# Patient Record
Sex: FEMALE | Race: WHITE | NOT HISPANIC OR LATINO | Employment: STUDENT | URBAN - METROPOLITAN AREA
[De-identification: names, ages, dates, MRNs, and addresses within clinical notes are randomized per-mention and may not be internally consistent; named-entity substitution may affect disease eponyms.]

---

## 2023-02-24 LAB
ALANINE AMINOTRANSFERASE (SGPT) (U/L) IN SER/PLAS: 15 U/L (ref 7–45)
ALBUMIN (G/DL) IN SER/PLAS: 4.5 G/DL (ref 3.4–5)
ALKALINE PHOSPHATASE (U/L) IN SER/PLAS: 58 U/L (ref 33–110)
ANION GAP IN SER/PLAS: 13 MMOL/L (ref 10–20)
ASPARTATE AMINOTRANSFERASE (SGOT) (U/L) IN SER/PLAS: 17 U/L (ref 9–39)
BASOPHILS (10*3/UL) IN BLOOD BY AUTOMATED COUNT: 0.03 X10E9/L (ref 0–0.1)
BASOPHILS/100 LEUKOCYTES IN BLOOD BY AUTOMATED COUNT: 0.6 % (ref 0–2)
BILIRUBIN TOTAL (MG/DL) IN SER/PLAS: 0.6 MG/DL (ref 0–1.2)
CALCIUM (MG/DL) IN SER/PLAS: 9.4 MG/DL (ref 8.6–10.3)
CARBON DIOXIDE, TOTAL (MMOL/L) IN SER/PLAS: 26 MMOL/L (ref 21–32)
CHLORIDE (MMOL/L) IN SER/PLAS: 102 MMOL/L (ref 98–107)
CHOLESTEROL (MG/DL) IN SER/PLAS: 243 MG/DL (ref 0–199)
CHOLESTEROL IN HDL (MG/DL) IN SER/PLAS: 46.4 MG/DL
CHOLESTEROL/HDL RATIO: 5.2
CREATININE (MG/DL) IN SER/PLAS: 0.89 MG/DL (ref 0.5–1.05)
EOSINOPHILS (10*3/UL) IN BLOOD BY AUTOMATED COUNT: 0.04 X10E9/L (ref 0–0.7)
EOSINOPHILS/100 LEUKOCYTES IN BLOOD BY AUTOMATED COUNT: 0.7 % (ref 0–6)
ERYTHROCYTE DISTRIBUTION WIDTH (RATIO) BY AUTOMATED COUNT: 13 % (ref 11.5–14.5)
ERYTHROCYTE MEAN CORPUSCULAR HEMOGLOBIN CONCENTRATION (G/DL) BY AUTOMATED: 33.2 G/DL (ref 32–36)
ERYTHROCYTE MEAN CORPUSCULAR VOLUME (FL) BY AUTOMATED COUNT: 92 FL (ref 80–100)
ERYTHROCYTES (10*6/UL) IN BLOOD BY AUTOMATED COUNT: 4.15 X10E12/L (ref 4–5.2)
GFR FEMALE: >90 ML/MIN/1.73M2
GLUCOSE (MG/DL) IN SER/PLAS: 83 MG/DL (ref 74–99)
HEMATOCRIT (%) IN BLOOD BY AUTOMATED COUNT: 38 % (ref 36–46)
HEMOGLOBIN (G/DL) IN BLOOD: 12.6 G/DL (ref 12–16)
IMMATURE GRANULOCYTES/100 LEUKOCYTES IN BLOOD BY AUTOMATED COUNT: 0.2 % (ref 0–0.9)
LDL: 187 MG/DL (ref 0–109)
LEUKOCYTES (10*3/UL) IN BLOOD BY AUTOMATED COUNT: 5.4 X10E9/L (ref 4.4–11.3)
LYMPHOCYTES (10*3/UL) IN BLOOD BY AUTOMATED COUNT: 1.87 X10E9/L (ref 1.2–4.8)
LYMPHOCYTES/100 LEUKOCYTES IN BLOOD BY AUTOMATED COUNT: 34.9 % (ref 13–44)
MONOCYTES (10*3/UL) IN BLOOD BY AUTOMATED COUNT: 0.39 X10E9/L (ref 0.1–1)
MONOCYTES/100 LEUKOCYTES IN BLOOD BY AUTOMATED COUNT: 7.3 % (ref 2–10)
NEUTROPHILS (10*3/UL) IN BLOOD BY AUTOMATED COUNT: 3.02 X10E9/L (ref 1.2–7.7)
NEUTROPHILS/100 LEUKOCYTES IN BLOOD BY AUTOMATED COUNT: 56.3 % (ref 40–80)
NON HDL CHOLESTEROL: 197 MG/DL (ref 0–119)
PLATELETS (10*3/UL) IN BLOOD AUTOMATED COUNT: 212 X10E9/L (ref 150–450)
POTASSIUM (MMOL/L) IN SER/PLAS: 4.2 MMOL/L (ref 3.5–5.3)
PROTEIN TOTAL: 7 G/DL (ref 6.4–8.2)
SODIUM (MMOL/L) IN SER/PLAS: 137 MMOL/L (ref 136–145)
THYROTROPIN (MIU/L) IN SER/PLAS BY DETECTION LIMIT <= 0.05 MIU/L: 0.97 MIU/L (ref 0.44–3.98)
TRIGLYCERIDE (MG/DL) IN SER/PLAS: 46 MG/DL (ref 0–149)
UREA NITROGEN (MG/DL) IN SER/PLAS: 20 MG/DL (ref 6–23)
VLDL: 9 MG/DL (ref 0–40)

## 2023-03-03 LAB
CLUE CELLS: ABNORMAL
NUGENT SCORE: 4
VAGINITIS-BV + YEAST INTERPRETATION: ABNORMAL
YEAST: ABNORMAL

## 2023-03-09 LAB
BACTERIA, URINE: ABNORMAL /HPF
MUCUS, URINE: ABNORMAL /LPF
RBC, URINE: <1 /HPF (ref 0–5)
SQUAMOUS EPITHELIAL CELLS, URINE: 4 /HPF
WBC, URINE: 3 /HPF (ref 0–5)

## 2023-03-10 LAB
CLUE CELLS: ABNORMAL
NUGENT SCORE: 4
VAGINITIS-BV + YEAST INTERPRETATION: ABNORMAL
YEAST: ABNORMAL

## 2023-03-13 LAB
FUNGAL SCREEN, YEAST: ABNORMAL
URINE CULTURE: ABNORMAL

## 2023-05-15 LAB — URINE CULTURE: ABNORMAL

## 2023-05-26 LAB
GENETICS TEST NAME-DATA CONVERSION: NORMAL
LAB MOLECULAR CA TECHNICAL NOTES: NORMAL

## 2023-05-31 LAB
ESTIMATED AVERAGE GLUCOSE FOR HBA1C: 105 MG/DL
HEMOGLOBIN A1C/HEMOGLOBIN TOTAL IN BLOOD: 5.3 %

## 2023-06-01 LAB
CLUE CELLS: ABNORMAL
NUGENT SCORE: 4
VAGINITIS-BV + YEAST INTERPRETATION: ABNORMAL
YEAST: ABNORMAL

## 2023-09-15 ENCOUNTER — HOSPITAL ENCOUNTER (OUTPATIENT)
Dept: DATA CONVERSION | Facility: HOSPITAL | Age: 23
End: 2023-09-15
Attending: OBSTETRICS & GYNECOLOGY | Admitting: OBSTETRICS & GYNECOLOGY
Payer: MEDICAID

## 2023-09-15 DIAGNOSIS — N94.2 VAGINISMUS: ICD-10-CM

## 2023-09-15 DIAGNOSIS — R10.2 PELVIC AND PERINEAL PAIN: ICD-10-CM

## 2023-09-15 DIAGNOSIS — N30.10 INTERSTITIAL CYSTITIS (CHRONIC) WITHOUT HEMATURIA: ICD-10-CM

## 2023-09-19 PROBLEM — N94.2 VAGINISMUS: Status: ACTIVE | Noted: 2023-09-19

## 2023-09-19 PROBLEM — D72.819 LEUKOPENIA: Status: ACTIVE | Noted: 2023-09-19

## 2023-09-19 PROBLEM — R47.89 SPEECH DYSFLUENCY: Status: ACTIVE | Noted: 2023-09-19

## 2023-09-19 PROBLEM — N39.8 VOIDING DYSFUNCTION: Status: ACTIVE | Noted: 2023-09-19

## 2023-09-19 PROBLEM — F81.0 READING DIFFICULTY: Status: ACTIVE | Noted: 2023-09-19

## 2023-09-19 PROBLEM — F54 PSYCHOLOGICAL FACTOR AFFECTING PHYSICAL CONDITION: Status: ACTIVE | Noted: 2023-09-19

## 2023-09-19 PROBLEM — N30.10 INTERSTITIAL CYSTITIS: Status: ACTIVE | Noted: 2023-09-19

## 2023-09-19 PROBLEM — N32.89 BLADDER SPASM: Status: ACTIVE | Noted: 2023-09-19

## 2023-09-19 PROBLEM — M75.50 SCAPULOTHORACIC BURSITIS: Status: ACTIVE | Noted: 2023-09-19

## 2023-09-19 PROBLEM — K21.9 CHRONIC GERD: Status: ACTIVE | Noted: 2023-09-19

## 2023-09-19 PROBLEM — N92.6 IRREGULAR MENSES: Status: ACTIVE | Noted: 2023-09-19

## 2023-09-19 PROBLEM — R31.9 HEMATURIA: Status: ACTIVE | Noted: 2023-09-19

## 2023-09-19 PROBLEM — E78.01 HETEROZYGOUS FAMILIAL HYPERCHOLESTEROLEMIA: Status: ACTIVE | Noted: 2023-09-19

## 2023-09-19 PROBLEM — N94.6 DYSMENORRHEA: Status: ACTIVE | Noted: 2023-09-19

## 2023-09-19 PROBLEM — E78.2 MIXED HYPERLIPIDEMIA: Status: ACTIVE | Noted: 2023-09-19

## 2023-09-19 PROBLEM — S43.431S LABRAL TEAR OF SHOULDER, RIGHT, SEQUELA: Status: ACTIVE | Noted: 2023-09-19

## 2023-09-19 PROBLEM — F43.10 PTSD (POST-TRAUMATIC STRESS DISORDER): Status: ACTIVE | Noted: 2023-09-19

## 2023-09-19 PROBLEM — E55.9 VITAMIN D DEFICIENCY: Status: ACTIVE | Noted: 2023-09-19

## 2023-09-19 PROBLEM — F90.0 ADHD, PREDOMINANTLY INATTENTIVE TYPE: Status: ACTIVE | Noted: 2023-09-19

## 2023-09-19 PROBLEM — R04.0 RECURRENT EPISTAXIS: Status: ACTIVE | Noted: 2023-09-19

## 2023-09-19 PROBLEM — M94.0 COSTOCHONDRITIS: Status: ACTIVE | Noted: 2023-09-19

## 2023-09-19 PROBLEM — M62.89 HIGH-TONE PELVIC FLOOR DYSFUNCTION IN FEMALE: Status: ACTIVE | Noted: 2023-09-19
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
## 2023-09-19 PROBLEM — M75.20 BICEPS TENDINITIS OF SHOULDER: Status: ACTIVE | Noted: 2023-09-19

## 2023-09-19 PROBLEM — M26.629 TMJ SYNDROME: Status: ACTIVE | Noted: 2023-09-19

## 2023-09-19 PROBLEM — N92.0 MENORRHAGIA WITH REGULAR CYCLE: Status: ACTIVE | Noted: 2023-09-19

## 2023-09-19 PROBLEM — R07.89 ATYPICAL CHEST PAIN: Status: ACTIVE | Noted: 2023-09-19

## 2023-09-19 PROBLEM — R35.0 FREQUENCY OF URINATION: Status: ACTIVE | Noted: 2023-09-19

## 2023-09-19 PROBLEM — F41.9 ANXIETY: Status: ACTIVE | Noted: 2023-09-19

## 2023-09-19 PROBLEM — R32 URINARY INCONTINENCE: Status: ACTIVE | Noted: 2023-09-19

## 2023-09-19 PROBLEM — R03.0 ELEVATED BP WITHOUT DIAGNOSIS OF HYPERTENSION: Status: ACTIVE | Noted: 2023-09-19

## 2023-09-19 RX ORDER — IBUPROFEN 600 MG/1
1 TABLET ORAL EVERY 8 HOURS PRN
COMMUNITY
Start: 2022-06-03

## 2023-09-19 RX ORDER — HYDROXYZINE HYDROCHLORIDE 25 MG/1
25 TABLET, FILM COATED ORAL NIGHTLY
COMMUNITY
End: 2023-11-27 | Stop reason: SDUPTHER

## 2023-09-19 RX ORDER — IBUPROFEN 800 MG/1
800 TABLET ORAL EVERY 6 HOURS PRN
COMMUNITY
Start: 2023-03-31 | End: 2023-11-30 | Stop reason: WASHOUT

## 2023-09-19 RX ORDER — NITROFURANTOIN 25; 75 MG/1; MG/1
1 CAPSULE ORAL EVERY 12 HOURS
COMMUNITY
Start: 2022-09-09 | End: 2023-11-30 | Stop reason: ALTCHOICE

## 2023-09-19 RX ORDER — NORETHINDRONE ACETATE AND ETHINYL ESTRADIOL, AND FERROUS FUMARATE 1.5-30(21)
1 KIT ORAL DAILY
COMMUNITY
Start: 2022-02-23 | End: 2023-11-30 | Stop reason: ALTCHOICE

## 2023-09-19 RX ORDER — NORETHINDRONE 0.35 MG/1
1 TABLET ORAL DAILY
COMMUNITY
End: 2023-11-30 | Stop reason: SINTOL

## 2023-09-19 RX ORDER — OMEPRAZOLE 20 MG/1
20 CAPSULE, DELAYED RELEASE ORAL DAILY
COMMUNITY
End: 2023-11-30 | Stop reason: ALTCHOICE

## 2023-09-19 RX ORDER — SOLIFENACIN SUCCINATE 5 MG/1
5 TABLET, FILM COATED ORAL DAILY
COMMUNITY
End: 2023-11-30

## 2023-09-19 RX ORDER — METHENAMINE, SODIUM PHOSPHATE, MONOBASIC, MONOHYDRATE, PHENYL SALICYLATE, METHYLENE BLUE, AND HYOSCYAMINE SULFATE 118; 40.8; 36; 10; .12 MG/1; MG/1; MG/1; MG/1; MG/1
1 CAPSULE ORAL 3 TIMES DAILY
COMMUNITY
Start: 2022-12-01 | End: 2024-01-05 | Stop reason: SDUPTHER

## 2023-09-19 RX ORDER — NORETHINDRONE ACETATE AND ETHINYL ESTRADIOL .03; 1.5 MG/1; MG/1
1 TABLET ORAL DAILY
COMMUNITY
End: 2023-11-30 | Stop reason: ALTCHOICE

## 2023-09-19 RX ORDER — AMITRIPTYLINE HYDROCHLORIDE 25 MG/1
25 TABLET, FILM COATED ORAL NIGHTLY
COMMUNITY
End: 2024-01-05 | Stop reason: SDUPTHER

## 2023-09-19 RX ORDER — CYCLOBENZAPRINE HCL 5 MG
1-2 TABLET ORAL
COMMUNITY
Start: 2022-06-03 | End: 2024-01-02 | Stop reason: SDUPTHER

## 2023-09-19 RX ORDER — PHENAZOPYRIDINE HYDROCHLORIDE 200 MG/1
200 TABLET, FILM COATED ORAL 3 TIMES DAILY
COMMUNITY
Start: 2023-03-15 | End: 2023-11-30 | Stop reason: ALTCHOICE

## 2023-09-19 RX ORDER — DIAZEPAM 5 MG/1
5 TABLET ORAL NIGHTLY PRN
COMMUNITY

## 2023-09-19 RX ORDER — ACETAMINOPHEN AND CODEINE PHOSPHATE 300; 30 MG/1; MG/1
1 TABLET ORAL 4 TIMES DAILY PRN
COMMUNITY
Start: 2023-03-31 | End: 2023-11-30 | Stop reason: ALTCHOICE

## 2023-09-19 RX ORDER — TAMSULOSIN HYDROCHLORIDE 0.4 MG/1
0.4 CAPSULE ORAL NIGHTLY
COMMUNITY
End: 2023-12-22

## 2023-09-19 RX ORDER — OMEPRAZOLE 40 MG/1
40 CAPSULE, DELAYED RELEASE ORAL
COMMUNITY
Start: 2020-09-01 | End: 2024-01-02 | Stop reason: SDUPTHER

## 2023-09-19 RX ORDER — CHLORHEXIDINE GLUCONATE ORAL RINSE 1.2 MG/ML
15 SOLUTION DENTAL DAILY
COMMUNITY
Start: 2023-03-31 | End: 2023-11-30 | Stop reason: ALTCHOICE

## 2023-09-19 RX ORDER — LORATADINE 10 MG/1
1 TABLET ORAL DAILY PRN
COMMUNITY
Start: 2021-08-09

## 2023-09-19 RX ORDER — ATORVASTATIN CALCIUM 80 MG/1
1 TABLET, FILM COATED ORAL DAILY
COMMUNITY
Start: 2023-06-16 | End: 2023-11-30 | Stop reason: DRUGHIGH

## 2023-09-19 RX ORDER — AMITRIPTYLINE HYDROCHLORIDE 10 MG/1
10-30 TABLET, FILM COATED ORAL NIGHTLY
COMMUNITY
End: 2023-11-30 | Stop reason: ALTCHOICE

## 2023-09-19 RX ORDER — VIBEGRON 75 MG/1
1 TABLET, FILM COATED ORAL DAILY
COMMUNITY
Start: 2023-02-02 | End: 2024-04-30 | Stop reason: SDUPTHER

## 2023-09-19 RX ORDER — IBREXAFUNGERP 150 MG/1
2 TABLET, FILM COATED ORAL EVERY 12 HOURS
COMMUNITY
Start: 2023-04-12 | End: 2023-11-30 | Stop reason: ALTCHOICE

## 2023-09-23 LAB
CHOLESTEROL (MG/DL) IN SER/PLAS: 116 MG/DL (ref 0–199)
CHOLESTEROL IN HDL (MG/DL) IN SER/PLAS: 51.4 MG/DL
CHOLESTEROL/HDL RATIO: 2.3
LDL: 56 MG/DL (ref 0–119)
NON HDL CHOLESTEROL: 65 MG/DL (ref 0–149)
TRIGLYCERIDE (MG/DL) IN SER/PLAS: 42 MG/DL (ref 0–149)
VLDL: 8 MG/DL (ref 0–40)

## 2023-09-24 LAB — URINE CULTURE: NORMAL

## 2023-09-29 VITALS — BODY MASS INDEX: 21.63 KG/M2 | HEIGHT: 67 IN | WEIGHT: 137.79 LBS | RESPIRATION RATE: 16 BRPM

## 2023-09-30 NOTE — H&P
"    History of Present Illness:   Pregnant/Lactating:  ·  Are You Pregnant no (1)   ·  Are You Currently Breastfeeding no (1)     History Present Illness:  Reason for surgery: Pelvic pain   HPI:    24 yo with chronic pelvic pain and interstitial cystitis. Pain has responded well to injections in the past and Hunner lesions have been visualized in the past. Therefore  plan to repeat injections and look for Hunner lesions to fulgarate.    Allergies:        Allergies:  ·  No Known Allergies :     Home Medication Review:   Home Medications Reviewed: yes     Impression/Procedure:   ·  Impression and Planned Procedure: Cystoscopy and trigger point injections       ERAS (Enhanced Recovery After Surgery):  ·  ERAS Patient: no       Vital Signs:  Respiratory Rate: 16 breath per minute     Physical Exam by System:    Constitutional: Well developed, awake/alert/oriented  x3, no distress, alert and cooperative   Eyes: PERRL, EOMI, clear sclera   Head/Neck: Neck supple, no apparent injury, thyroid  without mass or tenderness, No JVD, trachea midline, no bruits   Respiratory/Thorax: Patent airways, CTAB, normal  breath sounds with good chest expansion, thorax symmetric   Cardiovascular: Regular, rate and rhythm, no murmurs,  2+ equal pulses of the extremities, normal S 1and S 2   Genitourinary: defer to the OR   Psychological: Appropriate mood and behavior   Skin: Warm and dry, no lesions, no rashes     Consent:   COVID-19 Consent:  ·  COVID-19 Risk Consent Surgeon has reviewed key risks related to the risk of nehemiah COVID-19 and if they contract COVID-19 what the risks are.       Electronic Signatures:  Karen Hernandez)  (Signed 15-Sep-2023 07:22)   Authored: History of Present Illness, Allergies, Home  Medication Review, Impression/Procedure, ERAS, Physical Exam, Consent, Note Completion      Last Updated: 15-Sep-2023 07:22 by Karen Hernandez)    References:  1.  Data Referenced From \"Patient Profile - Preop " "v3\" 15-Sep-2023 07:09   "

## 2023-10-01 NOTE — OP NOTE
Post Operative Note:     PreOp Diagnosis: Pelvic pain, interstitial cystitis   Post-Procedure Diagnosis: Same   Procedure: 1. Pelvic floor trigger point injections  2.  Pudendal nerve block  3. Cystoscopy  4. Bladder fulgaration   Surgeon:    Resident/Fellow/Other Assistant: none   Anesthesia: MAC   Estimated Blood Loss (mL): 5   Specimen: no   Complications: none   Findings: bladder with multiple hunner's lesions  and petechiae     Operative Report Dictated:  Dictation: not applicable - note contains Operative  Report   Operative Report:    PROCEDURE IN DETAIL:  The patient was interviewed in the preop holding area by the surgical team, where the risks, benefits, and indication of the procedure were reviewed.  She was then transferred to the operating suite where the patient was properly identified and the procedure  was confirmed. When adequate anesthesia was obtained, the patient was prepped and draped in a dorsal lithotomy position. A time-out was performed. No antibiotics were given.    20 mL of 1% plain lidocaine and 1 mL of 40mg/cc kenalog was drawn up and 5 mL was injected into each pudendal nerve.  Using a pudendal injection tray, the remaining solution solution was then injected into the medial and lateral aspects of the bilateral levator ani, iliococcygeus, coccygeus, piriformis, and obturator internus muscles. Approximately 1-2 mL  was placed  into each injection site. Pressure was applied to the bilateral muscle beds with adequate hemostasis achieved.     Attention was then turned to the bladder where cystoscopy was done. As the bladder was filling, many areas of petechiae became more prominent. two or three hunner's lesions were noted and the bladder began to slowly bleed. HR increased as well.  Hydrodistention was maintained for five minutes. Bladder was emptied and found to be 700 ml.  The hunner's lesions were then fulgarated and some of the bleeding area also fulgarated. Bladder was  irrigated and emptied. Urojet was inserted in the urethra and vagina for extra comfort.    The patient was then awakened from anesthesia, having tolerated procedure well, and was taken to the recovery room in stable condition. All sponge, needle, and instrument counts were correct at the end the case.      Attestation:   Note Completion:  Attending Attestation I was present for the entire procedure         Electronic Signatures:  Karen Hernandez)  (Signed 15-Sep-2023 10:04)   Authored: Post Operative Note, Note Completion      Last Updated: 15-Sep-2023 10:04 by Karen Hernandez)

## 2023-10-19 ENCOUNTER — OFFICE VISIT (OUTPATIENT)
Dept: UROLOGY | Facility: CLINIC | Age: 23
End: 2023-10-19
Payer: MEDICAID

## 2023-10-19 ENCOUNTER — PATIENT MESSAGE (OUTPATIENT)
Dept: OBSTETRICS AND GYNECOLOGY | Facility: CLINIC | Age: 23
End: 2023-10-19

## 2023-10-19 VITALS
HEART RATE: 96 BPM | TEMPERATURE: 98.4 F | DIASTOLIC BLOOD PRESSURE: 84 MMHG | HEIGHT: 67 IN | WEIGHT: 134 LBS | BODY MASS INDEX: 21.03 KG/M2 | SYSTOLIC BLOOD PRESSURE: 136 MMHG

## 2023-10-19 DIAGNOSIS — N30.10 INTERSTITIAL CYSTITIS: ICD-10-CM

## 2023-10-19 DIAGNOSIS — Z30.41 ENCOUNTER FOR SURVEILLANCE OF CONTRACEPTIVE PILLS: Primary | ICD-10-CM

## 2023-10-19 DIAGNOSIS — R10.2 PELVIC PAIN: Primary | ICD-10-CM

## 2023-10-19 PROCEDURE — 1036F TOBACCO NON-USER: CPT | Performed by: OBSTETRICS & GYNECOLOGY

## 2023-10-19 PROCEDURE — 99024 POSTOP FOLLOW-UP VISIT: CPT | Performed by: OBSTETRICS & GYNECOLOGY

## 2023-10-19 ASSESSMENT — PAIN SCALES - GENERAL: PAINLEVEL: 6

## 2023-10-19 NOTE — PROGRESS NOTES
"Subjective   Patient ID: Tracey Villa is a 23 y.o. female who presents for Follow-up.  Patient reports experiencing flare up of pelvic pain. She notes that she has not been able to attend physical therapy since Ela left. She states that school is going well, and has no new complaints.      Only that trigger points did not help as much as last time.     Review of Systems   All other systems reviewed and are negative.      Objective   Physical Exam  Vitals reviewed.     /84   Pulse 96   Temp 36.9 °C (98.4 °F)   Ht 1.702 m (5' 7\")   Wt 60.8 kg (134 lb)   BMI 20.99 kg/m²       Assessment/Plan     Pelvic pain: Decrease amytriptylline dose. Make an appointment with Ela and follow-up with Yelitza. Follow-up as needed.         Scribe Attestation  By signing my name below, IMedina, Scribe   attest that this documentation has been prepared under the direction and in the presence of Karen Hernandez MD MPH.    "

## 2023-10-19 NOTE — LETTER
October 19, 2023     Patient: Tracey Villa   YOB: 2000   Date of Visit: 10/19/2023       To Whom It May Concern:    Tracey Villa was seen in my clinic on 10/19/2023 at 3:00 pm. Please excuse Tracey for her absence from work on this day to make the appointment.    If you have any questions or concerns, please don't hesitate to call.         Sincerely,         Karen Hernandez MD MPH        CC: No Recipients

## 2023-10-20 RX ORDER — DROSPIRENONE 4 MG/1
4 TABLET, FILM COATED ORAL DAILY
Qty: 90 TABLET | Refills: 2 | Status: SHIPPED | OUTPATIENT
Start: 2023-10-20 | End: 2024-01-16 | Stop reason: SDUPTHER

## 2023-11-27 DIAGNOSIS — N30.10 INTERSTITIAL CYSTITIS: ICD-10-CM

## 2023-11-27 DIAGNOSIS — N30.10 INTERSTITIAL CYSTITIS: Primary | ICD-10-CM

## 2023-11-28 RX ORDER — HYDROXYZINE HYDROCHLORIDE 25 MG/1
25 TABLET, FILM COATED ORAL NIGHTLY
Qty: 30 TABLET | Refills: 0 | Status: SHIPPED | OUTPATIENT
Start: 2023-11-28 | End: 2024-01-16 | Stop reason: SDUPTHER

## 2023-11-28 RX ORDER — HYDROXYZINE HYDROCHLORIDE 25 MG/1
25 TABLET, FILM COATED ORAL NIGHTLY
Qty: 60 TABLET | Refills: 4 | Status: SHIPPED | OUTPATIENT
Start: 2023-11-28 | End: 2023-11-30 | Stop reason: ALTCHOICE

## 2023-11-30 ENCOUNTER — OFFICE VISIT (OUTPATIENT)
Dept: PRIMARY CARE | Facility: CLINIC | Age: 23
End: 2023-11-30
Payer: MEDICAID

## 2023-11-30 VITALS
DIASTOLIC BLOOD PRESSURE: 72 MMHG | BODY MASS INDEX: 20.99 KG/M2 | WEIGHT: 134 LBS | HEART RATE: 121 BPM | SYSTOLIC BLOOD PRESSURE: 110 MMHG

## 2023-11-30 DIAGNOSIS — Z91.018 FOOD ALLERGY: Primary | ICD-10-CM

## 2023-11-30 PROBLEM — D72.819 LEUKOPENIA: Status: RESOLVED | Noted: 2023-09-19 | Resolved: 2023-11-30

## 2023-11-30 PROBLEM — R04.0 RECURRENT EPISTAXIS: Status: RESOLVED | Noted: 2023-09-19 | Resolved: 2023-11-30

## 2023-11-30 PROBLEM — R03.0 ELEVATED BP WITHOUT DIAGNOSIS OF HYPERTENSION: Status: RESOLVED | Noted: 2023-09-19 | Resolved: 2023-11-30

## 2023-11-30 PROBLEM — R31.9 HEMATURIA: Status: RESOLVED | Noted: 2023-09-19 | Resolved: 2023-11-30
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
## 2023-11-30 PROCEDURE — 1036F TOBACCO NON-USER: CPT | Performed by: FAMILY MEDICINE

## 2023-11-30 PROCEDURE — 99213 OFFICE O/P EST LOW 20 MIN: CPT | Performed by: FAMILY MEDICINE

## 2023-11-30 RX ORDER — EPINEPHRINE 0.3 MG/.3ML
1 INJECTION SUBCUTANEOUS AS NEEDED
Qty: 2 EACH | Refills: 2 | Status: SHIPPED | OUTPATIENT
Start: 2023-11-30 | End: 2024-01-10

## 2023-11-30 RX ORDER — ATORVASTATIN CALCIUM 80 MG/1
40 TABLET, FILM COATED ORAL DAILY
Qty: 45 TABLET | Refills: 1 | Status: SHIPPED
Start: 2023-11-30

## 2023-11-30 NOTE — PATIENT INSTRUCTIONS
Since tomatoes, milk and chocolate are causing you issues -   Avoid ingesting them.       I have referred you to an allergist to see if they can help you.     I have prescribed Epi-pens to use if you ever feel like you have eaten something and it starts to effect your throat or your breathing.     If you need to use it call 911.     It only gives you 20 minutes of relief before symptoms come back again.    If you  need to - you can use the second dose of epi before 911 gets there and any trouble with your throat or breathing persists.

## 2023-11-30 NOTE — PROGRESS NOTES
Subjective   Patient ID: Tracey Villa is a 23 y.o. female who presents for here for a possible food allergy. (When she eats tomatoes her face around the mouth swells, itchy and vomiting.).    HPI       Has discovered she is allergic to tomatoes -   Any form of them cause her lips to swell and get blisters around her mouth - within 30 min  -   Chest hurts, GERD gets worse too -   If she avoids them - she does not have these sx -     Its very hard to stay away from tomatoes  - interested in treatment for this       Milk and chocolate causes sores in her mouth too           Review of Systems    Objective   /72 (BP Location: Right arm, Patient Position: Sitting, BP Cuff Size: Adult)   Pulse (!) 121   Wt 60.8 kg (134 lb)   BMI 20.99 kg/m²     Physical Exam  Vitals reviewed.   Constitutional:       General: She is not in acute distress.     Appearance: Normal appearance.   HENT:      Head: Normocephalic and atraumatic.      Nose: Nose normal.      Mouth/Throat:      Mouth: Mucous membranes are moist.      Pharynx: No posterior oropharyngeal erythema.   Eyes:      Extraocular Movements: Extraocular movements intact.      Conjunctiva/sclera: Conjunctivae normal.      Pupils: Pupils are equal, round, and reactive to light.   Cardiovascular:      Rate and Rhythm: Normal rate and regular rhythm.      Heart sounds: Normal heart sounds. No murmur heard.  Pulmonary:      Effort: Pulmonary effort is normal. No respiratory distress.      Breath sounds: Normal breath sounds. No wheezing.   Musculoskeletal:      Cervical back: No rigidity.   Lymphadenopathy:      Cervical: No cervical adenopathy.   Skin:     General: Skin is warm and dry.      Findings: No rash.   Neurological:      General: No focal deficit present.      Mental Status: She is alert. Mental status is at baseline.   Psychiatric:         Mood and Affect: Mood normal.         Thought Content: Thought content normal.         Assessment/Plan   Problem  List Items Addressed This Visit    None  Visit Diagnoses         Codes    Food allergy    -  Primary Z91.018    Relevant Medications    EPINEPHrine (Epipen) 0.3 mg/0.3 mL injection syringe    Other Relevant Orders    Referral to Allergy          She is interested in if there is immunotherapy -   I told her I was not sure - refer to allergist     We discussed at visit any disease processes that were of concern as well as the risks, benefits and instructions of any new medication provided.    See orders and discussion section for information handed to patient on their Clinical Summary.   Patient (and/or caretaker of patient if present)  stated all questions were answered, and they voiced understanding of instructions.

## 2023-12-22 DIAGNOSIS — N32.89 BLADDER SPASM: ICD-10-CM

## 2023-12-22 DIAGNOSIS — N30.10 INTERSTITIAL CYSTITIS: ICD-10-CM

## 2023-12-22 RX ORDER — TAMSULOSIN HYDROCHLORIDE 0.4 MG/1
0.4 CAPSULE ORAL
Qty: 90 CAPSULE | Refills: 0 | Status: SHIPPED | OUTPATIENT
Start: 2023-12-22 | End: 2024-03-19

## 2024-01-02 ENCOUNTER — TELEPHONE (OUTPATIENT)
Dept: PRIMARY CARE | Facility: CLINIC | Age: 24
End: 2024-01-02
Payer: MEDICAID

## 2024-01-02 DIAGNOSIS — K21.9 CHRONIC GERD: ICD-10-CM

## 2024-01-02 DIAGNOSIS — M94.0 COSTOCHONDRITIS: Primary | ICD-10-CM

## 2024-01-02 DIAGNOSIS — M94.0 COSTOCHONDRITIS: ICD-10-CM

## 2024-01-02 RX ORDER — CYCLOBENZAPRINE HCL 5 MG
5-10 TABLET ORAL EVERY 8 HOURS PRN
Qty: 90 TABLET | Refills: 2 | Status: SHIPPED | OUTPATIENT
Start: 2024-01-02

## 2024-01-02 RX ORDER — CYCLOBENZAPRINE HCL 5 MG
5-10 TABLET ORAL 3 TIMES DAILY PRN
Qty: 90 TABLET | Refills: 2 | Status: SHIPPED | OUTPATIENT
Start: 2024-01-02 | End: 2024-01-02 | Stop reason: SDUPTHER

## 2024-01-02 RX ORDER — OMEPRAZOLE 40 MG/1
40 CAPSULE, DELAYED RELEASE ORAL
Qty: 90 CAPSULE | Refills: 1 | Status: SHIPPED | OUTPATIENT
Start: 2024-01-02

## 2024-01-02 NOTE — TELEPHONE ENCOUNTER
Maranda from [facility] pharmacy called to get clarification of the cyclobenzaprine rx sent in today.

## 2024-01-05 DIAGNOSIS — N39.0 RECURRENT UTI: ICD-10-CM

## 2024-01-05 DIAGNOSIS — R10.2 PELVIC PAIN: ICD-10-CM

## 2024-01-05 DIAGNOSIS — R30.0 DYSURIA: Primary | ICD-10-CM

## 2024-01-05 RX ORDER — METHENAMINE, SODIUM PHOSPHATE, MONOBASIC, MONOHYDRATE, PHENYL SALICYLATE, METHYLENE BLUE, AND HYOSCYAMINE SULFATE 118; 40.8; 36; 10; .12 MG/1; MG/1; MG/1; MG/1; MG/1
1 CAPSULE ORAL 3 TIMES DAILY
Qty: 30 CAPSULE | Refills: 3 | Status: SHIPPED | OUTPATIENT
Start: 2024-01-05 | End: 2024-01-10 | Stop reason: SDUPTHER

## 2024-01-05 RX ORDER — AMITRIPTYLINE HYDROCHLORIDE 25 MG/1
25 TABLET, FILM COATED ORAL NIGHTLY
Qty: 90 TABLET | Refills: 3 | Status: SHIPPED | OUTPATIENT
Start: 2024-01-05 | End: 2025-01-04

## 2024-01-09 ENCOUNTER — TELEPHONE (OUTPATIENT)
Dept: UROLOGY | Facility: CLINIC | Age: 24
End: 2024-01-09
Payer: MEDICAID

## 2024-01-09 NOTE — TELEPHONE ENCOUNTER
Maranda, pharmacist at [facility] in [place] left message stating that the Uribel that was sent in for the patient is not currently available as it is on back order and is asking if there is something else you want to send in instead. Please advise, thank you.

## 2024-01-10 ENCOUNTER — OFFICE VISIT (OUTPATIENT)
Dept: OBSTETRICS AND GYNECOLOGY | Facility: CLINIC | Age: 24
End: 2024-01-10
Payer: MEDICAID

## 2024-01-10 VITALS — DIASTOLIC BLOOD PRESSURE: 80 MMHG | BODY MASS INDEX: 20.2 KG/M2 | SYSTOLIC BLOOD PRESSURE: 122 MMHG | WEIGHT: 129 LBS

## 2024-01-10 DIAGNOSIS — R39.9 UTI SYMPTOMS: ICD-10-CM

## 2024-01-10 DIAGNOSIS — Z30.09 BIRTH CONTROL COUNSELING: Primary | ICD-10-CM

## 2024-01-10 DIAGNOSIS — R30.0 DYSURIA: ICD-10-CM

## 2024-01-10 DIAGNOSIS — Z11.3 SCREENING FOR STDS (SEXUALLY TRANSMITTED DISEASES): ICD-10-CM

## 2024-01-10 DIAGNOSIS — Z32.02 PREGNANCY TEST NEGATIVE: ICD-10-CM

## 2024-01-10 LAB — PREGNANCY TEST URINE, POC: NEGATIVE

## 2024-01-10 PROCEDURE — 87086 URINE CULTURE/COLONY COUNT: CPT

## 2024-01-10 PROCEDURE — 81025 URINE PREGNANCY TEST: CPT | Performed by: NURSE PRACTITIONER

## 2024-01-10 PROCEDURE — 87800 DETECT AGNT MULT DNA DIREC: CPT

## 2024-01-10 PROCEDURE — 1036F TOBACCO NON-USER: CPT | Performed by: NURSE PRACTITIONER

## 2024-01-10 PROCEDURE — 99213 OFFICE O/P EST LOW 20 MIN: CPT | Performed by: NURSE PRACTITIONER

## 2024-01-10 RX ORDER — NITROFURANTOIN 25; 75 MG/1; MG/1
100 CAPSULE ORAL 2 TIMES DAILY
COMMUNITY
Start: 2024-01-06 | End: 2024-01-11

## 2024-01-10 RX ORDER — ONDANSETRON 4 MG/1
4 TABLET, ORALLY DISINTEGRATING ORAL EVERY 6 HOURS PRN
COMMUNITY
Start: 2023-12-11

## 2024-01-10 RX ORDER — METHENAMINE, SODIUM PHOSPHATE, MONOBASIC, MONOHYDRATE, PHENYL SALICYLATE, METHYLENE BLUE, AND HYOSCYAMINE SULFATE 118; 40.8; 36; 10; .12 MG/1; MG/1; MG/1; MG/1; MG/1
1 CAPSULE ORAL DAILY PRN
Qty: 30 CAPSULE | Refills: 1 | Status: SHIPPED | OUTPATIENT
Start: 2024-01-10 | End: 2024-02-09

## 2024-01-10 ASSESSMENT — ENCOUNTER SYMPTOMS: FATIGUE: 1
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
## 2024-01-10 NOTE — TELEPHONE ENCOUNTER
[facility] does not have the substitute either. Spoke with the patient and she is asking if we can send the uribel to the  in [place]. I did add it as a pharmacy in her chart, thanks.

## 2024-01-11 LAB
BACTERIA UR CULT: NO GROWTH
C TRACH RRNA SPEC QL NAA+PROBE: NEGATIVE
N GONORRHOEA DNA SPEC QL PROBE+SIG AMP: NEGATIVE

## 2024-01-16 ENCOUNTER — PATIENT MESSAGE (OUTPATIENT)
Dept: OBSTETRICS AND GYNECOLOGY | Facility: CLINIC | Age: 24
End: 2024-01-16
Payer: MEDICAID

## 2024-01-16 DIAGNOSIS — N30.10 INTERSTITIAL CYSTITIS: ICD-10-CM

## 2024-01-16 DIAGNOSIS — Z30.41 ENCOUNTER FOR SURVEILLANCE OF CONTRACEPTIVE PILLS: ICD-10-CM

## 2024-01-16 RX ORDER — DROSPIRENONE 4 MG/1
4 TABLET, FILM COATED ORAL DAILY
Qty: 90 TABLET | Refills: 1 | Status: SHIPPED | OUTPATIENT
Start: 2024-01-16 | End: 2024-05-20 | Stop reason: SDUPTHER

## 2024-01-16 RX ORDER — HYDROXYZINE HYDROCHLORIDE 25 MG/1
25 TABLET, FILM COATED ORAL NIGHTLY
Qty: 30 TABLET | Refills: 8 | Status: SHIPPED | OUTPATIENT
Start: 2024-01-16

## 2024-02-19 ENCOUNTER — APPOINTMENT (OUTPATIENT)
Dept: ALLERGY | Facility: CLINIC | Age: 24
End: 2024-02-19
Payer: MEDICAID

## 2024-03-17 DIAGNOSIS — N32.89 BLADDER SPASM: ICD-10-CM

## 2024-03-17 DIAGNOSIS — N30.10 INTERSTITIAL CYSTITIS: ICD-10-CM

## 2024-03-19 RX ORDER — TAMSULOSIN HYDROCHLORIDE 0.4 MG/1
0.4 CAPSULE ORAL
Qty: 90 CAPSULE | Refills: 1 | Status: SHIPPED | OUTPATIENT
Start: 2024-03-19 | End: 2024-04-30 | Stop reason: SDUPTHER

## 2024-04-05 ENCOUNTER — OFFICE VISIT (OUTPATIENT)
Dept: PRIMARY CARE | Facility: CLINIC | Age: 24
End: 2024-04-05
Payer: MEDICAID

## 2024-04-05 VITALS
OXYGEN SATURATION: 98 % | HEART RATE: 104 BPM | SYSTOLIC BLOOD PRESSURE: 116 MMHG | WEIGHT: 137.3 LBS | DIASTOLIC BLOOD PRESSURE: 75 MMHG | BODY MASS INDEX: 21.5 KG/M2

## 2024-04-05 DIAGNOSIS — R00.2 PALPITATIONS: ICD-10-CM

## 2024-04-05 DIAGNOSIS — R42 DIZZINESS: ICD-10-CM

## 2024-04-05 DIAGNOSIS — R23.2 HOT FLASHES: Primary | ICD-10-CM

## 2024-04-05 DIAGNOSIS — R00.0 TACHYCARDIA: ICD-10-CM

## 2024-04-05 LAB
ALBUMIN SERPL BCP-MCNC: 4.6 G/DL (ref 3.4–5)
ALP SERPL-CCNC: 81 U/L (ref 33–110)
ALT SERPL W P-5'-P-CCNC: 17 U/L (ref 7–45)
ANION GAP SERPL CALC-SCNC: 15 MMOL/L (ref 10–20)
AST SERPL W P-5'-P-CCNC: 17 U/L (ref 9–39)
BASOPHILS # BLD AUTO: 0.03 X10*3/UL (ref 0–0.1)
BASOPHILS NFR BLD AUTO: 0.8 %
BILIRUB SERPL-MCNC: 0.6 MG/DL (ref 0–1.2)
BUN SERPL-MCNC: 19 MG/DL (ref 6–23)
CALCIUM SERPL-MCNC: 9.6 MG/DL (ref 8.6–10.3)
CHLORIDE SERPL-SCNC: 105 MMOL/L (ref 98–107)
CO2 SERPL-SCNC: 26 MMOL/L (ref 21–32)
CREAT SERPL-MCNC: 0.89 MG/DL (ref 0.5–1.05)
EGFRCR SERPLBLD CKD-EPI 2021: >90 ML/MIN/1.73M*2
EOSINOPHIL # BLD AUTO: 0.05 X10*3/UL (ref 0–0.7)
EOSINOPHIL NFR BLD AUTO: 1.3 %
ERYTHROCYTE [DISTWIDTH] IN BLOOD BY AUTOMATED COUNT: 12.7 % (ref 11.5–14.5)
GLUCOSE SERPL-MCNC: 78 MG/DL (ref 74–99)
HCT VFR BLD AUTO: 41.3 % (ref 36–46)
HGB BLD-MCNC: 13.4 G/DL (ref 12–16)
IMM GRANULOCYTES # BLD AUTO: 0 X10*3/UL (ref 0–0.7)
IMM GRANULOCYTES NFR BLD AUTO: 0 % (ref 0–0.9)
LYMPHOCYTES # BLD AUTO: 1.58 X10*3/UL (ref 1.2–4.8)
LYMPHOCYTES NFR BLD AUTO: 42.5 %
MCH RBC QN AUTO: 30.4 PG (ref 26–34)
MCHC RBC AUTO-ENTMCNC: 32.4 G/DL (ref 32–36)
MCV RBC AUTO: 94 FL (ref 80–100)
MONOCYTES # BLD AUTO: 0.31 X10*3/UL (ref 0.1–1)
MONOCYTES NFR BLD AUTO: 8.3 %
NEUTROPHILS # BLD AUTO: 1.75 X10*3/UL (ref 1.2–7.7)
NEUTROPHILS NFR BLD AUTO: 47.1 %
NRBC BLD-RTO: 0 /100 WBCS (ref 0–0)
PLATELET # BLD AUTO: 187 X10*3/UL (ref 150–450)
POTASSIUM SERPL-SCNC: 4.3 MMOL/L (ref 3.5–5.3)
PROT SERPL-MCNC: 7.1 G/DL (ref 6.4–8.2)
RBC # BLD AUTO: 4.41 X10*6/UL (ref 4–5.2)
SODIUM SERPL-SCNC: 142 MMOL/L (ref 136–145)
TSH SERPL-ACNC: 2.08 MIU/L (ref 0.44–3.98)
WBC # BLD AUTO: 3.7 X10*3/UL (ref 4.4–11.3)

## 2024-04-05 PROCEDURE — 1036F TOBACCO NON-USER: CPT

## 2024-04-05 PROCEDURE — 84443 ASSAY THYROID STIM HORMONE: CPT

## 2024-04-05 PROCEDURE — 36415 COLL VENOUS BLD VENIPUNCTURE: CPT

## 2024-04-05 PROCEDURE — 99213 OFFICE O/P EST LOW 20 MIN: CPT

## 2024-04-05 PROCEDURE — 80053 COMPREHEN METABOLIC PANEL: CPT

## 2024-04-05 PROCEDURE — 85025 COMPLETE CBC W/AUTO DIFF WBC: CPT

## 2024-04-05 NOTE — PATIENT INSTRUCTIONS
-TSH to check thyroid   -CMP to look at electrolytes, kidney function, liver function  -CBC w/ diff - to look at red blood cells and white blood cells - look for anemia   -Zio patch/holter monitor to look for palpitations/irregular beats  -Tilt table for the dizziness and high heart rate - evaluating for POTS [phone]

## 2024-04-05 NOTE — PROGRESS NOTES
"Subjective   Patient ID: Tracey Villa is a 24 y.o. female who presents for Dizziness (Numbness in feet and heart rate elevated).  HPI  Tracey states that she has been getting hot flashes/sweaty for the past 3 weeks  It is getting worse, she will get the hot flashes just sitting down or even outside in the cold  She also gets dizzy, most recently she was getting up from a chair and got lightheaded/dizzy and then fell back into the chair   She states she will feel dizzy changing positions but also feels dizzy sitting down not moving  She has passed out before, it was a few years ago   She states she feels her heart beating  She feels out of breath, like she has been running, which goes on for the whole day  No smoking or vaping     Chest pain has been an on-going issue  She has concern about a heart-related issue   She describes the sensation as a \"glass in her chest\" sensation and she has a tight pressure feeling in chest   CHF in her family  HR has been high - last couple months has been at 120s, she does feel palpations   Peliv PT said to keep track of it - pulse is high, 126, getting higher    She also has numbness in her feet, starts at feet and goes up into legs and sometimes into thighs  Happening every day even siting or walking  She says it feels like her feet fall asleep  L side was hurting when it got numb and tingling   Lasts the majority of the day will go away on its own     Past Surgical History:   Procedure Laterality Date    COLPOSCOPY  09/15/24    OTHER SURGICAL HISTORY  02/20/2020    No history of surgery      Past Medical History:   Diagnosis Date    Acute upper respiratory infection, unspecified 02/09/2018    Acute upper respiratory infection    Anemia     Contact with and (suspected) exposure to other viral communicable diseases 12/22/2014    Exposure to influenza    Depression 2021    Pediculosis due to pediculus humanus capitis 12/03/2015    Lice infested hair    Personal history of " diseases of the blood and blood-forming organs and certain disorders involving the immune mechanism 08/09/2019    History of iron deficiency anemia    Personal history of other diseases of the respiratory system 12/22/2014    History of pharyngitis    Personal history of other diseases of the respiratory system 02/09/2018    History of sore throat    Personal history of other specified conditions 12/22/2014    History of fever    Personal history of other specified conditions 11/27/2019    History of epistaxis    Urinary tract infection      Social History     Tobacco Use    Smoking status: Never    Smokeless tobacco: Never   Vaping Use    Vaping Use: Never used   Substance Use Topics    Alcohol use: Not Currently    Drug use: Never        Review of Systems  10 point review of systems performed and is negative except as noted in the HPI.      Current Outpatient Medications:     amitriptyline (Elavil) 25 mg tablet, Take 1 tablet (25 mg) by mouth once daily at bedtime., Disp: 90 tablet, Rfl: 3    atorvastatin (Lipitor) 80 mg tablet, Take 0.5 tablets (40 mg) by mouth once daily., Disp: 45 tablet, Rfl: 1    cyclobenzaprine (Flexeril) 5 mg tablet, Take 1-2 tablets (5-10 mg) by mouth every 8 hours if needed for muscle spasms., Disp: 90 tablet, Rfl: 2    diazePAM (Valium) 5 mg tablet, Take 1 tablet (5 mg) by mouth as needed at bedtime. Insert one pill vaginally as needed for pain, Disp: , Rfl:     drospirenone, contraceptive, (Slynd) 4 mg (28) tablet, Take 1 tablet by mouth once daily., Disp: 90 tablet, Rfl: 1    Gemtesa 75 mg tablet, Take 1 tablet (75 mg) by mouth once daily., Disp: , Rfl:     hydrOXYzine HCL (Atarax) 25 mg tablet, Take 1 tablet (25 mg) by mouth once daily at bedtime., Disp: 30 tablet, Rfl: 8    ibuprofen 600 mg tablet, Take 1 tablet (600 mg) by mouth every 8 hours if needed (pain)., Disp: , Rfl:     loratadine (Claritin) 10 mg tablet, Take 1 tablet (10 mg) by mouth once daily as needed., Disp: , Rfl:      omeprazole (PriLOSEC) 40 mg DR capsule, Take 1 capsule (40 mg) by mouth once daily in the morning. Take before meals., Disp: 90 capsule, Rfl: 1    ondansetron ODT (Zofran-ODT) 4 mg disintegrating tablet, Take 1 tablet (4 mg) by mouth every 6 hours if needed for nausea., Disp: , Rfl:     tamsulosin (Flomax) 0.4 mg 24 hr capsule, Take 1 capsule by mouth once daily at bedtime, Disp: 90 capsule, Rfl: 1    EPINEPHrine (Epipen) 0.3 mg/0.3 mL injection syringe, Inject 0.3 mL (0.3 mg) into the muscle if needed for anaphylaxis for up to 1 day. Call 911 after use., Disp: 2 each, Rfl: 2     Objective   /75   Pulse 104   Wt 62.3 kg (137 lb 4.8 oz)   SpO2 98%   BMI 21.50 kg/m²     Physical Exam  Constitutional:       General: She is not in acute distress.     Appearance: Normal appearance. She is not ill-appearing or toxic-appearing.   HENT:      Head: Normocephalic and atraumatic.      Right Ear: Tympanic membrane, ear canal and external ear normal.      Left Ear: Tympanic membrane, ear canal and external ear normal.      Nose: Nose normal. No congestion or rhinorrhea.      Mouth/Throat:      Mouth: Mucous membranes are moist.      Pharynx: Oropharynx is clear. No oropharyngeal exudate or posterior oropharyngeal erythema.   Eyes:      Conjunctiva/sclera: Conjunctivae normal.      Pupils: Pupils are equal, round, and reactive to light.   Neck:      Vascular: No carotid bruit.   Cardiovascular:      Rate and Rhythm: Normal rate and regular rhythm.      Pulses: Normal pulses.      Heart sounds: Normal heart sounds. No murmur heard.  Pulmonary:      Effort: Pulmonary effort is normal.      Breath sounds: Normal breath sounds. No wheezing, rhonchi or rales.   Abdominal:      General: Bowel sounds are normal. There is no distension.      Palpations: Abdomen is soft. There is no mass.      Tenderness: There is abdominal tenderness in the right lower quadrant, suprapubic area and left lower quadrant. There is no guarding or  rebound.   Musculoskeletal:         General: Normal range of motion.      Cervical back: Normal range of motion and neck supple. No tenderness.      Right lower leg: No edema.      Left lower leg: No edema.   Lymphadenopathy:      Cervical: No cervical adenopathy.   Skin:     General: Skin is warm and dry.      Findings: No rash.   Neurological:      Mental Status: She is alert and oriented to person, place, and time.   Psychiatric:         Mood and Affect: Mood normal.         Behavior: Behavior normal.         Assessment/Plan   Problem List Items Addressed This Visit    None  Visit Diagnoses       Hot flashes    -  Primary    Relevant Orders    TSH with reflex to Free T4 if abnormal (Completed)    Palpitations        Relevant Orders    Holter Or Event Cardiac Monitor    Dizziness        Relevant Orders    Autonomic Testing    CBC and Auto Differential (Completed)    Comprehensive Metabolic Panel (Completed)    Tachycardia        Relevant Orders    Autonomic Testing          Palpitations   Zio ordered   Has had cardiac stress test in the past 8/2023 - which was normal     Hot flashes and palpitations and tachycardia   Check TSH     Tachycardia and dizziness  Also consider POTS?   Tilt table testing   CMP, CBC    Discussed at visit any disease processes that were of concern as well as the risks, benefits and instructions on any new medication provided. Patient (and/or caretaker of patient if present) stated all questions were answered, and they voiced understanding of instructions.

## 2024-04-24 ENCOUNTER — HOSPITAL ENCOUNTER (EMERGENCY)
Facility: HOSPITAL | Age: 24
Discharge: HOME | End: 2024-04-24
Attending: EMERGENCY MEDICINE
Payer: MEDICARE

## 2024-04-24 ENCOUNTER — APPOINTMENT (OUTPATIENT)
Dept: RADIOLOGY | Facility: HOSPITAL | Age: 24
End: 2024-04-24
Payer: MEDICARE

## 2024-04-24 VITALS
TEMPERATURE: 98.6 F | RESPIRATION RATE: 18 BRPM | WEIGHT: 137 LBS | SYSTOLIC BLOOD PRESSURE: 131 MMHG | HEART RATE: 97 BPM | OXYGEN SATURATION: 100 % | DIASTOLIC BLOOD PRESSURE: 87 MMHG | BODY MASS INDEX: 21.5 KG/M2 | HEIGHT: 67 IN

## 2024-04-24 DIAGNOSIS — R10.2 PELVIC PAIN: Primary | ICD-10-CM

## 2024-04-24 DIAGNOSIS — Z04.1 EXAM FOLLOWING MVC (MOTOR VEHICLE COLLISION), NO APPARENT INJURY: Primary | ICD-10-CM

## 2024-04-24 DIAGNOSIS — R30.0 DYSURIA: ICD-10-CM

## 2024-04-24 LAB — HCG UR QL IA.RAPID: NEGATIVE

## 2024-04-24 PROCEDURE — 72125 CT NECK SPINE W/O DYE: CPT

## 2024-04-24 PROCEDURE — 81025 URINE PREGNANCY TEST: CPT | Performed by: EMERGENCY MEDICINE

## 2024-04-24 PROCEDURE — 72125 CT NECK SPINE W/O DYE: CPT | Performed by: RADIOLOGY

## 2024-04-24 PROCEDURE — 70450 CT HEAD/BRAIN W/O DYE: CPT | Performed by: RADIOLOGY

## 2024-04-24 PROCEDURE — 70450 CT HEAD/BRAIN W/O DYE: CPT

## 2024-04-24 PROCEDURE — 99285 EMERGENCY DEPT VISIT HI MDM: CPT | Mod: 25

## 2024-04-24 RX ORDER — ONDANSETRON HYDROCHLORIDE 8 MG/1
8 TABLET, FILM COATED ORAL EVERY 8 HOURS PRN
Qty: 15 TABLET | Refills: 0 | Status: SHIPPED | OUTPATIENT
Start: 2024-04-24 | End: 2024-05-30 | Stop reason: ALTCHOICE

## 2024-04-24 RX ORDER — BACLOFEN 10 MG/1
10 TABLET ORAL 3 TIMES DAILY
Qty: 15 TABLET | Refills: 0 | Status: SHIPPED | OUTPATIENT
Start: 2024-04-24 | End: 2024-05-30

## 2024-04-24 ASSESSMENT — COLUMBIA-SUICIDE SEVERITY RATING SCALE - C-SSRS
1. IN THE PAST MONTH, HAVE YOU WISHED YOU WERE DEAD OR WISHED YOU COULD GO TO SLEEP AND NOT WAKE UP?: NO
6. HAVE YOU EVER DONE ANYTHING, STARTED TO DO ANYTHING, OR PREPARED TO DO ANYTHING TO END YOUR LIFE?: NO
2. HAVE YOU ACTUALLY HAD ANY THOUGHTS OF KILLING YOURSELF?: NO
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
## 2024-04-25 ENCOUNTER — TELEPHONE (OUTPATIENT)
Dept: PRIMARY CARE | Facility: CLINIC | Age: 24
End: 2024-04-25
Payer: MEDICAID

## 2024-04-25 NOTE — TELEPHONE ENCOUNTER
Pt hasn't heard anything yet about the holter monitor. She wanted to let you know. Has hospital follow up apt with you on 5/3.

## 2024-04-26 ENCOUNTER — LAB (OUTPATIENT)
Dept: LAB | Facility: LAB | Age: 24
End: 2024-04-26
Payer: MEDICAID

## 2024-04-26 DIAGNOSIS — R30.0 DYSURIA: ICD-10-CM

## 2024-04-26 DIAGNOSIS — R10.2 PELVIC PAIN: ICD-10-CM

## 2024-04-26 LAB
APPEARANCE UR: ABNORMAL
BILIRUB UR STRIP.AUTO-MCNC: NEGATIVE MG/DL
COLOR UR: YELLOW
GLUCOSE UR STRIP.AUTO-MCNC: NEGATIVE MG/DL
KETONES UR STRIP.AUTO-MCNC: NEGATIVE MG/DL
LEUKOCYTE ESTERASE UR QL STRIP.AUTO: NEGATIVE
NITRITE UR QL STRIP.AUTO: NEGATIVE
PH UR STRIP.AUTO: 6 [PH]
PROT UR STRIP.AUTO-MCNC: NEGATIVE MG/DL
RBC # UR STRIP.AUTO: NEGATIVE /UL
SP GR UR STRIP.AUTO: 1.03
UROBILINOGEN UR STRIP.AUTO-MCNC: 4 MG/DL

## 2024-04-26 PROCEDURE — 81003 URINALYSIS AUTO W/O SCOPE: CPT

## 2024-04-26 NOTE — TELEPHONE ENCOUNTER
Apt made for 5/14 at 11:00. Notified pt and gave her the information and their number [phone]

## 2024-04-30 ENCOUNTER — TELEPHONE (OUTPATIENT)
Dept: UROLOGY | Facility: CLINIC | Age: 24
End: 2024-04-30

## 2024-04-30 ENCOUNTER — OFFICE VISIT (OUTPATIENT)
Dept: UROLOGY | Facility: CLINIC | Age: 24
End: 2024-04-30
Payer: MEDICAID

## 2024-04-30 VITALS
HEIGHT: 67 IN | HEART RATE: 109 BPM | BODY MASS INDEX: 21.5 KG/M2 | DIASTOLIC BLOOD PRESSURE: 81 MMHG | SYSTOLIC BLOOD PRESSURE: 132 MMHG | WEIGHT: 137 LBS

## 2024-04-30 DIAGNOSIS — N32.89 BLADDER SPASM: ICD-10-CM

## 2024-04-30 DIAGNOSIS — N76.0 ACUTE VAGINITIS: ICD-10-CM

## 2024-04-30 DIAGNOSIS — R35.0 URINARY FREQUENCY: Primary | ICD-10-CM

## 2024-04-30 DIAGNOSIS — N39.8 VOIDING DYSFUNCTION: ICD-10-CM

## 2024-04-30 DIAGNOSIS — N30.10 INTERSTITIAL CYSTITIS: ICD-10-CM

## 2024-04-30 LAB
POC APPEARANCE, URINE: CLEAR
POC BILIRUBIN, URINE: NEGATIVE
POC BLOOD, URINE: NEGATIVE
POC COLOR, URINE: YELLOW
POC GLUCOSE, URINE: NEGATIVE MG/DL
POC KETONES, URINE: NEGATIVE MG/DL
POC LEUKOCYTES, URINE: ABNORMAL
POC NITRITE,URINE: NEGATIVE
POC PH, URINE: 6 PH
POC PROTEIN, URINE: NEGATIVE MG/DL
POC SPECIFIC GRAVITY, URINE: 1.02
POC UROBILINOGEN, URINE: 0.2 EU/DL

## 2024-04-30 PROCEDURE — 81003 URINALYSIS AUTO W/O SCOPE: CPT | Performed by: NURSE PRACTITIONER

## 2024-04-30 PROCEDURE — 1036F TOBACCO NON-USER: CPT | Performed by: NURSE PRACTITIONER

## 2024-04-30 PROCEDURE — 87205 SMEAR GRAM STAIN: CPT

## 2024-04-30 PROCEDURE — 51700 IRRIGATION OF BLADDER: CPT | Performed by: NURSE PRACTITIONER

## 2024-04-30 PROCEDURE — 99213 OFFICE O/P EST LOW 20 MIN: CPT | Performed by: NURSE PRACTITIONER

## 2024-04-30 RX ORDER — VIBEGRON 75 MG/1
1 TABLET, FILM COATED ORAL DAILY
Qty: 90 TABLET | Refills: 3 | Status: SHIPPED | OUTPATIENT
Start: 2024-04-30 | End: 2025-04-30

## 2024-04-30 RX ORDER — METHENAMINE, SODIUM PHOSPHATE, MONOBASIC, MONOHYDRATE, PHENYL SALICYLATE, METHYLENE BLUE, AND HYOSCYAMINE SULFATE 118; 40.8; 36; 10; .12 MG/1; MG/1; MG/1; MG/1; MG/1
1 CAPSULE ORAL DAILY PRN
Qty: 30 CAPSULE | Refills: 3 | Status: SHIPPED | OUTPATIENT
Start: 2024-04-30 | End: 2025-04-30

## 2024-04-30 RX ORDER — INDOMETHACIN 25 MG/1
5 CAPSULE ORAL ONCE
Status: COMPLETED | OUTPATIENT
Start: 2024-04-30 | End: 2024-04-30

## 2024-04-30 RX ORDER — GENTAMICIN 40 MG/ML
80 INJECTION, SOLUTION INTRAMUSCULAR; INTRAVENOUS ONCE
Status: COMPLETED | OUTPATIENT
Start: 2024-04-30 | End: 2024-04-30

## 2024-04-30 RX ORDER — TAMSULOSIN HYDROCHLORIDE 0.4 MG/1
0.4 CAPSULE ORAL DAILY
Qty: 90 CAPSULE | Refills: 3 | Status: SHIPPED | OUTPATIENT
Start: 2024-04-30 | End: 2025-04-30

## 2024-04-30 RX ORDER — LIDOCAINE HYDROCHLORIDE 10 MG/ML
20 INJECTION INFILTRATION; PERINEURAL ONCE
Status: COMPLETED | OUTPATIENT
Start: 2024-04-30 | End: 2024-04-30

## 2024-04-30 RX ORDER — TRIAMCINOLONE ACETONIDE 40 MG/ML
40 INJECTION, SUSPENSION INTRA-ARTICULAR; INTRAMUSCULAR ONCE
Status: COMPLETED | OUTPATIENT
Start: 2024-04-30 | End: 2024-04-30

## 2024-04-30 RX ADMIN — LIDOCAINE HYDROCHLORIDE 200 MG: 10 INJECTION INFILTRATION; PERINEURAL at 13:01

## 2024-04-30 RX ADMIN — TRIAMCINOLONE ACETONIDE 40 MG: 40 INJECTION, SUSPENSION INTRA-ARTICULAR; INTRAMUSCULAR at 13:02

## 2024-04-30 RX ADMIN — GENTAMICIN 80 MG: 40 INJECTION, SOLUTION INTRAMUSCULAR; INTRAVENOUS at 13:00

## 2024-04-30 RX ADMIN — INDOMETHACIN 5 MEQ: 25 CAPSULE ORAL at 12:39

## 2024-04-30 ASSESSMENT — PAIN SCALES - GENERAL: PAINLEVEL_OUTOF10: 0 - NO PAIN

## 2024-04-30 NOTE — PATIENT INSTRUCTIONS
Refills uribel as needed, tamsulosin, gemtesa  Bladder instillation would like to do q 2 weeks until discomfort better controlled  Vaginal culture sent  PFPT-will reestablish with either  PFPT or Hannah Soriano who she saw before  Follow up 2 weeks instillation

## 2024-04-30 NOTE — PROGRESS NOTES
"04/30/24   [id number]    Chief Complaint   Patient presents with    Pelvic Pain      Subjective      HPI Tracey Villa is a 24 y.o. female who presents for bladder discomfort, neg UTI, would like to resume bladder instillations, PFPT, needs refills gemtesa, tamsulosin; compounded pain cream from [facility] compounding pharmacy;    Trace leuk, PVR 12 cc      Objective     /81   Pulse 109   Ht 1.702 m (5' 7\")   Wt 62.1 kg (137 lb)   BMI 21.46 kg/m²    Physical Exam  General: Appears comfortable and in no apparent distress, well nourished  Head: Normocephalic, atraumatic  Neck: trachea midline  Respiratory: respirations unlabored, no wheezes, and no use of accessory muscles  Cardiovascular: at rest no dyspnea, well perfused  Skin: no visible rashes or lesions  Neurologic: grossly intact, oriented to person, place, and time  Psychiatric: mood and affect appropriate  Musculoskeletal: in chair for appt. no difficulty w upper body movement  Patient ID: Tracey Villa is a 24 y.o. female.    Bladder Catheterization    Date/Time: 4/30/2024 12:31 PM    Performed by: SANTOS Regalado  Authorized by: SANTOS Regalado    Procedure Details    Procedure: bladder irrigation      Catheter size: 14 Fr    Urine characteristics: clear    Post-Procedure Details     Outcome: patient tolerated procedure well with no complications      Additional Details      IC cocktail sodium bicar, lidocain, heparin, gentamicin          Assessment/Plan   Problem List Items Addressed This Visit          Genitourinary and Reproductive    Bladder spasm    Relevant Medications    methen-m.blue-s.phos-phsal-hyo (UribeL) 118-10-40.8-36 mg capsule    Other Relevant Orders    Referral to Physical Therapy    Interstitial cystitis    Relevant Medications    methen-m.blue-s.phos-phsal-hyo (UribeL) 118-10-40.8-36 mg capsule    Other Relevant Orders    Referral to Physical Therapy    Voiding dysfunction    Relevant " Medications    tamsulosin (Flomax) 0.4 mg 24 hr capsule    Other Relevant Orders    Referral to Physical Therapy     Other Visit Diagnoses       Urinary frequency    -  Primary    Relevant Medications    Gemtesa 75 mg tablet    Other Relevant Orders    POCT UA Automated manually resulted (Completed)    Post-Void Residual (Completed)    Referral to Physical Therapy          Orders Placed This Encounter   Procedures    Post-Void Residual    Referral to Physical Therapy     Standing Status:   Future     Standing Expiration Date:   10/30/2024     Referral Priority:   Routine     Referral Type:   Consultation     Referral Reason:   Specialty Services Required     Requested Specialty:   Physical Therapy     Number of Visits Requested:   1    POCT UA Automated manually resulted     Order Specific Question:   Release result to [facility]     Answer:   Immediate [1]      Refills uribel as needed, tamsulosin, gemtesa  Bladder instillation would like to do q 2 weeks until discomfort better controlled  Vaginal culture sent  PFPT-will reestablish with either  PFPT or Hannah Soriano who she saw before  Follow up 2 weeks instillation       TEODORO Regalado-CNP  Lab Results   Component Value Date    GLUCOSE 78 04/05/2024    CALCIUM 9.6 04/05/2024     04/05/2024    K 4.3 04/05/2024    CO2 26 04/05/2024     04/05/2024    BUN 19 04/05/2024    CREATININE 0.89 04/05/2024

## 2024-05-01 LAB
CLUE CELLS VAG LPF-#/AREA: NORMAL /[LPF]
NUGENT SCORE: 2
YEAST VAG WET PREP-#/AREA: NORMAL

## 2024-05-03 ENCOUNTER — OFFICE VISIT (OUTPATIENT)
Dept: PRIMARY CARE | Facility: CLINIC | Age: 24
End: 2024-05-03
Payer: MEDICAID

## 2024-05-03 VITALS
DIASTOLIC BLOOD PRESSURE: 83 MMHG | SYSTOLIC BLOOD PRESSURE: 117 MMHG | BODY MASS INDEX: 22.24 KG/M2 | OXYGEN SATURATION: 98 % | WEIGHT: 142 LBS | HEART RATE: 101 BPM

## 2024-05-03 DIAGNOSIS — M54.2 NECK PAIN: Primary | ICD-10-CM

## 2024-05-03 DIAGNOSIS — S06.0X0D CONCUSSION WITHOUT LOSS OF CONSCIOUSNESS, SUBSEQUENT ENCOUNTER: ICD-10-CM

## 2024-05-03 PROCEDURE — 99213 OFFICE O/P EST LOW 20 MIN: CPT

## 2024-05-03 PROCEDURE — 1036F TOBACCO NON-USER: CPT

## 2024-05-03 RX ORDER — TIZANIDINE 2 MG/1
2 TABLET ORAL EVERY 6 HOURS PRN
Qty: 30 TABLET | Refills: 0 | Status: SHIPPED | OUTPATIENT
Start: 2024-05-03 | End: 2024-05-16 | Stop reason: SDUPTHER
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
## 2024-05-03 NOTE — PATIENT INSTRUCTIONS
1 pill in 24 hours - let me know how this helps with migraine if no improvement then MRI    2 week follow up     Concussion protocol

## 2024-05-07 ENCOUNTER — TELEPHONE (OUTPATIENT)
Dept: PRIMARY CARE | Facility: CLINIC | Age: 24
End: 2024-05-07
Payer: MEDICAID

## 2024-05-07 DIAGNOSIS — G43.909 MIGRAINE WITHOUT STATUS MIGRAINOSUS, NOT INTRACTABLE, UNSPECIFIED MIGRAINE TYPE: ICD-10-CM

## 2024-05-07 DIAGNOSIS — G43.909 MIGRAINE WITHOUT STATUS MIGRAINOSUS, NOT INTRACTABLE, UNSPECIFIED MIGRAINE TYPE: Primary | ICD-10-CM

## 2024-05-07 RX ORDER — SUMATRIPTAN SUCCINATE 100 MG/1
100 TABLET ORAL ONCE AS NEEDED
Qty: 27 TABLET | Refills: 3 | Status: SHIPPED | OUTPATIENT
Start: 2024-05-07 | End: 2024-05-08
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
## 2024-05-07 NOTE — TELEPHONE ENCOUNTER
I spoke with Tracey, she will schedule an appointment with eye doctor for pain behind her eyes. Also nurtec somewhat helped but did not completely treat all symptoms. Still having headaches. Also still having neck pain. She is going to try heat on her neck and see how this helps. MRI order placed. Discussed if her symptoms worsen she should go to the ER immediately.

## 2024-05-08 RX ORDER — SUMATRIPTAN SUCCINATE 100 MG/1
TABLET ORAL
Qty: 27 TABLET | Refills: 3 | Status: SHIPPED | OUTPATIENT
Start: 2024-05-08 | End: 2024-05-16

## 2024-05-14 ENCOUNTER — HOSPITAL ENCOUNTER (OUTPATIENT)
Dept: CARDIOLOGY | Facility: HOSPITAL | Age: 24
Discharge: HOME | End: 2024-05-14
Payer: MEDICAID

## 2024-05-14 DIAGNOSIS — R00.2 PALPITATIONS: ICD-10-CM

## 2024-05-14 PROCEDURE — 93242 EXT ECG>48HR<7D RECORDING: CPT

## 2024-05-16 ENCOUNTER — OFFICE VISIT (OUTPATIENT)
Dept: PRIMARY CARE | Facility: CLINIC | Age: 24
End: 2024-05-16
Payer: MEDICAID

## 2024-05-16 ENCOUNTER — OFFICE VISIT (OUTPATIENT)
Dept: UROLOGY | Facility: CLINIC | Age: 24
End: 2024-05-16
Payer: MEDICAID

## 2024-05-16 VITALS
WEIGHT: 143 LBS | OXYGEN SATURATION: 99 % | SYSTOLIC BLOOD PRESSURE: 105 MMHG | BODY MASS INDEX: 22.4 KG/M2 | HEART RATE: 107 BPM | DIASTOLIC BLOOD PRESSURE: 77 MMHG

## 2024-05-16 VITALS
SYSTOLIC BLOOD PRESSURE: 121 MMHG | HEIGHT: 66 IN | WEIGHT: 143 LBS | DIASTOLIC BLOOD PRESSURE: 81 MMHG | HEART RATE: 94 BPM | BODY MASS INDEX: 22.98 KG/M2

## 2024-05-16 DIAGNOSIS — N30.10 INTERSTITIAL CYSTITIS: ICD-10-CM

## 2024-05-16 DIAGNOSIS — M25.512 ACUTE PAIN OF BOTH SHOULDERS: ICD-10-CM

## 2024-05-16 DIAGNOSIS — M25.511 ACUTE PAIN OF BOTH SHOULDERS: ICD-10-CM

## 2024-05-16 DIAGNOSIS — R35.0 URINARY FREQUENCY: ICD-10-CM

## 2024-05-16 DIAGNOSIS — M54.2 NECK PAIN: Primary | ICD-10-CM

## 2024-05-16 DIAGNOSIS — N32.89 BLADDER SPASM: Primary | ICD-10-CM

## 2024-05-16 LAB
POC APPEARANCE, URINE: CLEAR
POC BILIRUBIN, URINE: NEGATIVE
POC BLOOD, URINE: ABNORMAL
POC COLOR, URINE: YELLOW
POC GLUCOSE, URINE: NEGATIVE MG/DL
POC KETONES, URINE: NEGATIVE MG/DL
POC LEUKOCYTES, URINE: ABNORMAL
POC NITRITE,URINE: NEGATIVE
POC PH, URINE: 7 PH
POC PROTEIN, URINE: NEGATIVE MG/DL
POC SPECIFIC GRAVITY, URINE: 1.02
POC UROBILINOGEN, URINE: 0.2 EU/DL

## 2024-05-16 PROCEDURE — 81002 URINALYSIS NONAUTO W/O SCOPE: CPT | Performed by: NURSE PRACTITIONER

## 2024-05-16 PROCEDURE — 99213 OFFICE O/P EST LOW 20 MIN: CPT

## 2024-05-16 PROCEDURE — 1036F TOBACCO NON-USER: CPT

## 2024-05-16 PROCEDURE — 99212 OFFICE O/P EST SF 10 MIN: CPT | Performed by: NURSE PRACTITIONER

## 2024-05-16 PROCEDURE — 51700 IRRIGATION OF BLADDER: CPT | Performed by: NURSE PRACTITIONER

## 2024-05-16 PROCEDURE — 1036F TOBACCO NON-USER: CPT | Performed by: NURSE PRACTITIONER

## 2024-05-16 RX ORDER — TRIAMCINOLONE ACETONIDE 40 MG/ML
40 INJECTION, SUSPENSION INTRA-ARTICULAR; INTRAMUSCULAR ONCE
Status: COMPLETED | OUTPATIENT
Start: 2024-05-16 | End: 2024-05-16

## 2024-05-16 RX ORDER — GENTAMICIN 40 MG/ML
80 INJECTION, SOLUTION INTRAMUSCULAR; INTRAVENOUS ONCE
Status: COMPLETED | OUTPATIENT
Start: 2024-05-16 | End: 2024-05-16

## 2024-05-16 RX ORDER — INDOMETHACIN 25 MG/1
5 CAPSULE ORAL ONCE
Status: COMPLETED | OUTPATIENT
Start: 2024-05-16 | End: 2024-06-06

## 2024-05-16 RX ORDER — LIDOCAINE HYDROCHLORIDE 10 MG/ML
20 INJECTION INFILTRATION; PERINEURAL ONCE
Status: COMPLETED | OUTPATIENT
Start: 2024-05-16 | End: 2024-05-16

## 2024-05-16 RX ORDER — TIZANIDINE 2 MG/1
2 TABLET ORAL EVERY 6 HOURS PRN
Qty: 30 TABLET | Refills: 0 | Status: SHIPPED | OUTPATIENT
Start: 2024-05-16 | End: 2024-05-26

## 2024-05-16 RX ADMIN — GENTAMICIN 80 MG: 40 INJECTION, SOLUTION INTRAMUSCULAR; INTRAVENOUS at 16:07

## 2024-05-16 RX ADMIN — TRIAMCINOLONE ACETONIDE 40 MG: 40 INJECTION, SUSPENSION INTRA-ARTICULAR; INTRAMUSCULAR at 16:09

## 2024-05-16 RX ADMIN — LIDOCAINE HYDROCHLORIDE 200 MG: 10 INJECTION INFILTRATION; PERINEURAL at 16:08

## 2024-05-16 ASSESSMENT — PAIN SCALES - GENERAL: PAINLEVEL_OUTOF10: 0 - NO PAIN
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
## 2024-05-16 NOTE — PATIENT INSTRUCTIONS
Physical therapy - Myofit   Muscle relaxer as needed  If no improvement with 3-4 weeks then come back and we need mri

## 2024-05-16 NOTE — PATIENT INSTRUCTIONS
Bladder instillation monthly now unless needs one sooner for flare up  Amitriptyine  Dread Garcia PRN  Nurse line [phone]

## 2024-05-16 NOTE — PROGRESS NOTES
"05/16/24   [id number]    Bladder instillation     Subjective      HPI Tracey Villa is a 24 y.o. female who presents for bladder instillation, doing much better, no more bladder pain;     PMH, PSH, FH, SH reviewed        Objective     /81   Pulse 94   Ht 1.676 m (5' 6\")   Wt 64.9 kg (143 lb)   BMI 23.08 kg/m²    Physical Exam  General: Appears comfortable and in no apparent distress, well nourished  Head: Normocephalic, atraumatic  Neck: trachea midline  Respiratory: respirations unlabored, no wheezes, and no use of accessory muscles  Cardiovascular: at rest no dyspnea, well perfused  Skin: no visible rashes or lesions  Neurologic: grossly intact, oriented to person, place, and time  Psychiatric: mood and affect appropriate  Musculoskeletal: in chair for appt. no difficulty w upper body movement      Assessment/Plan   Problem List Items Addressed This Visit          Genitourinary and Reproductive    Bladder spasm - Primary    Relevant Medications    triamcinolone acetonide (Kenalog-40) injection 40 mg (Start on 5/16/2024  2:00 PM)    gentamicin (Garamycin) injection 80 mg (Start on 5/16/2024  2:00 PM)    sodium bicarbonate 1 mEq/mL (8.4 %) injection 5 mEq (Start on 5/16/2024  2:00 PM)    lidocaine (Xylocaine) 10 mg/mL (1 %) injection 200 mg (Start on 5/16/2024  2:00 PM)    Other Relevant Orders    POCT UA (nonautomated) manually resulted (Completed)    Interstitial cystitis    Relevant Medications    triamcinolone acetonide (Kenalog-40) injection 40 mg (Start on 5/16/2024  2:00 PM)    gentamicin (Garamycin) injection 80 mg (Start on 5/16/2024  2:00 PM)    sodium bicarbonate 1 mEq/mL (8.4 %) injection 5 mEq (Start on 5/16/2024  2:00 PM)    lidocaine (Xylocaine) 10 mg/mL (1 %) injection 200 mg (Start on 5/16/2024  2:00 PM)     Other Visit Diagnoses       Urinary frequency        Relevant Medications    triamcinolone acetonide (Kenalog-40) injection 40 mg (Start on 5/16/2024  2:00 PM)    gentamicin " (Garamycin) injection 80 mg (Start on 5/16/2024  2:00 PM)    sodium bicarbonate 1 mEq/mL (8.4 %) injection 5 mEq (Start on 5/16/2024  2:00 PM)    lidocaine (Xylocaine) 10 mg/mL (1 %) injection 200 mg (Start on 5/16/2024  2:00 PM)          Orders Placed This Encounter   Procedures    POCT UA (nonautomated) manually resulted     Order Specific Question:   Release result to [facility]     Answer:   Immediate [1]           Bladder instillation monthly now unless needs one sooner for flare up  Amitriptyine  Gemtesa  Uribel PRN  Nurse line [phone]    Karlee Dasilva, APRN-CNP  Lab Results   Component Value Date    GLUCOSE 78 04/05/2024    CALCIUM 9.6 04/05/2024     04/05/2024    K 4.3 04/05/2024    CO2 26 04/05/2024     04/05/2024    BUN 19 04/05/2024    CREATININE 0.89 04/05/2024

## 2024-05-18 ENCOUNTER — PATIENT MESSAGE (OUTPATIENT)
Dept: OBSTETRICS AND GYNECOLOGY | Facility: CLINIC | Age: 24
End: 2024-05-18
Payer: MEDICAID

## 2024-05-18 DIAGNOSIS — Z30.41 ENCOUNTER FOR SURVEILLANCE OF CONTRACEPTIVE PILLS: ICD-10-CM

## 2024-05-20 RX ORDER — DROSPIRENONE 4 MG/1
4 TABLET, FILM COATED ORAL DAILY
Qty: 90 TABLET | Refills: 3 | Status: SHIPPED | OUTPATIENT
Start: 2024-05-20 | End: 2024-11-16

## 2024-05-22 ENCOUNTER — HOSPITAL ENCOUNTER (OUTPATIENT)
Dept: RADIOLOGY | Facility: HOSPITAL | Age: 24
Discharge: HOME | End: 2024-05-22
Payer: MEDICAID

## 2024-05-22 DIAGNOSIS — G43.909 MIGRAINE WITHOUT STATUS MIGRAINOSUS, NOT INTRACTABLE, UNSPECIFIED MIGRAINE TYPE: ICD-10-CM

## 2024-05-22 PROCEDURE — 70551 MRI BRAIN STEM W/O DYE: CPT | Performed by: RADIOLOGY

## 2024-05-22 PROCEDURE — 70551 MRI BRAIN STEM W/O DYE: CPT

## 2024-05-30 ENCOUNTER — OFFICE VISIT (OUTPATIENT)
Dept: PRIMARY CARE | Facility: CLINIC | Age: 24
End: 2024-05-30
Payer: MEDICAID

## 2024-05-30 VITALS
BODY MASS INDEX: 21.82 KG/M2 | HEIGHT: 68 IN | OXYGEN SATURATION: 97 % | HEART RATE: 100 BPM | WEIGHT: 144 LBS | DIASTOLIC BLOOD PRESSURE: 78 MMHG | SYSTOLIC BLOOD PRESSURE: 124 MMHG

## 2024-05-30 DIAGNOSIS — G89.29 CHRONIC NONINTRACTABLE HEADACHE, UNSPECIFIED HEADACHE TYPE: Primary | ICD-10-CM

## 2024-05-30 DIAGNOSIS — R51.9 CHRONIC NONINTRACTABLE HEADACHE, UNSPECIFIED HEADACHE TYPE: Primary | ICD-10-CM

## 2024-05-30 PROCEDURE — 1036F TOBACCO NON-USER: CPT

## 2024-05-30 PROCEDURE — 99213 OFFICE O/P EST LOW 20 MIN: CPT

## 2024-05-30 NOTE — PATIENT INSTRUCTIONS
Kayla Sierra, CNP - Neurology - [facility] -[phone]  Dr Emily Castorena [facility] -[phone]
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
## 2024-06-06 ENCOUNTER — OFFICE VISIT (OUTPATIENT)
Dept: UROLOGY | Facility: CLINIC | Age: 24
End: 2024-06-06
Payer: MEDICAID

## 2024-06-06 VITALS
BODY MASS INDEX: 22.6 KG/M2 | WEIGHT: 144 LBS | HEIGHT: 67 IN | DIASTOLIC BLOOD PRESSURE: 76 MMHG | SYSTOLIC BLOOD PRESSURE: 118 MMHG | HEART RATE: 101 BPM

## 2024-06-06 DIAGNOSIS — N30.10 IC (INTERSTITIAL CYSTITIS): Primary | ICD-10-CM

## 2024-06-06 DIAGNOSIS — N32.89 BLADDER SPASM: ICD-10-CM

## 2024-06-06 DIAGNOSIS — R35.0 URINARY FREQUENCY: ICD-10-CM

## 2024-06-06 LAB
POC APPEARANCE, URINE: CLEAR
POC BILIRUBIN, URINE: NEGATIVE
POC BLOOD, URINE: NEGATIVE
POC COLOR, URINE: YELLOW
POC GLUCOSE, URINE: NEGATIVE MG/DL
POC KETONES, URINE: NEGATIVE MG/DL
POC LEUKOCYTES, URINE: NEGATIVE
POC NITRITE,URINE: NEGATIVE
POC PH, URINE: 6.5 PH
POC PROTEIN, URINE: NEGATIVE MG/DL
POC SPECIFIC GRAVITY, URINE: 1.02
POC UROBILINOGEN, URINE: 1 EU/DL

## 2024-06-06 PROCEDURE — 1036F TOBACCO NON-USER: CPT | Performed by: NURSE PRACTITIONER

## 2024-06-06 PROCEDURE — 99212 OFFICE O/P EST SF 10 MIN: CPT | Performed by: NURSE PRACTITIONER

## 2024-06-06 PROCEDURE — 81003 URINALYSIS AUTO W/O SCOPE: CPT | Performed by: NURSE PRACTITIONER

## 2024-06-06 PROCEDURE — 51700 IRRIGATION OF BLADDER: CPT | Performed by: NURSE PRACTITIONER

## 2024-06-06 RX ORDER — LIDOCAINE HYDROCHLORIDE 10 MG/ML
20 INJECTION INFILTRATION; PERINEURAL ONCE
Status: COMPLETED | OUTPATIENT
Start: 2024-06-06 | End: 2024-06-06

## 2024-06-06 RX ORDER — AMITRIPTYLINE HYDROCHLORIDE 10 MG/1
40 TABLET, FILM COATED ORAL NIGHTLY
COMMUNITY
Start: 2024-05-30

## 2024-06-06 RX ORDER — GENTAMICIN 40 MG/ML
80 INJECTION, SOLUTION INTRAMUSCULAR; INTRAVENOUS ONCE
Status: COMPLETED | OUTPATIENT
Start: 2024-06-06 | End: 2024-06-06

## 2024-06-06 RX ORDER — INDOMETHACIN 25 MG/1
5 CAPSULE ORAL ONCE
Status: COMPLETED | OUTPATIENT
Start: 2024-06-06 | End: 2024-06-06

## 2024-06-06 RX ORDER — TRIAMCINOLONE ACETONIDE 40 MG/ML
40 INJECTION, SUSPENSION INTRA-ARTICULAR; INTRAMUSCULAR ONCE
Status: COMPLETED | OUTPATIENT
Start: 2024-06-06 | End: 2024-06-06

## 2024-06-06 RX ADMIN — GENTAMICIN 80 MG: 40 INJECTION, SOLUTION INTRAMUSCULAR; INTRAVENOUS at 10:04

## 2024-06-06 RX ADMIN — LIDOCAINE HYDROCHLORIDE 200 MG: 10 INJECTION INFILTRATION; PERINEURAL at 10:05

## 2024-06-06 RX ADMIN — INDOMETHACIN 5 MEQ: 25 CAPSULE ORAL at 16:43

## 2024-06-06 RX ADMIN — TRIAMCINOLONE ACETONIDE 40 MG: 40 INJECTION, SUSPENSION INTRA-ARTICULAR; INTRAMUSCULAR at 10:04

## 2024-06-06 ASSESSMENT — PAIN SCALES - GENERAL: PAINLEVEL_OUTOF10: 0 - NO PAIN
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
## 2024-06-10 ENCOUNTER — OFFICE VISIT (OUTPATIENT)
Dept: PRIMARY CARE | Facility: CLINIC | Age: 24
End: 2024-06-10
Payer: MEDICAID

## 2024-06-10 VITALS
HEART RATE: 61 BPM | DIASTOLIC BLOOD PRESSURE: 74 MMHG | BODY MASS INDEX: 21.82 KG/M2 | WEIGHT: 144 LBS | TEMPERATURE: 98.4 F | HEIGHT: 68 IN | OXYGEN SATURATION: 99 % | SYSTOLIC BLOOD PRESSURE: 118 MMHG

## 2024-06-10 DIAGNOSIS — S06.0X0A CONCUSSION WITHOUT LOSS OF CONSCIOUSNESS, INITIAL ENCOUNTER: ICD-10-CM

## 2024-06-10 DIAGNOSIS — G44.201 ACUTE INTRACTABLE TENSION-TYPE HEADACHE: Primary | ICD-10-CM

## 2024-06-10 PROCEDURE — 99213 OFFICE O/P EST LOW 20 MIN: CPT | Performed by: INTERNAL MEDICINE

## 2024-06-10 RX ORDER — SUMATRIPTAN 50 MG/1
50 TABLET, FILM COATED ORAL ONCE AS NEEDED
Qty: 27 TABLET | Refills: 3 | Status: SHIPPED | OUTPATIENT
Start: 2024-06-10 | End: 2025-06-10

## 2024-06-10 ASSESSMENT — ENCOUNTER SYMPTOMS
COUGH: 0
ABDOMINAL PAIN: 0
COLOR CHANGE: 0
SORE THROAT: 0
ADENOPATHY: 0
FEVER: 0
CONFUSION: 0
FACIAL ASYMMETRY: 0
TROUBLE SWALLOWING: 0
ARTHRALGIAS: 0
APPETITE CHANGE: 0
TREMORS: 0
CHEST TIGHTNESS: 0
BACK PAIN: 0
MYALGIAS: 0
ACTIVITY CHANGE: 0
POLYPHAGIA: 0
PHOTOPHOBIA: 0
DIARRHEA: 0
WEAKNESS: 0
NERVOUS/ANXIOUS: 0
POLYDIPSIA: 0
BLOOD IN STOOL: 0
CONSTIPATION: 0
NUMBNESS: 0
HALLUCINATIONS: 0
SLEEP DISTURBANCE: 0
HEMATURIA: 0
SEIZURES: 0
PALPITATIONS: 0
JOINT SWELLING: 0
FLANK PAIN: 0
HEADACHES: 1
EYE PAIN: 0
SINUS PAIN: 0
NECK PAIN: 0
DYSURIA: 0
STRIDOR: 0
SPEECH DIFFICULTY: 0
WHEEZING: 0
SHORTNESS OF BREATH: 0
DIZZINESS: 0
VOMITING: 0
NAUSEA: 0
FATIGUE: 0
WOUND: 0
VOICE CHANGE: 0
UNEXPECTED WEIGHT CHANGE: 0
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
## 2024-06-10 NOTE — ASSESSMENT & PLAN NOTE
Physical exam is not suggestive of Nystagmus or any other motor neurological deficit.  Patient has been given excuse until   DO NOT INVOLVE IN ANY CONTACT SPORTS. DO NOT DO OFF ROAD DRIVING. BE CAREFUL WITH BUMPS ON THE ROADS.  Avoid sudden acceleration or deceleration as much as possible.  Do not work at height to avoid the risk of fall.  Avoid Alcohol/ Smoking/ Recreational drugs.  Avoid loud noises and bright fluctuating lights.

## 2024-06-12 ENCOUNTER — APPOINTMENT (OUTPATIENT)
Dept: PRIMARY CARE | Facility: CLINIC | Age: 24
End: 2024-06-12
Payer: MEDICAID

## 2024-06-12 ENCOUNTER — APPOINTMENT (OUTPATIENT)
Dept: OBSTETRICS AND GYNECOLOGY | Facility: CLINIC | Age: 24
End: 2024-06-12
Payer: MEDICAID

## 2024-06-12 VITALS
SYSTOLIC BLOOD PRESSURE: 114 MMHG | WEIGHT: 141.6 LBS | HEIGHT: 67 IN | DIASTOLIC BLOOD PRESSURE: 74 MMHG | BODY MASS INDEX: 22.22 KG/M2

## 2024-06-12 DIAGNOSIS — Z30.41 ENCOUNTER FOR SURVEILLANCE OF CONTRACEPTIVE PILLS: ICD-10-CM

## 2024-06-12 DIAGNOSIS — R10.2 PELVIC PAIN: ICD-10-CM

## 2024-06-12 DIAGNOSIS — Z12.4 CERVICAL CANCER SCREENING: ICD-10-CM

## 2024-06-12 DIAGNOSIS — Z01.419 WELL WOMAN EXAM WITH ROUTINE GYNECOLOGICAL EXAM: Primary | ICD-10-CM

## 2024-06-12 PROCEDURE — 1036F TOBACCO NON-USER: CPT | Performed by: NURSE PRACTITIONER

## 2024-06-12 PROCEDURE — 99395 PREV VISIT EST AGE 18-39: CPT | Performed by: NURSE PRACTITIONER

## 2024-06-12 RX ORDER — DROSPIRENONE 4 MG/1
4 TABLET, FILM COATED ORAL DAILY
Qty: 90 TABLET | Refills: 3 | Status: SHIPPED | OUTPATIENT
Start: 2024-06-12 | End: 2025-06-12

## 2024-06-12 ASSESSMENT — ENCOUNTER SYMPTOMS
BACK PAIN: 1
CARDIOVASCULAR NEGATIVE: 1
NAUSEA: 1
ENDOCRINE NEGATIVE: 1
DIZZINESS: 1
UNEXPECTED WEIGHT CHANGE: 1
RESPIRATORY NEGATIVE: 1
EYES NEGATIVE: 1
ALLERGIC/IMMUNOLOGIC NEGATIVE: 1
BRUISES/BLEEDS EASILY: 1
HEADACHES: 1
ABDOMINAL PAIN: 1
FATIGUE: 1
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
## 2024-06-13 ENCOUNTER — HOSPITAL ENCOUNTER (OUTPATIENT)
Dept: NEUROLOGY | Facility: CLINIC | Age: 24
Discharge: HOME | End: 2024-06-13
Payer: MEDICAID

## 2024-06-13 DIAGNOSIS — R42 DIZZINESS: ICD-10-CM

## 2024-06-13 DIAGNOSIS — R00.0 TACHYCARDIA: ICD-10-CM

## 2024-06-13 PROCEDURE — 95924 ANS PARASYMP & SYMP W/TILT: CPT | Performed by: PSYCHIATRY & NEUROLOGY

## 2024-06-13 PROCEDURE — 95923 AUTONOMIC NRV SYST FUNJ TEST: CPT | Performed by: PSYCHIATRY & NEUROLOGY

## 2024-06-17 ENCOUNTER — APPOINTMENT (OUTPATIENT)
Dept: PRIMARY CARE | Facility: CLINIC | Age: 24
End: 2024-06-17
Payer: MEDICAID

## 2024-06-17 ENCOUNTER — TELEPHONE (OUTPATIENT)
Dept: PRIMARY CARE | Facility: CLINIC | Age: 24
End: 2024-06-17

## 2024-06-17 VITALS
SYSTOLIC BLOOD PRESSURE: 118 MMHG | OXYGEN SATURATION: 98 % | HEART RATE: 119 BPM | DIASTOLIC BLOOD PRESSURE: 70 MMHG | WEIGHT: 145.2 LBS | BODY MASS INDEX: 22.74 KG/M2

## 2024-06-17 DIAGNOSIS — G90.A POTS (POSTURAL ORTHOSTATIC TACHYCARDIA SYNDROME): Primary | ICD-10-CM

## 2024-06-17 DIAGNOSIS — T63.441A BEE STING, ACCIDENTAL OR UNINTENTIONAL, INITIAL ENCOUNTER: ICD-10-CM

## 2024-06-17 PROCEDURE — 1036F TOBACCO NON-USER: CPT

## 2024-06-17 PROCEDURE — 99214 OFFICE O/P EST MOD 30 MIN: CPT

## 2024-06-17 RX ORDER — TRIAMCINOLONE ACETONIDE 1 MG/G
CREAM TOPICAL 2 TIMES DAILY
Qty: 30 G | Refills: 0 | Status: SHIPPED | OUTPATIENT
Start: 2024-06-17

## 2024-06-17 NOTE — TELEPHONE ENCOUNTER
Sandra from [facility] called said pt stopped over to make apt with them but they need an order. I didn't see anything in the chart yet but told them I would let you know.

## 2024-06-17 NOTE — PATIENT INSTRUCTIONS
We will start with conservative management of POTS prior to trying medications. Increase oral fluids with a goal of 3 liters per daily and increase salt intake with a goal of 8 to 12 grams per day. This can be obtained from table salt, sports beverages, salt tablets. We discussed the effects of salt on blood pressure. Exercises can include recumbent biking, rowing, or swimming, as well as lower extremity resistance training to help reduce swelling in the legs. You may need to have supervision when you start these exercise programs to monitor your symptoms. Try to remain upright and avoid prolonged bed rest. You can also wear compression stockings or abdominal binders to help prevent fluids from pooling in your legs.      The following conservative measures have been shown to be helpful in POTS:    - To drink one gallon fluids / day  - To increase salt intake up to 3 tea spoons/day  -To use compression stockings 30-40 mm/hg and high waist during the day time  - To perform waterjogging for one hour a day: ( running in the shallow water at the end of the pool )  - To raise the head of the bed ( bed head elevation about 45 degrees).
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
## 2024-06-18 ENCOUNTER — APPOINTMENT (OUTPATIENT)
Dept: RADIOLOGY | Facility: HOSPITAL | Age: 24
End: 2024-06-18
Payer: MEDICAID

## 2024-06-18 DIAGNOSIS — G90.A POTS (POSTURAL ORTHOSTATIC TACHYCARDIA SYNDROME): Primary | ICD-10-CM

## 2024-06-18 DIAGNOSIS — S06.0X0D CONCUSSION WITHOUT LOSS OF CONSCIOUSNESS, SUBSEQUENT ENCOUNTER: ICD-10-CM

## 2024-06-26 LAB
CYTOLOGY CMNT CVX/VAG CYTO-IMP: NORMAL
LAB AP CONTRACEPTIVE HISTORY: NORMAL
LAB AP HPV GENOTYPE QUESTION: YES
LAB AP HPV HR: NORMAL
LABORATORY COMMENT REPORT: NORMAL
PATH REPORT.TOTAL CANCER: NORMAL

## 2024-06-27 DIAGNOSIS — K21.9 CHRONIC GERD: ICD-10-CM

## 2024-06-27 RX ORDER — OMEPRAZOLE 40 MG/1
40 CAPSULE, DELAYED RELEASE ORAL
Qty: 90 CAPSULE | Refills: 0 | Status: SHIPPED | OUTPATIENT
Start: 2024-06-27

## 2024-07-09 ENCOUNTER — APPOINTMENT (OUTPATIENT)
Dept: UROLOGY | Facility: CLINIC | Age: 24
End: 2024-07-09
Payer: MEDICAID

## 2024-07-19 ENCOUNTER — APPOINTMENT (OUTPATIENT)
Dept: RADIOLOGY | Facility: HOSPITAL | Age: 24
End: 2024-07-19
Payer: MEDICAID

## 2024-07-19 ENCOUNTER — APPOINTMENT (OUTPATIENT)
Dept: UROLOGY | Facility: CLINIC | Age: 24
End: 2024-07-19
Payer: MEDICAID

## 2024-07-19 VITALS
WEIGHT: 140 LBS | TEMPERATURE: 97.5 F | HEIGHT: 67 IN | DIASTOLIC BLOOD PRESSURE: 82 MMHG | SYSTOLIC BLOOD PRESSURE: 125 MMHG | HEART RATE: 97 BPM | BODY MASS INDEX: 21.97 KG/M2

## 2024-07-19 DIAGNOSIS — N30.10 IC (INTERSTITIAL CYSTITIS): ICD-10-CM

## 2024-07-19 DIAGNOSIS — N32.89 BLADDER SPASM: Primary | ICD-10-CM

## 2024-07-19 DIAGNOSIS — N39.8 VOIDING DYSFUNCTION: ICD-10-CM

## 2024-07-19 DIAGNOSIS — R35.0 URINARY FREQUENCY: ICD-10-CM

## 2024-07-19 LAB
POC APPEARANCE, URINE: CLEAR
POC BILIRUBIN, URINE: NEGATIVE
POC BLOOD, URINE: NEGATIVE
POC COLOR, URINE: YELLOW
POC GLUCOSE, URINE: NEGATIVE MG/DL
POC KETONES, URINE: NEGATIVE MG/DL
POC LEUKOCYTES, URINE: NEGATIVE
POC NITRITE,URINE: NEGATIVE
POC PH, URINE: 6 PH
POC PROTEIN, URINE: NEGATIVE MG/DL
POC SPECIFIC GRAVITY, URINE: 1.01
POC UROBILINOGEN, URINE: 0.2 EU/DL

## 2024-07-19 PROCEDURE — 51700 IRRIGATION OF BLADDER: CPT | Performed by: NURSE PRACTITIONER

## 2024-07-19 PROCEDURE — 99213 OFFICE O/P EST LOW 20 MIN: CPT | Performed by: NURSE PRACTITIONER

## 2024-07-19 PROCEDURE — 1036F TOBACCO NON-USER: CPT | Performed by: NURSE PRACTITIONER

## 2024-07-19 PROCEDURE — 3008F BODY MASS INDEX DOCD: CPT | Performed by: NURSE PRACTITIONER

## 2024-07-19 PROCEDURE — 81003 URINALYSIS AUTO W/O SCOPE: CPT | Performed by: NURSE PRACTITIONER

## 2024-07-19 RX ORDER — GENTAMICIN 40 MG/ML
80 INJECTION, SOLUTION INTRAMUSCULAR; INTRAVENOUS ONCE
Status: COMPLETED | OUTPATIENT
Start: 2024-07-19 | End: 2024-07-19

## 2024-07-19 RX ORDER — TAMSULOSIN HYDROCHLORIDE 0.4 MG/1
0.8 CAPSULE ORAL NIGHTLY
Qty: 180 CAPSULE | Refills: 3 | Status: SHIPPED | OUTPATIENT
Start: 2024-07-19 | End: 2025-07-19

## 2024-07-19 RX ORDER — TRIAMCINOLONE ACETONIDE 40 MG/ML
40 INJECTION, SUSPENSION INTRA-ARTICULAR; INTRAMUSCULAR ONCE
Status: COMPLETED | OUTPATIENT
Start: 2024-07-19 | End: 2024-07-19

## 2024-07-19 RX ORDER — INDOMETHACIN 25 MG/1
5 CAPSULE ORAL ONCE
Status: COMPLETED | OUTPATIENT
Start: 2024-07-19 | End: 2024-07-19

## 2024-07-19 RX ORDER — LIDOCAINE HYDROCHLORIDE 20 MG/ML
10 INJECTION, SOLUTION INFILTRATION; PERINEURAL ONCE
Status: COMPLETED | OUTPATIENT
Start: 2024-07-19 | End: 2024-07-19

## 2024-07-19 ASSESSMENT — PAIN SCALES - GENERAL
PAINLEVEL: 3
PAINLEVEL_OUTOF10: 0 - NO PAIN

## 2024-07-19 NOTE — PATIENT INSTRUCTIONS
Prelief [employer]  Tamsulosin, can take two for a bit then go back to one  Gemtesa daily  Amitriptyline has increased to 40 mg  Has appt. For next instillation in August  Nurse line [phone]
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
## 2024-07-22 ENCOUNTER — OFFICE VISIT (OUTPATIENT)
Dept: PRIMARY CARE | Facility: CLINIC | Age: 24
End: 2024-07-22
Payer: MEDICAID

## 2024-07-22 ENCOUNTER — APPOINTMENT (OUTPATIENT)
Dept: NEUROLOGY | Facility: HOSPITAL | Age: 24
End: 2024-07-22
Payer: MEDICAID

## 2024-07-22 ENCOUNTER — HOSPITAL ENCOUNTER (OUTPATIENT)
Dept: RADIOLOGY | Facility: CLINIC | Age: 24
Discharge: HOME | End: 2024-07-22
Payer: MEDICAID

## 2024-07-22 VITALS
OXYGEN SATURATION: 98 % | HEART RATE: 131 BPM | SYSTOLIC BLOOD PRESSURE: 124 MMHG | BODY MASS INDEX: 21.97 KG/M2 | HEIGHT: 67 IN | TEMPERATURE: 98.4 F | DIASTOLIC BLOOD PRESSURE: 76 MMHG | WEIGHT: 140 LBS

## 2024-07-22 DIAGNOSIS — R05.1 ACUTE COUGH: Primary | ICD-10-CM

## 2024-07-22 DIAGNOSIS — J02.9 SORE THROAT: ICD-10-CM

## 2024-07-22 DIAGNOSIS — R06.02 SOB (SHORTNESS OF BREATH): ICD-10-CM

## 2024-07-22 DIAGNOSIS — R05.1 ACUTE COUGH: ICD-10-CM

## 2024-07-22 LAB — POC RAPID STREP: NEGATIVE

## 2024-07-22 PROCEDURE — 99213 OFFICE O/P EST LOW 20 MIN: CPT

## 2024-07-22 PROCEDURE — 71046 X-RAY EXAM CHEST 2 VIEWS: CPT | Performed by: RADIOLOGY

## 2024-07-22 PROCEDURE — 87880 STREP A ASSAY W/OPTIC: CPT

## 2024-07-22 PROCEDURE — 1036F TOBACCO NON-USER: CPT

## 2024-07-22 PROCEDURE — 87635 SARS-COV-2 COVID-19 AMP PRB: CPT

## 2024-07-22 PROCEDURE — 71046 X-RAY EXAM CHEST 2 VIEWS: CPT

## 2024-07-22 PROCEDURE — 3008F BODY MASS INDEX DOCD: CPT

## 2024-07-22 NOTE — LETTER
July 22, 2024     Patient: Tracey Villa   YOB: 2000   Date of Visit: 7/22/2024       To Whom It May Concern:    Tracey Villa was seen in my clinic on 7/22/2024 at 2:00 pm. Please excuse Tracey for her absence from work on this day to make the appointment. Please excuse Tracey from today 7/22/24 until 7/23/24.     If you have any questions or concerns, please don't hesitate to call.         Sincerely,         Rika Young PA-C

## 2024-07-22 NOTE — PROGRESS NOTES
Subjective   Patient ID: Tracey Villa is a 24 y.o. female who presents for Cough (Low grade fever, Rt earache, smell is off), Sore Throat, and Shortness of Breath.  HPI  Tracey presents with her mom for cough, fever, ear pain, smell off   Throat was sore and diarrhea all week last week  Sore throat, more itchy than sore now  Diarrhea has stopped  Also chest pain and SOB - this started today  Hurts when she breathes in and out   Chest feels tight, also feels it in R shoulder blade   Chest pain is right in the center, does not move   Cough started yesterday   Cough is productive, does not see color of mucous   No runny nose but congested   Does have pain behind eyes, has a headache   No known sick exposures but there is strep going around  center since July 11th   This morning temperature of 100.7 F   No tylenol  Freezing and chills today   Body aches   Decreased appetite   Urination is normal   No BM since 2-3 days ago, was soft when she did have   R ear does hurt, no otorrhea   L ear is okay       Past Surgical History:   Procedure Laterality Date    COLPOSCOPY  09/15/24    OTHER SURGICAL HISTORY  02/20/2020    No history of surgery      Past Medical History:   Diagnosis Date    Acute upper respiratory infection, unspecified 02/09/2018    Acute upper respiratory infection    Anemia     Contact with and (suspected) exposure to other viral communicable diseases 12/22/2014    Exposure to influenza    Depression 2021    Pediculosis due to pediculus humanus capitis 12/03/2015    Lice infested hair    Personal history of diseases of the blood and blood-forming organs and certain disorders involving the immune mechanism 08/09/2019    History of iron deficiency anemia    Personal history of other diseases of the respiratory system 12/22/2014    History of pharyngitis    Personal history of other diseases of the respiratory system 02/09/2018    History of sore throat    Personal history of other specified  conditions 12/22/2014    History of fever    Personal history of other specified conditions 11/27/2019    History of epistaxis    Urinary tract infection      Social History     Tobacco Use    Smoking status: Never    Smokeless tobacco: Never   Vaping Use    Vaping status: Never Used   Substance Use Topics    Alcohol use: Not Currently    Drug use: Never        Review of Systems  10 point review of systems performed and is negative except as noted in the HPI.      Current Outpatient Medications:     amitriptyline (Elavil) 10 mg tablet, Take 4 tablets (40 mg) by mouth once daily at bedtime., Disp: , Rfl:     atorvastatin (Lipitor) 80 mg tablet, Take 0.5 tablets (40 mg) by mouth once daily., Disp: 45 tablet, Rfl: 1    cyclobenzaprine (Flexeril) 5 mg tablet, Take 1-2 tablets (5-10 mg) by mouth every 8 hours if needed for muscle spasms., Disp: 90 tablet, Rfl: 2    diazePAM (Valium) 5 mg tablet, Take 1 tablet (5 mg) by mouth as needed at bedtime. Insert one pill vaginally as needed for pain, Disp: , Rfl:     drospirenone, contraceptive, (Slynd) 4 mg (28) tablet, Take 1 tablet by mouth once daily., Disp: 90 tablet, Rfl: 3    Gemtesa 75 mg tablet, Take 1 tablet (75 mg) by mouth once daily., Disp: 90 tablet, Rfl: 3    hydrOXYzine HCL (Atarax) 25 mg tablet, Take 1 tablet (25 mg) by mouth once daily at bedtime., Disp: 30 tablet, Rfl: 8    ibuprofen 600 mg tablet, Take 1 tablet (600 mg) by mouth every 8 hours if needed (pain)., Disp: , Rfl:     loratadine (Claritin) 10 mg tablet, Take 1 tablet (10 mg) by mouth once daily as needed., Disp: , Rfl:     methen-m.blue-s.phos-phsal-hyo (UribeL) 118-10-40.8-36 mg capsule, Take 1 capsule by mouth once daily as needed (bladder spasms, urethral pain)., Disp: 30 capsule, Rfl: 3    omeprazole (PriLOSEC) 40 mg DR capsule, TAKE 1 CAPSULE BY MOUTH ONCE DAILY IN THE MORNING BEFORE MEAL(S), Disp: 90 capsule, Rfl: 0    ondansetron ODT (Zofran-ODT) 4 mg disintegrating tablet, Take 1 tablet (4  "mg) by mouth every 6 hours if needed for nausea., Disp: , Rfl:     SUMAtriptan (Imitrex) 50 mg tablet, Take 1 tablet (50 mg) by mouth 1 time if needed for migraine. May repeat after 2 hours., Disp: 27 tablet, Rfl: 3    tamsulosin (Flomax) 0.4 mg 24 hr capsule, Take 2 capsules (0.8 mg) by mouth once daily at bedtime., Disp: 180 capsule, Rfl: 3    triamcinolone (Kenalog) 0.1 % cream, Apply topically 2 times a day. Apply to affected area 1-2 times daily as needed. Avoid face and groin., Disp: 30 g, Rfl: 0    amitriptyline (Elavil) 25 mg tablet, Take 1 tablet (25 mg) by mouth once daily at bedtime. (Patient not taking: Reported on 7/22/2024), Disp: 90 tablet, Rfl: 3    EPINEPHrine (Epipen) 0.3 mg/0.3 mL injection syringe, Inject 0.3 mL (0.3 mg) into the muscle if needed for anaphylaxis for up to 1 day. Call 911 after use., Disp: 2 each, Rfl: 2    tiZANidine (Zanaflex) 2 mg tablet, Take 1 tablet (2 mg) by mouth every 6 hours if needed for muscle spasms for up to 10 days. (Patient not taking: Reported on 7/19/2024), Disp: 30 tablet, Rfl: 0     Objective   /76   Pulse (!) 131   Temp 36.9 °C (98.4 °F)   Ht 1.708 m (5' 7.25\")   Wt 63.5 kg (140 lb)   LMP 07/01/2024   SpO2 98%   BMI 21.76 kg/m²     Physical Exam  Constitutional:       General: She is not in acute distress.     Appearance: Normal appearance. She is ill-appearing. She is not toxic-appearing.   HENT:      Head: Normocephalic and atraumatic.      Right Ear: Tympanic membrane, ear canal and external ear normal.      Left Ear: Tympanic membrane, ear canal and external ear normal.      Nose: Nose normal. No congestion or rhinorrhea.      Mouth/Throat:      Mouth: Mucous membranes are moist.      Pharynx: Oropharynx is clear. No oropharyngeal exudate or posterior oropharyngeal erythema.   Eyes:      Conjunctiva/sclera: Conjunctivae normal.      Pupils: Pupils are equal, round, and reactive to light.   Cardiovascular:      Rate and Rhythm: Normal rate and " regular rhythm.      Pulses: Normal pulses.      Heart sounds: Normal heart sounds. No murmur heard.  Pulmonary:      Effort: Pulmonary effort is normal.      Breath sounds: Normal breath sounds. No wheezing, rhonchi or rales.   Abdominal:      General: Bowel sounds are normal. There is no distension.      Palpations: Abdomen is soft. There is no mass.      Tenderness: There is generalized abdominal tenderness. There is no guarding or rebound.   Musculoskeletal:         General: Normal range of motion.      Cervical back: Normal range of motion and neck supple.      Right lower leg: No edema.      Left lower leg: No edema.   Lymphadenopathy:      Cervical: No cervical adenopathy.   Skin:     General: Skin is warm and dry.   Neurological:      Mental Status: She is alert and oriented to person, place, and time.   Psychiatric:         Mood and Affect: Mood normal.         Behavior: Behavior normal.         Assessment/Plan   Problem List Items Addressed This Visit    None  Visit Diagnoses       Acute cough    -  Primary    Relevant Orders    XR chest 2 views (Completed)    Sars-CoV-2 PCR    Sore throat        Relevant Orders    POCT Rapid Strep A manually resulted (Completed)    SOB (shortness of breath)        Relevant Orders    XR chest 2 views (Completed)    Sars-CoV-2 PCR          Cough and SOB   Chest xray, r/o pneumonia - wnl  Discussed possible COVID as well given symptoms - will do PCR   Supportive care for now     Rapid strep negative     Discussed at visit any disease processes that were of concern as well as the risks, benefits and instructions on any new medication provided. Patient (and/or caretaker of patient if present) stated all questions were answered, and they voiced understanding of instructions.

## 2024-07-23 DIAGNOSIS — U07.1 COVID: Primary | ICD-10-CM

## 2024-07-23 LAB — SARS-COV-2 RNA RESP QL NAA+PROBE: DETECTED

## 2024-07-26 ENCOUNTER — APPOINTMENT (OUTPATIENT)
Dept: UROLOGY | Facility: CLINIC | Age: 24
End: 2024-07-26
Payer: MEDICAID

## 2024-08-01 ENCOUNTER — APPOINTMENT (OUTPATIENT)
Dept: UROLOGY | Facility: CLINIC | Age: 24
End: 2024-08-01
Payer: MEDICAID

## 2024-08-14 DIAGNOSIS — R39.9 UTI SYMPTOMS: ICD-10-CM

## 2024-08-14 DIAGNOSIS — R35.0 URINARY FREQUENCY: Primary | ICD-10-CM

## 2024-08-20 ENCOUNTER — HOSPITAL ENCOUNTER (EMERGENCY)
Facility: HOSPITAL | Age: 24
Discharge: HOME | End: 2024-08-20
Attending: STUDENT IN AN ORGANIZED HEALTH CARE EDUCATION/TRAINING PROGRAM
Payer: MEDICAID

## 2024-08-20 VITALS
BODY MASS INDEX: 21.97 KG/M2 | WEIGHT: 140 LBS | RESPIRATION RATE: 16 BRPM | DIASTOLIC BLOOD PRESSURE: 83 MMHG | HEIGHT: 67 IN | SYSTOLIC BLOOD PRESSURE: 136 MMHG | OXYGEN SATURATION: 100 % | HEART RATE: 93 BPM | TEMPERATURE: 98.2 F

## 2024-08-20 DIAGNOSIS — R51.9 NONINTRACTABLE HEADACHE, UNSPECIFIED CHRONICITY PATTERN, UNSPECIFIED HEADACHE TYPE: Primary | ICD-10-CM

## 2024-08-20 DIAGNOSIS — G44.201 ACUTE INTRACTABLE TENSION-TYPE HEADACHE: ICD-10-CM

## 2024-08-20 LAB — SARS-COV-2 RNA RESP QL NAA+PROBE: NOT DETECTED

## 2024-08-20 PROCEDURE — 87635 SARS-COV-2 COVID-19 AMP PRB: CPT

## 2024-08-20 PROCEDURE — 96374 THER/PROPH/DIAG INJ IV PUSH: CPT

## 2024-08-20 PROCEDURE — 2500000005 HC RX 250 GENERAL PHARMACY W/O HCPCS

## 2024-08-20 PROCEDURE — 2500000004 HC RX 250 GENERAL PHARMACY W/ HCPCS (ALT 636 FOR OP/ED)

## 2024-08-20 PROCEDURE — 96361 HYDRATE IV INFUSION ADD-ON: CPT

## 2024-08-20 PROCEDURE — 96375 TX/PRO/DX INJ NEW DRUG ADDON: CPT

## 2024-08-20 PROCEDURE — 99284 EMERGENCY DEPT VISIT MOD MDM: CPT | Mod: 25

## 2024-08-20 RX ORDER — LIDOCAINE 560 MG/1
1 PATCH PERCUTANEOUS; TOPICAL; TRANSDERMAL ONCE
Status: DISCONTINUED | OUTPATIENT
Start: 2024-08-20 | End: 2024-08-20 | Stop reason: HOSPADM

## 2024-08-20 RX ORDER — METOCLOPRAMIDE HYDROCHLORIDE 5 MG/ML
10 INJECTION INTRAMUSCULAR; INTRAVENOUS ONCE
Status: COMPLETED | OUTPATIENT
Start: 2024-08-20 | End: 2024-08-20

## 2024-08-20 RX ORDER — KETOROLAC TROMETHAMINE 30 MG/ML
30 INJECTION, SOLUTION INTRAMUSCULAR; INTRAVENOUS ONCE
Status: DISCONTINUED | OUTPATIENT
Start: 2024-08-20 | End: 2024-08-20

## 2024-08-20 RX ORDER — METOCLOPRAMIDE 10 MG/1
10 TABLET ORAL EVERY 12 HOURS PRN
Qty: 28 TABLET | Refills: 0 | Status: SHIPPED | OUTPATIENT
Start: 2024-08-20 | End: 2024-09-03

## 2024-08-20 RX ORDER — SUMATRIPTAN 50 MG/1
50 TABLET, FILM COATED ORAL ONCE AS NEEDED
Qty: 27 TABLET | Refills: 3 | Status: SHIPPED | OUTPATIENT
Start: 2024-08-20 | End: 2025-08-20

## 2024-08-20 RX ORDER — DIPHENHYDRAMINE HYDROCHLORIDE 50 MG/ML
25 INJECTION INTRAMUSCULAR; INTRAVENOUS ONCE
Status: COMPLETED | OUTPATIENT
Start: 2024-08-20 | End: 2024-08-20

## 2024-08-20 RX ORDER — KETOROLAC TROMETHAMINE 15 MG/ML
15 INJECTION, SOLUTION INTRAMUSCULAR; INTRAVENOUS ONCE
Status: COMPLETED | OUTPATIENT
Start: 2024-08-20 | End: 2024-08-20

## 2024-08-20 ASSESSMENT — PAIN DESCRIPTION - ONSET: ONSET: ONGOING

## 2024-08-20 ASSESSMENT — COLUMBIA-SUICIDE SEVERITY RATING SCALE - C-SSRS
6. HAVE YOU EVER DONE ANYTHING, STARTED TO DO ANYTHING, OR PREPARED TO DO ANYTHING TO END YOUR LIFE?: NO
1. IN THE PAST MONTH, HAVE YOU WISHED YOU WERE DEAD OR WISHED YOU COULD GO TO SLEEP AND NOT WAKE UP?: NO
2. HAVE YOU ACTUALLY HAD ANY THOUGHTS OF KILLING YOURSELF?: NO

## 2024-08-20 ASSESSMENT — PAIN DESCRIPTION - FREQUENCY: FREQUENCY: CONSTANT/CONTINUOUS

## 2024-08-20 ASSESSMENT — LIFESTYLE VARIABLES
EVER FELT BAD OR GUILTY ABOUT YOUR DRINKING: NO
HAVE YOU EVER FELT YOU SHOULD CUT DOWN ON YOUR DRINKING: NO
HAVE PEOPLE ANNOYED YOU BY CRITICIZING YOUR DRINKING: NO
TOTAL SCORE: 0
EVER HAD A DRINK FIRST THING IN THE MORNING TO STEADY YOUR NERVES TO GET RID OF A HANGOVER: NO

## 2024-08-20 ASSESSMENT — PAIN SCALES - GENERAL: PAINLEVEL_OUTOF10: 8

## 2024-08-20 ASSESSMENT — PAIN DESCRIPTION - DESCRIPTORS: DESCRIPTORS: ACHING

## 2024-08-20 ASSESSMENT — PAIN DESCRIPTION - LOCATION: LOCATION: HEAD

## 2024-08-20 ASSESSMENT — PAIN - FUNCTIONAL ASSESSMENT: PAIN_FUNCTIONAL_ASSESSMENT: 0-10

## 2024-08-20 ASSESSMENT — PAIN DESCRIPTION - PAIN TYPE: TYPE: OTHER (COMMENT)

## 2024-08-20 ASSESSMENT — PAIN DESCRIPTION - PROGRESSION: CLINICAL_PROGRESSION: NOT CHANGED
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
## 2024-08-20 NOTE — DISCHARGE INSTRUCTIONS
You were seen today due to concerns of persistent headache despite multimodal pain therapy at home.  You should take Tylenol and Motrin alternatingly every 3 hours for symptom control to keep the pain at a lower baseline in addition to your sumatriptan as needed.  Please follow-up with your neurologist in October and follow-up with your primary physician see if there is any changes that can be made to your medications    --   Neurology Clinic  [facility]  Phone: [phone]

## 2024-08-23 ENCOUNTER — OFFICE VISIT (OUTPATIENT)
Dept: PRIMARY CARE | Facility: CLINIC | Age: 24
End: 2024-08-23
Payer: MEDICAID

## 2024-08-23 VITALS
DIASTOLIC BLOOD PRESSURE: 90 MMHG | SYSTOLIC BLOOD PRESSURE: 134 MMHG | HEART RATE: 91 BPM | WEIGHT: 142 LBS | BODY MASS INDEX: 22.24 KG/M2 | OXYGEN SATURATION: 98 %

## 2024-08-23 DIAGNOSIS — G43.E11 INTRACTABLE CHRONIC MIGRAINE WITH AURA WITH STATUS MIGRAINOSUS: Primary | ICD-10-CM

## 2024-08-23 RX ORDER — PROPRANOLOL HYDROCHLORIDE 20 MG/1
20 TABLET ORAL 2 TIMES DAILY
Qty: 60 TABLET | Refills: 5 | Status: SHIPPED | OUTPATIENT
Start: 2024-08-23 | End: 2025-02-19

## 2024-08-23 RX ORDER — ONDANSETRON 4 MG/1
4 TABLET, ORALLY DISINTEGRATING ORAL EVERY 6 HOURS PRN
Qty: 20 TABLET | Refills: 11 | Status: SHIPPED | OUTPATIENT
Start: 2024-08-23

## 2024-08-23 RX ORDER — METHYLPREDNISOLONE 4 MG/1
TABLET ORAL
Qty: 21 TABLET | Refills: 0 | Status: SHIPPED | OUTPATIENT
Start: 2024-08-23 | End: 2024-08-30

## 2024-08-23 ASSESSMENT — VISUAL ACUITY: VA_NORMAL: 1
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
## 2024-08-26 ENCOUNTER — APPOINTMENT (OUTPATIENT)
Dept: UROLOGY | Facility: CLINIC | Age: 24
End: 2024-08-26
Payer: MEDICAID

## 2024-08-28 ENCOUNTER — APPOINTMENT (OUTPATIENT)
Dept: RADIOLOGY | Facility: HOSPITAL | Age: 24
End: 2024-08-28
Payer: MEDICAID

## 2024-09-17 ENCOUNTER — OFFICE VISIT (OUTPATIENT)
Dept: PRIMARY CARE | Facility: CLINIC | Age: 24
End: 2024-09-17
Payer: MEDICAID

## 2024-09-17 ENCOUNTER — APPOINTMENT (OUTPATIENT)
Dept: PRIMARY CARE | Facility: CLINIC | Age: 24
End: 2024-09-17
Payer: MEDICAID

## 2024-09-17 VITALS
DIASTOLIC BLOOD PRESSURE: 68 MMHG | SYSTOLIC BLOOD PRESSURE: 102 MMHG | BODY MASS INDEX: 22.76 KG/M2 | HEIGHT: 67 IN | WEIGHT: 145 LBS | HEART RATE: 68 BPM | OXYGEN SATURATION: 98 %

## 2024-09-17 DIAGNOSIS — R51.9 CHRONIC NONINTRACTABLE HEADACHE, UNSPECIFIED HEADACHE TYPE: Primary | ICD-10-CM

## 2024-09-17 DIAGNOSIS — G89.29 CHRONIC NONINTRACTABLE HEADACHE, UNSPECIFIED HEADACHE TYPE: Primary | ICD-10-CM

## 2024-09-17 PROCEDURE — 99213 OFFICE O/P EST LOW 20 MIN: CPT

## 2024-09-17 PROCEDURE — 3008F BODY MASS INDEX DOCD: CPT

## 2024-09-17 PROCEDURE — 1036F TOBACCO NON-USER: CPT

## 2024-09-17 RX ORDER — ATOGEPANT 60 MG/1
60 TABLET ORAL DAILY
Qty: 30 TABLET | Refills: 11 | Status: SHIPPED | OUTPATIENT
Start: 2024-09-17

## 2024-09-17 ASSESSMENT — PATIENT HEALTH QUESTIONNAIRE - PHQ9
2. FEELING DOWN, DEPRESSED OR HOPELESS: NOT AT ALL
1. LITTLE INTEREST OR PLEASURE IN DOING THINGS: NOT AT ALL
SUM OF ALL RESPONSES TO PHQ9 QUESTIONS 1 AND 2: 0

## 2024-09-17 NOTE — LETTER
September 17, 2024     Patient: Tracey Villa   YOB: 2000   Date of Visit: 9/17/2024       To Whom It May Concern:    Tracey Villa was seen in my clinic on 9/17/2024 at 11:00 am. Please excuse her from class to make this appointment. She has been under my care for migraine headaches. Treatments are being adjusted and we are following her progress closely. I agree with accommodations needed to help her succeed at school and work.    If you have any questions or concerns, please don't hesitate to call.         Sincerely,         Bibiana Silva PA-C        CC: No Recipients
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
## 2024-09-17 NOTE — PATIENT INSTRUCTIONS
I am sorry you have no had much success in breaking the migraines yet.   Stay on propranolol since it is helping your POTS.   Lets try to get Ubrelvy approved for rescue- samples given- take one at onset of migraine. Can take another 2 hours later if migraine is still present.   Start qulipta once daily for migraine prevention- will try to get this approved as well.     Suggestions for preventing or controlling migraines:  - Riboflavin (vitamin B2) 200 mg twice a day and magnesium oxide 400-600 mg daily for prevention.   - CoQ10 100 mg daily increasing to 400 mg daily may also be helpful  - avoid triggers that can cause or worsen migraines (food, lack of sleep, stress, etc.)  - keep a diary of your headaches to note how often you get them, how long they last, and any other helpful information   - avoid taking abortive medication (NOT preventative medication) more than 3 days a week (includes both prescription and over the counter meds)  - take your preventative medication as directed. Let me know if you have side effects or problems with the medication. Do not suddenly stop the medication.  - melatonin 3 mg at night can help with sleep and headaches.

## 2024-09-25 DIAGNOSIS — R51.9 CHRONIC NONINTRACTABLE HEADACHE, UNSPECIFIED HEADACHE TYPE: Primary | ICD-10-CM

## 2024-09-25 DIAGNOSIS — G89.29 CHRONIC NONINTRACTABLE HEADACHE, UNSPECIFIED HEADACHE TYPE: Primary | ICD-10-CM

## 2024-09-25 DIAGNOSIS — G43.809 OTHER MIGRAINE WITHOUT STATUS MIGRAINOSUS, NOT INTRACTABLE: Primary | ICD-10-CM

## 2024-09-25 RX ORDER — FREMANEZUMAB-VFRM 225 MG/1.5ML
225 INJECTION SUBCUTANEOUS
Qty: 1 EACH | Refills: 11 | Status: SHIPPED | OUTPATIENT
Start: 2024-09-25

## 2024-09-28 ENCOUNTER — SPECIALTY PHARMACY (OUTPATIENT)
Dept: PHARMACY | Facility: CLINIC | Age: 24
End: 2024-09-28

## 2024-09-28 DIAGNOSIS — K21.9 CHRONIC GERD: ICD-10-CM

## 2024-09-30 RX ORDER — OMEPRAZOLE 40 MG/1
40 CAPSULE, DELAYED RELEASE ORAL
Qty: 90 CAPSULE | Refills: 0 | Status: SHIPPED | OUTPATIENT
Start: 2024-09-30

## 2024-10-01 DIAGNOSIS — M54.2 NECK PAIN: Primary | ICD-10-CM

## 2024-10-07 ENCOUNTER — DOCUMENTATION (OUTPATIENT)
Dept: PRIMARY CARE | Facility: CLINIC | Age: 24
End: 2024-10-07
Payer: MEDICAID

## 2024-10-08 DIAGNOSIS — G43.E11 INTRACTABLE CHRONIC MIGRAINE WITH AURA WITH STATUS MIGRAINOSUS: ICD-10-CM

## 2024-10-08 DIAGNOSIS — R51.9 CHRONIC NONINTRACTABLE HEADACHE, UNSPECIFIED HEADACHE TYPE: Primary | ICD-10-CM

## 2024-10-08 DIAGNOSIS — G89.29 CHRONIC NONINTRACTABLE HEADACHE, UNSPECIFIED HEADACHE TYPE: Primary | ICD-10-CM

## 2024-10-09 RX ORDER — RIZATRIPTAN BENZOATE 10 MG/1
10 TABLET, ORALLY DISINTEGRATING ORAL ONCE AS NEEDED
Qty: 9 TABLET | Refills: 0 | Status: SHIPPED | OUTPATIENT
Start: 2024-10-09 | End: 2024-11-08

## 2024-10-09 RX ORDER — DULOXETIN HYDROCHLORIDE 30 MG/1
30 CAPSULE, DELAYED RELEASE ORAL 2 TIMES DAILY
Qty: 60 CAPSULE | Refills: 11 | Status: SHIPPED | OUTPATIENT
Start: 2024-10-09 | End: 2025-10-09

## 2024-10-09 RX ORDER — AMITRIPTYLINE HYDROCHLORIDE 10 MG/1
40 TABLET, FILM COATED ORAL NIGHTLY
Qty: 360 TABLET | Refills: 2 | Status: SHIPPED | OUTPATIENT
Start: 2024-10-09

## 2024-10-09 NOTE — PROGRESS NOTES
Orders only.   Mistakenly discontinued amitriptyline. Reordering. Typically filled by urology and will defer future dispensing to them.

## 2024-10-14 ENCOUNTER — DOCUMENTATION (OUTPATIENT)
Dept: PRIMARY CARE | Facility: CLINIC | Age: 24
End: 2024-10-14
Payer: MEDICAID

## 2024-10-14 DIAGNOSIS — G43.809 OTHER MIGRAINE WITHOUT STATUS MIGRAINOSUS, NOT INTRACTABLE: Primary | ICD-10-CM

## 2024-10-14 RX ORDER — FREMANEZUMAB-VFRM 225 MG/1.5ML
225 INJECTION SUBCUTANEOUS
Qty: 1 EACH | Refills: 11 | Status: SHIPPED | OUTPATIENT
Start: 2024-10-14

## 2024-10-14 NOTE — PROGRESS NOTES
Pt did not tolerate cymbalta.   She has now tried amitriptyline, propranolol, and duloxetine for prevention.   Will resubmit Stanley for migraine prevention.

## 2024-10-15 ENCOUNTER — DOCUMENTATION (OUTPATIENT)
Dept: PRIMARY CARE | Facility: CLINIC | Age: 24
End: 2024-10-15
Payer: MEDICAID

## 2024-10-15 DIAGNOSIS — H93.19 TINNITUS, UNSPECIFIED LATERALITY: Primary | ICD-10-CM

## 2024-10-17 ENCOUNTER — APPOINTMENT (OUTPATIENT)
Dept: UROLOGY | Facility: CLINIC | Age: 24
End: 2024-10-17
Payer: MEDICAID

## 2024-10-17 VITALS
WEIGHT: 145 LBS | DIASTOLIC BLOOD PRESSURE: 80 MMHG | SYSTOLIC BLOOD PRESSURE: 114 MMHG | TEMPERATURE: 98 F | BODY MASS INDEX: 22.76 KG/M2 | HEART RATE: 99 BPM | HEIGHT: 67 IN

## 2024-10-17 DIAGNOSIS — N76.0 ACUTE VAGINITIS: ICD-10-CM

## 2024-10-17 DIAGNOSIS — R10.2 PELVIC PAIN: ICD-10-CM

## 2024-10-17 DIAGNOSIS — N30.10 IC (INTERSTITIAL CYSTITIS): Primary | ICD-10-CM

## 2024-10-17 DIAGNOSIS — N94.2 VAGINISMUS: ICD-10-CM

## 2024-10-17 DIAGNOSIS — M62.89 HIGH-TONE PELVIC FLOOR DYSFUNCTION: ICD-10-CM

## 2024-10-17 DIAGNOSIS — N32.89 BLADDER SPASM: ICD-10-CM

## 2024-10-17 LAB
POC APPEARANCE, URINE: CLEAR
POC BILIRUBIN, URINE: NEGATIVE
POC BLOOD, URINE: NEGATIVE
POC COLOR, URINE: YELLOW
POC GLUCOSE, URINE: NEGATIVE MG/DL
POC KETONES, URINE: NEGATIVE MG/DL
POC LEUKOCYTES, URINE: NEGATIVE
POC NITRITE,URINE: NEGATIVE
POC PH, URINE: 7 PH
POC PROTEIN, URINE: NEGATIVE MG/DL
POC SPECIFIC GRAVITY, URINE: 1.02
POC UROBILINOGEN, URINE: 0.2 EU/DL

## 2024-10-17 PROCEDURE — 87205 SMEAR GRAM STAIN: CPT

## 2024-10-17 PROCEDURE — 99213 OFFICE O/P EST LOW 20 MIN: CPT | Performed by: NURSE PRACTITIONER

## 2024-10-17 PROCEDURE — 3008F BODY MASS INDEX DOCD: CPT | Performed by: NURSE PRACTITIONER

## 2024-10-17 PROCEDURE — 1036F TOBACCO NON-USER: CPT | Performed by: NURSE PRACTITIONER

## 2024-10-17 PROCEDURE — 51700 IRRIGATION OF BLADDER: CPT | Performed by: NURSE PRACTITIONER

## 2024-10-17 PROCEDURE — 81003 URINALYSIS AUTO W/O SCOPE: CPT | Performed by: NURSE PRACTITIONER

## 2024-10-17 RX ORDER — INDOMETHACIN 25 MG/1
5 CAPSULE ORAL ONCE
Status: COMPLETED | OUTPATIENT
Start: 2024-10-17 | End: 2024-10-17

## 2024-10-17 RX ORDER — GENTAMICIN 40 MG/ML
80 INJECTION, SOLUTION INTRAMUSCULAR; INTRAVENOUS ONCE
Status: COMPLETED | OUTPATIENT
Start: 2024-10-17 | End: 2024-10-17

## 2024-10-17 RX ORDER — TRIAMCINOLONE ACETONIDE 40 MG/ML
40 INJECTION, SUSPENSION INTRA-ARTICULAR; INTRAMUSCULAR ONCE
Status: COMPLETED | OUTPATIENT
Start: 2024-10-17 | End: 2024-10-17

## 2024-10-17 RX ORDER — LIDOCAINE HYDROCHLORIDE 10 MG/ML
20 INJECTION, SOLUTION INFILTRATION; PERINEURAL ONCE
Status: COMPLETED | OUTPATIENT
Start: 2024-10-17 | End: 2024-10-17

## 2024-10-17 ASSESSMENT — PAIN SCALES - GENERAL: PAINLEVEL_OUTOF10: 0 - NO PAIN

## 2024-10-17 NOTE — PATIENT INSTRUCTIONS
Pelvic floor physical therapy  Uribel if needed  Bladder instillation monthly  Plans to contact Dr. Hernandez about another hydrodistention  Has been manageable, 60-70% better until recent flare up  Left pelvic and back pain, will start w PFPT,if no better can put in TAUS  Nurse line [phone]

## 2024-10-17 NOTE — PROGRESS NOTES
"10/17/24   [id number]    Bladder instillation, left lower abdomen/back discomfort     Subjective      HPI Tracey Villa is a 24 y.o. female who presents for bladder instillation, having flare up and left lower pelvic/back pain. Has order for ultrasound from gyn, left lower pelvic and back pain;plans to get back in PFPT as we dicsussed seems back pain associated w left pelvic pain;     Bladder instillation today, feels this treatment helps w flare ups,doing ok until this past Tuesday;     On 0.8 mg tamsulosin and amitriptyline 40 mg, Gemtesa 75 mg, tilt table + POTS; feels tamsulosin works really well; otherwise urination difficult; no dizziness from tamsulosin;     Having some vaginal discharge, requesting culture sent;    Last seen 7/19/24    Continue gemtesa, discussed prelief;    PMH, PSH, FH, SH reviewed    Mom w patient    Objective     /80   Pulse 99   Temp 36.7 °C (98 °F)   Ht 1.702 m (5' 7\")   Wt 65.8 kg (145 lb)   BMI 22.71 kg/m²    Physical Exam  General: Appears comfortable and in no apparent distress, well nourished  Head: Normocephalic, atraumatic  Neck: trachea midline  Respiratory: respirations unlabored, no wheezes, and no use of accessory muscles  Cardiovascular: at rest no dyspnea, well perfused  Skin: no visible rashes or lesions  Neurologic: grossly intact, oriented to person, place, and time  Psychiatric: mood and affect appropriate  Musculoskeletal: in chair for appt. no difficulty w upper body movement     able to tolerate half speculum much with bladder instillation; doing well; vaginal culture sent of yellow discharge;     Assessment/Plan   Problem List Items Addressed This Visit          Genitourinary and Reproductive    Bladder spasm    Relevant Medications    triamcinolone acetonide (Kenalog-40) injection 40 mg (Completed) (Start on 10/17/2024 11:00 AM)    gentamicin (Garamycin) injection 80 mg (Completed) (Start on 10/17/2024 11:00 AM)    sodium bicarbonate 1 mEq/mL " (8.4 %) injection 5 mEq (Completed) (Start on 10/17/2024 11:00 AM)    lidocaine (Xylocaine) 10 mg/mL (1 %) injection 20 mL (Completed) (Start on 10/17/2024 11:00 AM)    Other Relevant Orders    Referral to Physical Therapy    Vaginismus    Relevant Medications    triamcinolone acetonide (Kenalog-40) injection 40 mg (Completed) (Start on 10/17/2024 11:00 AM)    gentamicin (Garamycin) injection 80 mg (Completed) (Start on 10/17/2024 11:00 AM)    sodium bicarbonate 1 mEq/mL (8.4 %) injection 5 mEq (Completed) (Start on 10/17/2024 11:00 AM)    lidocaine (Xylocaine) 10 mg/mL (1 %) injection 20 mL (Completed) (Start on 10/17/2024 11:00 AM)    Other Relevant Orders    Referral to Physical Therapy     Other Visit Diagnoses       IC (interstitial cystitis)    -  Primary    Relevant Medications    triamcinolone acetonide (Kenalog-40) injection 40 mg (Completed) (Start on 10/17/2024 11:00 AM)    gentamicin (Garamycin) injection 80 mg (Completed) (Start on 10/17/2024 11:00 AM)    sodium bicarbonate 1 mEq/mL (8.4 %) injection 5 mEq (Completed) (Start on 10/17/2024 11:00 AM)    lidocaine (Xylocaine) 10 mg/mL (1 %) injection 20 mL (Completed) (Start on 10/17/2024 11:00 AM)    Other Relevant Orders    POCT UA Automated manually resulted (Completed)    Referral to Physical Therapy    Pelvic pain        Relevant Medications    triamcinolone acetonide (Kenalog-40) injection 40 mg (Completed) (Start on 10/17/2024 11:00 AM)    gentamicin (Garamycin) injection 80 mg (Completed) (Start on 10/17/2024 11:00 AM)    sodium bicarbonate 1 mEq/mL (8.4 %) injection 5 mEq (Completed) (Start on 10/17/2024 11:00 AM)    lidocaine (Xylocaine) 10 mg/mL (1 %) injection 20 mL (Completed) (Start on 10/17/2024 11:00 AM)    Other Relevant Orders    Referral to Physical Therapy    High-tone pelvic floor dysfunction        Relevant Medications    triamcinolone acetonide (Kenalog-40) injection 40 mg (Completed) (Start on 10/17/2024 11:00 AM)    gentamicin  (Garamycin) injection 80 mg (Completed) (Start on 10/17/2024 11:00 AM)    sodium bicarbonate 1 mEq/mL (8.4 %) injection 5 mEq (Completed) (Start on 10/17/2024 11:00 AM)    lidocaine (Xylocaine) 10 mg/mL (1 %) injection 20 mL (Completed) (Start on 10/17/2024 11:00 AM)    Other Relevant Orders    Referral to Physical Therapy    Acute vaginitis                Orders Placed This Encounter   Procedures    Bladder Catheterization     This order was created via procedure documentation    Referral to Physical Therapy     Standing Status:   Future     Standing Expiration Date:   10/17/2025     Referral Priority:   Routine     Referral Type:   Consultation     Referral Reason:   Specialty Services Required     Requested Specialty:   Physical Therapy     Number of Visits Requested:   1    POCT UA Automated manually resulted     Order Specific Question:   Release result to [facility]     Answer:   Immediate [1]           Plan:  Pelvic floor physical therapy  Uribel if needed  Bladder instillation monthly  Plans to contact Dr. Hernandez about another hydrodistention  Has been manageable, 60-70% better until recent flare up  Left pelvic and back pain, will start w PFPT,discussed if no better can put in TAUS, but patient already has order for ultrasound from GYN    Vaginal culture sent  Nurse line [phone]        Patient ID: Tracey Villa is a 24 y.o. female.    Bladder Catheterization    Date/Time: 10/17/2024 9:41 AM    Performed by: SANTOS Regalado  Authorized by: SANTOS Regalado    Procedure Details    Procedure: bladder irrigation      Catheter size: 14 Fr    Complexity: simple      Urine characteristics: clear    Post-Procedure Details     Outcome: patient tolerated procedure well with no complications      Additional Details      IC cocktail lidocaine, trimacinolone, gentamicin, bicarb          Karlee L Dasilva, APRN-CNP  Lab Results   Component Value Date    GLUCOSE 78 04/05/2024    CALCIUM 9.6 04/05/2024      04/05/2024    K 4.3 04/05/2024    CO2 26 04/05/2024     04/05/2024    BUN 19 04/05/2024    CREATININE 0.89 04/05/2024     Prelief [employer]  Tamsulosin, can take two for a bit then go back to one  Gemtesa daily  Amitriptyline has increased to 40 mg  Has appt. For next instillation in August  Nurse line [phone]

## 2024-10-18 ENCOUNTER — TELEPHONE (OUTPATIENT)
Dept: PRIMARY CARE | Facility: CLINIC | Age: 24
End: 2024-10-18
Payer: MEDICAID

## 2024-10-18 LAB
CLUE CELLS VAG LPF-#/AREA: NORMAL /[LPF]
NUGENT SCORE: 1
YEAST VAG WET PREP-#/AREA: NORMAL

## 2024-10-29 ENCOUNTER — TELEPHONE (OUTPATIENT)
Dept: NEUROLOGY | Facility: HOSPITAL | Age: 24
End: 2024-10-29

## 2024-10-29 ENCOUNTER — OFFICE VISIT (OUTPATIENT)
Dept: NEUROLOGY | Facility: HOSPITAL | Age: 24
End: 2024-10-29
Payer: MEDICAID

## 2024-10-29 ENCOUNTER — APPOINTMENT (OUTPATIENT)
Dept: PRIMARY CARE | Facility: CLINIC | Age: 24
End: 2024-10-29
Payer: MEDICAID

## 2024-10-29 VITALS
TEMPERATURE: 97.5 F | HEART RATE: 81 BPM | HEIGHT: 67 IN | OXYGEN SATURATION: 99 % | WEIGHT: 149 LBS | BODY MASS INDEX: 23.39 KG/M2 | DIASTOLIC BLOOD PRESSURE: 60 MMHG | SYSTOLIC BLOOD PRESSURE: 122 MMHG

## 2024-10-29 VITALS
SYSTOLIC BLOOD PRESSURE: 124 MMHG | HEART RATE: 112 BPM | WEIGHT: 149.4 LBS | BODY MASS INDEX: 23.4 KG/M2 | DIASTOLIC BLOOD PRESSURE: 76 MMHG

## 2024-10-29 DIAGNOSIS — G43.E09 CHRONIC MIGRAINE WITH AURA WITHOUT STATUS MIGRAINOSUS, NOT INTRACTABLE: Primary | ICD-10-CM

## 2024-10-29 DIAGNOSIS — J32.9 SINUSITIS, UNSPECIFIED CHRONICITY, UNSPECIFIED LOCATION: ICD-10-CM

## 2024-10-29 DIAGNOSIS — G43.E11 INTRACTABLE CHRONIC MIGRAINE WITH AURA WITH STATUS MIGRAINOSUS: Primary | ICD-10-CM

## 2024-10-29 DIAGNOSIS — Z79.899 POLYPHARMACY: ICD-10-CM

## 2024-10-29 DIAGNOSIS — G43.E11 INTRACTABLE CHRONIC MIGRAINE WITH AURA WITH STATUS MIGRAINOSUS: ICD-10-CM

## 2024-10-29 DIAGNOSIS — G44.321 INTRACTABLE CHRONIC POST-TRAUMATIC HEADACHE: ICD-10-CM

## 2024-10-29 PROCEDURE — 99213 OFFICE O/P EST LOW 20 MIN: CPT

## 2024-10-29 PROCEDURE — 1036F TOBACCO NON-USER: CPT

## 2024-10-29 PROCEDURE — 3008F BODY MASS INDEX DOCD: CPT

## 2024-10-29 PROCEDURE — 99215 OFFICE O/P EST HI 40 MIN: CPT | Performed by: PSYCHIATRY & NEUROLOGY

## 2024-10-29 RX ORDER — RIZATRIPTAN BENZOATE 10 MG/1
10 TABLET ORAL ONCE AS NEEDED
Qty: 9 TABLET | Refills: 1 | Status: SHIPPED | OUTPATIENT
Start: 2024-10-29 | End: 2024-10-29 | Stop reason: SDUPTHER

## 2024-10-29 RX ORDER — GUAIFENESIN 600 MG/1
1200 TABLET, EXTENDED RELEASE ORAL 2 TIMES DAILY
Qty: 120 TABLET | Refills: 11 | Status: SHIPPED | OUTPATIENT
Start: 2024-10-29 | End: 2024-10-29 | Stop reason: WASHOUT

## 2024-10-29 RX ORDER — GABAPENTIN 100 MG/1
100 CAPSULE ORAL 3 TIMES DAILY
Qty: 90 CAPSULE | Refills: 11 | Status: SHIPPED | OUTPATIENT
Start: 2024-10-29 | End: 2025-10-29

## 2024-10-29 RX ORDER — RIZATRIPTAN BENZOATE 5 MG/1
5 TABLET ORAL ONCE AS NEEDED
Qty: 9 TABLET | Refills: 1 | Status: SHIPPED | OUTPATIENT
Start: 2024-10-29 | End: 2024-12-28

## 2024-10-29 ASSESSMENT — ENCOUNTER SYMPTOMS
LOSS OF SENSATION IN FEET: 1
DEPRESSION: 1
OCCASIONAL FEELINGS OF UNSTEADINESS: 1

## 2024-10-29 ASSESSMENT — PATIENT HEALTH QUESTIONNAIRE - PHQ9
SUM OF ALL RESPONSES TO PHQ QUESTIONS 1-9: 19
10. IF YOU CHECKED OFF ANY PROBLEMS, HOW DIFFICULT HAVE THESE PROBLEMS MADE IT FOR YOU TO DO YOUR WORK, TAKE CARE OF THINGS AT HOME, OR GET ALONG WITH OTHER PEOPLE: NOT DIFFICULT AT ALL
2. FEELING DOWN, DEPRESSED OR HOPELESS: NEARLY EVERY DAY
6. FEELING BAD ABOUT YOURSELF - OR THAT YOU ARE A FAILURE OR HAVE LET YOURSELF OR YOUR FAMILY DOWN: SEVERAL DAYS
5. POOR APPETITE OR OVEREATING: NOT AT ALL
1. LITTLE INTEREST OR PLEASURE IN DOING THINGS: NEARLY EVERY DAY
7. TROUBLE CONCENTRATING ON THINGS, SUCH AS READING THE NEWSPAPER OR WATCHING TELEVISION: NEARLY EVERY DAY
8. MOVING OR SPEAKING SO SLOWLY THAT OTHER PEOPLE COULD HAVE NOTICED. OR THE OPPOSITE, BEING SO FIGETY OR RESTLESS THAT YOU HAVE BEEN MOVING AROUND A LOT MORE THAN USUAL: NEARLY EVERY DAY
SUM OF ALL RESPONSES TO PHQ9 QUESTIONS 1 AND 2: 6
4. FEELING TIRED OR HAVING LITTLE ENERGY: NEARLY EVERY DAY
9. THOUGHTS THAT YOU WOULD BE BETTER OFF DEAD, OR OF HURTING YOURSELF: NOT AT ALL
3. TROUBLE FALLING OR STAYING ASLEEP OR SLEEPING TOO MUCH: NEARLY EVERY DAY

## 2024-10-29 ASSESSMENT — COLUMBIA-SUICIDE SEVERITY RATING SCALE - C-SSRS
6. HAVE YOU EVER DONE ANYTHING, STARTED TO DO ANYTHING, OR PREPARED TO DO ANYTHING TO END YOUR LIFE?: NO
2. HAVE YOU ACTUALLY HAD ANY THOUGHTS OF KILLING YOURSELF?: NO
1. IN THE PAST MONTH, HAVE YOU WISHED YOU WERE DEAD OR WISHED YOU COULD GO TO SLEEP AND NOT WAKE UP?: NO

## 2024-10-29 ASSESSMENT — PAIN SCALES - GENERAL: PAINLEVEL_OUTOF10: 2

## 2024-10-30 PROBLEM — G44.321 INTRACTABLE CHRONIC POST-TRAUMATIC HEADACHE: Status: ACTIVE | Noted: 2024-10-30

## 2024-10-30 PROBLEM — Z79.899 POLYPHARMACY: Status: ACTIVE | Noted: 2024-10-30

## 2024-10-31 DIAGNOSIS — N30.10 INTERSTITIAL CYSTITIS: ICD-10-CM

## 2024-10-31 DIAGNOSIS — J32.9 SINUSITIS, UNSPECIFIED CHRONICITY, UNSPECIFIED LOCATION: Primary | ICD-10-CM

## 2024-10-31 RX ORDER — AZITHROMYCIN 250 MG/1
TABLET, FILM COATED ORAL
Qty: 6 TABLET | Refills: 0 | Status: SHIPPED | OUTPATIENT
Start: 2024-10-31 | End: 2024-11-05

## 2024-10-31 RX ORDER — HYDROXYZINE HYDROCHLORIDE 25 MG/1
25 TABLET, FILM COATED ORAL NIGHTLY
Qty: 30 TABLET | Refills: 0 | Status: SHIPPED | OUTPATIENT
Start: 2024-10-31

## 2024-11-14 ENCOUNTER — APPOINTMENT (OUTPATIENT)
Dept: UROLOGY | Facility: CLINIC | Age: 24
End: 2024-11-14
Payer: MEDICAID

## 2024-11-14 VITALS
WEIGHT: 148 LBS | TEMPERATURE: 98 F | HEART RATE: 82 BPM | SYSTOLIC BLOOD PRESSURE: 107 MMHG | HEIGHT: 67 IN | DIASTOLIC BLOOD PRESSURE: 71 MMHG | BODY MASS INDEX: 23.23 KG/M2

## 2024-11-14 DIAGNOSIS — N30.10 IC (INTERSTITIAL CYSTITIS): ICD-10-CM

## 2024-11-14 DIAGNOSIS — N76.0 ACUTE VAGINITIS: Primary | ICD-10-CM

## 2024-11-14 DIAGNOSIS — R35.0 URINARY FREQUENCY: ICD-10-CM

## 2024-11-14 DIAGNOSIS — N32.89 BLADDER SPASM: ICD-10-CM

## 2024-11-14 PROCEDURE — 99212 OFFICE O/P EST SF 10 MIN: CPT | Performed by: NURSE PRACTITIONER

## 2024-11-14 PROCEDURE — 87205 SMEAR GRAM STAIN: CPT

## 2024-11-14 PROCEDURE — 1036F TOBACCO NON-USER: CPT | Performed by: NURSE PRACTITIONER

## 2024-11-14 PROCEDURE — 3008F BODY MASS INDEX DOCD: CPT | Performed by: NURSE PRACTITIONER

## 2024-11-14 PROCEDURE — 51700 IRRIGATION OF BLADDER: CPT | Performed by: NURSE PRACTITIONER

## 2024-11-14 PROCEDURE — 81003 URINALYSIS AUTO W/O SCOPE: CPT | Performed by: NURSE PRACTITIONER

## 2024-11-14 RX ORDER — GENTAMICIN 40 MG/ML
80 INJECTION, SOLUTION INTRAMUSCULAR; INTRAVENOUS ONCE
Status: COMPLETED | OUTPATIENT
Start: 2024-11-14 | End: 2024-11-14

## 2024-11-14 RX ORDER — LIDOCAINE HYDROCHLORIDE 10 MG/ML
20 INJECTION, SOLUTION INFILTRATION; PERINEURAL ONCE
Status: COMPLETED | OUTPATIENT
Start: 2024-11-14 | End: 2024-11-14

## 2024-11-14 RX ORDER — LIDOCAINE HYDROCHLORIDE 20 MG/ML
10 INJECTION, SOLUTION INFILTRATION; PERINEURAL ONCE
Status: DISCONTINUED | OUTPATIENT
Start: 2024-11-14 | End: 2024-11-14

## 2024-11-14 RX ORDER — INDOMETHACIN 25 MG/1
5 CAPSULE ORAL ONCE
Status: COMPLETED | OUTPATIENT
Start: 2024-11-14 | End: 2024-11-14

## 2024-11-14 RX ORDER — TRIAMCINOLONE ACETONIDE 40 MG/ML
40 INJECTION, SUSPENSION INTRA-ARTICULAR; INTRAMUSCULAR ONCE
Status: COMPLETED | OUTPATIENT
Start: 2024-11-14 | End: 2024-11-14

## 2024-11-14 ASSESSMENT — PAIN SCALES - GENERAL: PAINLEVEL_OUTOF10: 0 - NO PAIN

## 2024-11-14 NOTE — LETTER
November 14, 2024     Patient: Tracey Villa   YOB: 2000   Date of Visit: 11/14/2024       To Whom It May Concern:    It is my medical opinion that Tracey Villa {Work release (duty restriction):31667}.    If you have any questions or concerns, please don't hesitate to call.         Sincerely,        Karlee Dasilva, TEODORO-CNP    CC: No Recipients

## 2024-11-14 NOTE — PROGRESS NOTES
"11/14/24   [id number]    Bladder instillation     Subjective      HPI Tracey Villa is a 24 y.o. female who presents for bladder instillation;     Bladder instillation today, feels this treatment helps w flare ups, going thru finals soon at college, then 3 week intensive;     Recap from previous visit  On 0.8 mg tamsulosin and amitriptyline 40 mg, Gemtesa 75 mg, tilt table + POTS; feels tamsulosin works really well; otherwise urination difficult; no dizziness from tamsulosin;     Some itching, vaginal culture sent    PMH, PSH, FH, SH reviewed    Mom w patient    Objective     /71   Pulse 82   Temp 36.7 °C (98 °F)   Ht 1.702 m (5' 7\")   Wt 67.1 kg (148 lb)   BMI 23.18 kg/m²    Physical Exam  General: Appears comfortable and in no apparent distress, well nourished  Head: Normocephalic, atraumatic  Neck: trachea midline  Respiratory: respirations unlabored, no wheezes, and no use of accessory muscles  Cardiovascular: at rest no dyspnea, well perfused  Skin: no visible rashes or lesions  Neurologic: grossly intact, oriented to person, place, and time  Psychiatric: mood and affect appropriate  Musculoskeletal: in chair for appt. no difficulty w upper body movement     able to tolerate half speculum much with bladder instillation; doing well;     Assessment/Plan   Problem List Items Addressed This Visit          Genitourinary and Reproductive    Bladder spasm    Relevant Medications    triamcinolone acetonide (Kenalog-40) injection 40 mg (Completed) (Start on 11/14/2024 11:30 AM)    gentamicin (Garamycin) injection 80 mg (Completed) (Start on 11/14/2024 11:30 AM)    sodium bicarbonate 1 mEq/mL (8.4 %) injection 5 mEq (Completed) (Start on 11/14/2024 11:30 AM)    lidocaine (Xylocaine) 10 mg/mL (1 %) injection 20 mL (Completed) (Start on 11/14/2024 11:30 AM)     Other Visit Diagnoses       IC (interstitial cystitis)    -  Primary    Relevant Medications    triamcinolone acetonide (Kenalog-40) injection " 40 mg (Completed) (Start on 11/14/2024 11:30 AM)    gentamicin (Garamycin) injection 80 mg (Completed) (Start on 11/14/2024 11:30 AM)    sodium bicarbonate 1 mEq/mL (8.4 %) injection 5 mEq (Completed) (Start on 11/14/2024 11:30 AM)    lidocaine (Xylocaine) 10 mg/mL (1 %) injection 20 mL (Completed) (Start on 11/14/2024 11:30 AM)    Other Relevant Orders    POCT UA Automated manually resulted (Completed)    Urinary frequency        Relevant Medications    triamcinolone acetonide (Kenalog-40) injection 40 mg (Completed) (Start on 11/14/2024 11:30 AM)    gentamicin (Garamycin) injection 80 mg (Completed) (Start on 11/14/2024 11:30 AM)    sodium bicarbonate 1 mEq/mL (8.4 %) injection 5 mEq (Completed) (Start on 11/14/2024 11:30 AM)    lidocaine (Xylocaine) 10 mg/mL (1 %) injection 20 mL (Completed) (Start on 11/14/2024 11:30 AM)            Orders Placed This Encounter   Procedures    Bladder Catheterization     This order was created via procedure documentation    POCT UA Automated manually resulted     Order Specific Question:   Release result to [facility]     Answer:   Immediate [1]           Plan:  Pelvic floor physical therapy  Uribel if needed  Bladder instillation monthly  Plans to contact Dr. Hernandez about another hydrodistention  Has been manageable, 60-70% better until recent flare up    Vaginal culture sent  Nurse line [phone]        Patient ID: rTacey Villa is a 24 y.o. female.    Bladder Catheterization    Date/Time: 11/14/2024 9:57 AM    Performed by: SANTOS Regalado  Authorized by: SANTOS Regalado    Procedure Details    Procedure: bladder irrigation      Catheter size: 14 Fr    Complexity: simple      Urine characteristics: clear    Post-Procedure Details     Outcome: patient tolerated procedure well with no complications      Additional Details      IC cocktail lidocaine, trimacinolone, gentamicin, bicarb          Karlee L Dasilva, APRN-CNP  Lab Results   Component Value Date     GLUCOSE 78 04/05/2024    CALCIUM 9.6 04/05/2024     04/05/2024    K 4.3 04/05/2024    CO2 26 04/05/2024     04/05/2024    BUN 19 04/05/2024    CREATININE 0.89 04/05/2024     Prelief [employer]  Tamsulosin, can take two for a bit then go back to one  Gemtesa daily  Amitriptyline has increased to 40 mg  Has appt. For next instillation in August  Nurse line [phone]

## 2024-11-14 NOTE — LETTER
November 14, 2024     Patient: Tracey Villa   YOB: 2000   Date of Visit: 11/14/2024       To Whom It May Concern:    Tracey Villa was seen in my clinic on 11/14/2024 at 9:20 am. Please excuse Tracey for her absence from school on this day to make the appointment.    If you have any questions or concerns, please don't hesitate to call.         Sincerely,         TEODORO Regalado-CNP        CC: No Recipients

## 2024-11-15 LAB
CLUE CELLS VAG LPF-#/AREA: NORMAL /[LPF]
NUGENT SCORE: 3
YEAST VAG WET PREP-#/AREA: NORMAL

## 2024-11-22 ENCOUNTER — PATIENT MESSAGE (OUTPATIENT)
Dept: OBSTETRICS AND GYNECOLOGY | Facility: CLINIC | Age: 24
End: 2024-11-22
Payer: MEDICAID

## 2024-11-22 DIAGNOSIS — Z30.41 ENCOUNTER FOR SURVEILLANCE OF CONTRACEPTIVE PILLS: ICD-10-CM

## 2024-11-25 ENCOUNTER — APPOINTMENT (OUTPATIENT)
Dept: RADIOLOGY | Facility: HOSPITAL | Age: 24
End: 2024-11-25
Payer: MEDICAID

## 2024-11-25 RX ORDER — DROSPIRENONE 4 MG/1
4 TABLET, FILM COATED ORAL DAILY
Qty: 90 TABLET | Refills: 3 | Status: SHIPPED | OUTPATIENT
Start: 2024-11-25 | End: 2025-11-25

## 2024-11-29 DIAGNOSIS — N30.10 INTERSTITIAL CYSTITIS: ICD-10-CM

## 2024-12-03 RX ORDER — HYDROXYZINE HYDROCHLORIDE 25 MG/1
25 TABLET, FILM COATED ORAL NIGHTLY
Qty: 30 TABLET | Refills: 0 | Status: SHIPPED | OUTPATIENT
Start: 2024-12-03

## 2024-12-10 ENCOUNTER — TELEMEDICINE (OUTPATIENT)
Dept: PRIMARY CARE | Facility: CLINIC | Age: 24
End: 2024-12-10
Payer: MEDICAID

## 2024-12-10 DIAGNOSIS — J32.9 SINUSITIS, UNSPECIFIED CHRONICITY, UNSPECIFIED LOCATION: Primary | ICD-10-CM

## 2024-12-10 PROCEDURE — 99213 OFFICE O/P EST LOW 20 MIN: CPT

## 2024-12-10 PROCEDURE — 1036F TOBACCO NON-USER: CPT

## 2024-12-10 RX ORDER — AMOXICILLIN AND CLAVULANATE POTASSIUM 875; 125 MG/1; MG/1
875 TABLET, FILM COATED ORAL 2 TIMES DAILY
Qty: 20 TABLET | Refills: 0 | Status: SHIPPED | OUTPATIENT
Start: 2024-12-10 | End: 2024-12-20

## 2024-12-10 NOTE — PROGRESS NOTES
Subjective   Patient ID: Tracey Villa is a 24 y.o. female who presents for URI.  HPI  -PNA and pink eye going around at her -- has been sick and worsening since last friday   - sister was over saturday and went to urgent care yesterday with eye discharge and swelling and diagnosed with pink eye but pt herself has not had any eye complaints.  - congestion and rhinorrhea , clear  - sore throat   - chills, hot sweats, pins and needles sensation on skin   - L ear pain   - mild cough- productive yellow green sputum.   - some n/v/d,abdominal pain but probably unrelated as it has been going on     Current Outpatient Medications:     amitriptyline (Elavil) 10 mg tablet, Take 4 tablets (40 mg) by mouth once daily at bedtime., Disp: 360 tablet, Rfl: 2    amoxicillin-pot clavulanate (Augmentin) 875-125 mg tablet, Take 1 tablet (875 mg) by mouth 2 times a day for 10 days., Disp: 20 tablet, Rfl: 0    atogepant 10 mg tablet, Take 10 mg by mouth once daily., Disp: , Rfl:     cyclobenzaprine (Flexeril) 5 mg tablet, Take 1-2 tablets (5-10 mg) by mouth every 8 hours if needed for muscle spasms., Disp: 90 tablet, Rfl: 2    diazePAM (Valium) 5 mg tablet, Take 1 tablet (5 mg) by mouth as needed at bedtime. Insert one pill vaginally as needed for pain, Disp: , Rfl:     drospirenone, contraceptive, (Slynd) 4 mg (28) tablet, Take 1 tablet by mouth once daily., Disp: 90 tablet, Rfl: 3    EPINEPHrine (Epipen) 0.3 mg/0.3 mL injection syringe, Inject 0.3 mL (0.3 mg) into the muscle if needed for anaphylaxis for up to 1 day. Call 911 after use., Disp: 2 each, Rfl: 2    gabapentin (Neurontin) 100 mg capsule, Take 1 capsule (100 mg) by mouth 3 times a day., Disp: 90 capsule, Rfl: 11    Gemtesa 75 mg tablet, Take 1 tablet (75 mg) by mouth once daily., Disp: 90 tablet, Rfl: 3    hydrOXYzine HCL (Atarax) 25 mg tablet, TAKE 1 TABLET BY MOUTH ONCE DAILY AT BEDTIME, Disp: 30 tablet, Rfl: 0    loratadine (Claritin) 10 mg tablet, Take 1  tablet (10 mg) by mouth once daily as needed., Disp: , Rfl:     methen-m.blue-s.phos-phsal-hyo (UribeL) 118-10-40.8-36 mg capsule, Take 1 capsule by mouth once daily as needed (bladder spasms, urethral pain)., Disp: 30 capsule, Rfl: 3    metoclopramide (Reglan) 10 mg tablet, Take 1 tablet (10 mg) by mouth every 12 hours if needed (for nausea) for up to 14 days., Disp: 28 tablet, Rfl: 0    omeprazole (PriLOSEC) 40 mg DR capsule, TAKE 1 CAPSULE BY MOUTH ONCE DAILY IN THE MORNING BEFORE MEAL(S), Disp: 90 capsule, Rfl: 0    ondansetron ODT (Zofran-ODT) 4 mg disintegrating tablet, Take 1 tablet (4 mg) by mouth every 6 hours if needed for nausea., Disp: 20 tablet, Rfl: 11    propranolol (Inderal) 40 mg tablet, Take 1 tablet (40 mg) by mouth 2 times a day., Disp: 60 tablet, Rfl: 5    rizatriptan (Maxalt) 5 mg tablet, Take 1 tablet (5 mg) by mouth 1 time if needed for migraine (may repeat x1). May repeat in 2 hours if unresolved. Do not exceed 10 mg in 24 hours., Disp: 9 tablet, Rfl: 1    tamsulosin (Flomax) 0.4 mg 24 hr capsule, Take 2 capsules (0.8 mg) by mouth once daily at bedtime., Disp: 180 capsule, Rfl: 3    triamcinolone (Kenalog) 0.1 % cream, Apply topically 2 times a day. Apply to affected area 1-2 times daily as needed. Avoid face and groin., Disp: 30 g, Rfl: 0    ubrogepant (Ubrelvy) 100 mg tablet tablet, Take 1 tablet (100 mg) by mouth if needed (Take one tablet at onset of migraine. If still present 2 hrs later, can take a second tablet.)., Disp: 16 tablet, Rfl: 0   Past Surgical History:   Procedure Laterality Date    COLPOSCOPY  09/15/24    OTHER SURGICAL HISTORY  02/20/2020    No history of surgery      Past Medical History:   Diagnosis Date    Acute upper respiratory infection, unspecified 02/09/2018    Acute upper respiratory infection    Anemia     Contact with and (suspected) exposure to other viral communicable diseases 12/22/2014    Exposure to influenza    Depression 2021    Pediculosis due to  pediculus humanus capitis 12/03/2015    Lice infested hair    Personal history of diseases of the blood and blood-forming organs and certain disorders involving the immune mechanism 08/09/2019    History of iron deficiency anemia    Personal history of other diseases of the respiratory system 12/22/2014    History of pharyngitis    Personal history of other diseases of the respiratory system 02/09/2018    History of sore throat    Personal history of other specified conditions 12/22/2014    History of fever    Personal history of other specified conditions 11/27/2019    History of epistaxis    Urinary tract infection      Social History     Tobacco Use    Smoking status: Never    Smokeless tobacco: Never   Vaping Use    Vaping status: Never Used   Substance Use Topics    Alcohol use: Not Currently    Drug use: Never      Family History   Problem Relation Name Age of Onset    Other (anxiety and depression) Mother Guadalupe     Hyperlipidemia Mother Guadalupe     Cancer Mother Guadalupe     Depression Mother Guadalupe     Genetic Testing Mother Guadalupe     Hypertension Mother Guadalupe     Mental illness Mother Guadalupe     Alcohol abuse Maternal Grandfather Jaro     Heart disease Maternal Grandfather Jaro     Hypertension Maternal Grandfather Jaro     Mental illness Maternal Grandfather Jaro     Stroke Maternal Grandfather Jaro     Alcohol abuse Maternal Grandmother Nina     Hypertension Maternal Grandmother Nina     Mental illness Maternal Grandmother Nina     Stroke Maternal Grandmother Nina     Alcohol abuse Mother's Sister CherylAnne     Alcohol abuse Mother's Brother Jaro     Alcohol abuse Mother's Sister Queenie     Vision loss Mother's Sister Queenie     Depression Sister Sophitia     Mental illness Sister Sophitia     Drug abuse Father's Brother Antolin       Review of Systems  10 point ROS negative except as otherwise noted in the HPI.      Objective   There were no vitals taken for this visit.   Physical Exam    Limited exam due to  nature of virtual visit.      Assessment/Plan   Problem List Items Addressed This Visit    None  Visit Diagnoses       Sinusitis, unspecified chronicity, unspecified location    -  Primary  - Pt with congestion, chills, body aches, L ear pain since friday, now w productive cough   - will start augmentin   - continue supportive care, fluids  - call if not improved after abx    Relevant Medications    amoxicillin-pot clavulanate (Augmentin) 875-125 mg tablet          A video visit between the patient (at the originating site) and the provider (at the distant site) was utilized to provide this telehealth service.     Verbal consent was requested and obtained from the patient on this date for a telehealth visit. The patient was informed about the telehealth clinical encounter including benefits to avoiding travel, limitations to the assessment, and billing for the service. In person care may be recommended if needed. Telehealth sessions are not being recorded and personal health information is protected. All questions were answered and verbal informal consent was obtained from the patient to proceed.     Discussed at visit any disease processes that were of concern as well as the risks, benefits and instructions on any new medication provided. Patient (and/or caretaker of patient if present) stated all questions were answered, and they voiced understanding of instructions.     Bibiana Silva PA-C

## 2024-12-10 NOTE — LETTER
December 10, 2024     Patient: Tracey Villa   YOB: 2000   Date of Visit: 12/10/2024       To Whom It May Concern:    Tracey Villa was seen in my clinic on 12/10/2024 at 1:15 pm. Please excuse Tracey for her absence from school 12/9 and 12/10 for illness.    If you have any questions or concerns, please don't hesitate to call.         Sincerely,         Bibiana Silva PA-C        CC: No Recipients

## 2024-12-10 NOTE — PATIENT INSTRUCTIONS
TREATMENT FOR SINUSITIS AND UPPER RESPIRATORY INFECTIONS:     Drink plenty of fluids, especially water.     Used humidifiers, steam, hot liquids to moisten your nasal passages.      Saline nasal spray often helps,  Simply Saline is a nice over the counter saline spray that you can use as much as you want.     IF DIRECTED TO DO SO:    You can use Afrin nasal spray for the first 3 days  ONLY , after that, it will make you worse, not better. DO NOT USE IN CHILDREN UNDER 6 YEARS OF AGE OR IF YOU HAVE ANY HYPERTENTION OR HEART ISSUES.      Mucinex or guaifenesin is an over the counter medication that often helps loosen the mucous.  DO NOT USE IN CHILDREN UNDER 6 YEARS OF AGE.      Please be sure to call or follow-up if you are not better in 5-10 days, or if you are worsening.      The most common cause of upper respiratory infections are viruses - which no not need an antibiotic to get better.   We want your own immune system to fight the virus so you or your child develop immunity to it.    However,  people can develop pneumonia, sinus infections and sometimes ear infections from a virus  - which may need an antibiotic.   So if you are showing signs of breathing issues,  or severe ear pain or facial pain with fevers, of if you are no better after 10 days , its important that you contact us.        If you are prescribed an antibiotic,  it's a good idea to take a probiotic to help prevent diarrhea and yeast infections.  This would be eating a yogurt daily or taking something like acidophillus or Culturelle.        To relieve the pain of sore throat, you can:   -Take over-the-counter pain medicine - Acetaminophen (sample brand name: Tylenol) or ibuprofen (sample brand names: Advil, Motrin) can help with throat pain.  -Use sore throat lozenges or sprays - Using medicated sore throat lozenges or throat sprays can temporarily reduce throat pain.  -Suck on hard candies, ice chips, or ice pops.  -Gargle with salt water - Some  people find that this helps with throat pain.  -Use a cool mist humidifier - This adds moisture to the air to keep the throat from getting too dry and might help with pain.  -Avoid smoking or being around people who are smoking - Smoke can make throat pain worse.       EDUCATION ABOUT TAKING ANTIBIOTICS:     It is very important to complete the entire course of antibiotics as directed.  This helps prevent antibiotic resistant forms of bacteria.     You may want to create a chart, and lauryn off the doses taken to remember them all.     Common side effects of antibiotics include yeast infections, diarrhea and nausea. Sometimes over-the-counter probiotics (such as eating yogurt or taking acidophilus or Culturelle)  may help prevent the diarrhea and yeast infections caused by antibiotics. If you develop persistent or bloody diarrhea after taking an antibiotic, please contact your provider about the possibility of a serious secondary infection of your colon caused by the antibiotic. Sometimes the nausea from antibiotics can be helped by taking the antibiotic with food unless otherwise specified not to by the pharmacist.     If you develop a rash while on the antibiotic, if could be from the antibiotic, from the illness itself, or could be from a response by some viral infections to the antibiotic. Please discuss this with your provider before assuming that you are allergic to the medication.    If it is determined that you have had an allergic reaction to the antibiotic, please make sure you make note of that for yourself to be sure to never get that antibiotic again as a more serious reaction - called anaphylaxis - may occur.   You should also ask about similar antibiotics that may be dangerous as well.    If you are a woman on birth control, it is important you use a back up form of contraception for the next month to prevent pregnancy as some antibiotics reduce the effectiveness of birth control. This could result in  an unplanned pregnancy.

## 2024-12-28 DIAGNOSIS — K21.9 CHRONIC GERD: ICD-10-CM

## 2024-12-30 ENCOUNTER — APPOINTMENT (OUTPATIENT)
Dept: PRIMARY CARE | Facility: CLINIC | Age: 24
End: 2024-12-30
Payer: MEDICAID

## 2024-12-30 VITALS
WEIGHT: 155 LBS | DIASTOLIC BLOOD PRESSURE: 80 MMHG | HEART RATE: 75 BPM | SYSTOLIC BLOOD PRESSURE: 112 MMHG | BODY MASS INDEX: 24.28 KG/M2 | OXYGEN SATURATION: 97 %

## 2024-12-30 DIAGNOSIS — G89.29 CHRONIC NONINTRACTABLE HEADACHE, UNSPECIFIED HEADACHE TYPE: ICD-10-CM

## 2024-12-30 DIAGNOSIS — R41.89 BRAIN FOG: ICD-10-CM

## 2024-12-30 DIAGNOSIS — R51.9 CHRONIC NONINTRACTABLE HEADACHE, UNSPECIFIED HEADACHE TYPE: ICD-10-CM

## 2024-12-30 DIAGNOSIS — G90.A POTS (POSTURAL ORTHOSTATIC TACHYCARDIA SYNDROME): ICD-10-CM

## 2024-12-30 DIAGNOSIS — G43.909 EPISODIC MIGRAINE: Primary | ICD-10-CM

## 2024-12-30 PROCEDURE — 99214 OFFICE O/P EST MOD 30 MIN: CPT

## 2024-12-30 PROCEDURE — 1036F TOBACCO NON-USER: CPT

## 2024-12-30 RX ORDER — OMEPRAZOLE 40 MG/1
40 CAPSULE, DELAYED RELEASE ORAL
Qty: 90 CAPSULE | Refills: 0 | Status: SHIPPED | OUTPATIENT
Start: 2024-12-30

## 2024-12-30 RX ORDER — FREMANEZUMAB-VFRM 225 MG/1.5ML
225 INJECTION SUBCUTANEOUS
Qty: 1 EACH | Refills: 11 | Status: SHIPPED | OUTPATIENT
Start: 2024-12-30

## 2024-12-30 NOTE — PATIENT INSTRUCTIONS
Continue current medications  I will call you when I know moer about ajovy and ubrelvy. If you get ajovy stop qulipta so we can see how it works  Neuropsych ordered    Suggestions for preventing or controlling migraines:  - Riboflavin (vitamin B2) 200 mg twice a day and magnesium oxide 400-600 mg daily for prevention.   - CoQ10 100 mg daily increasing to 400 mg daily may also be helpful  - avoid triggers that can cause or worsen migraines (food, lack of sleep, stress, etc.)  - keep a diary of your headaches to note how often you get them, how long they last, and any other helpful information   - avoid taking abortive medication (NOT preventative medication) more than 3 days a week (includes both prescription and over the counter meds)  - take your preventative medication as directed. Let me know if you have side effects or problems with the medication. Do not suddenly stop the medication.  - melatonin 3 mg at night can help with sleep and headaches.

## 2024-12-30 NOTE — PROGRESS NOTES
Subjective   Patient ID: Tracey Villa is a 24 y.o. female who presents for Follow-up (migraines).  HPI  - migraines are about the same or worse. Having really severe ones that cause nausea, inhibit function about once per week   - she is missing work and school   - she is unable to complete her day to day tasks   - has been in the ED for migraines and acute migraine treatment   - has vomiting from migraines- zofran does help  - she does have a lot of brain fog. Noticing some short term memory problems, losing objects. Notices at school that she has trouble with comprehension or processing information that she has read. MRI brain was normal. Has ADHD, not medicated.   - As far as POTS, has been having increased HR again throughout the day. Takes propranolol 40 BID. Tolerating well. Intaking adequate salt and water.      Current Acute Headache Treatment -Ubrelvy samples help  -Zofran helps w nausea      Current Preventative Headache Treatment -Amitriptyline - has not helped migraines   -Propranolol  - has helped POTS but not migraines  - gabapentin 100 mg for RLS but does not help her migraines  -Qulipta samples help   Previous Acute Headache Treatment -Sumatriptan 50 did not help  -Sumatriptan 100- did not help   -Rizatriptan ODT did not help  -Reglan did not help nausea   -Nurtec did not help     Previous preventive Flexeril   Zanaflex   Cymbalta- did not tolerate        Current Outpatient Medications:     amitriptyline (Elavil) 10 mg tablet, Take 4 tablets (40 mg) by mouth once daily at bedtime., Disp: 360 tablet, Rfl: 2    atogepant 10 mg tablet, Take 10 mg by mouth once daily., Disp: , Rfl:     cyclobenzaprine (Flexeril) 5 mg tablet, Take 1-2 tablets (5-10 mg) by mouth every 8 hours if needed for muscle spasms., Disp: 90 tablet, Rfl: 2    diazePAM (Valium) 5 mg tablet, Take 1 tablet (5 mg) by mouth as needed at bedtime. Insert one pill vaginally as needed for pain, Disp: , Rfl:     drospirenone,  contraceptive, (Slynd) 4 mg (28) tablet, Take 1 tablet by mouth once daily., Disp: 90 tablet, Rfl: 3    EPINEPHrine (Epipen) 0.3 mg/0.3 mL injection syringe, Inject 0.3 mL (0.3 mg) into the muscle if needed for anaphylaxis for up to 1 day. Call 911 after use., Disp: 2 each, Rfl: 2    gabapentin (Neurontin) 100 mg capsule, Take 1 capsule (100 mg) by mouth 3 times a day., Disp: 90 capsule, Rfl: 11    Gemtesa 75 mg tablet, Take 1 tablet (75 mg) by mouth once daily., Disp: 90 tablet, Rfl: 3    hydrOXYzine HCL (Atarax) 25 mg tablet, TAKE 1 TABLET BY MOUTH ONCE DAILY AT BEDTIME, Disp: 30 tablet, Rfl: 0    loratadine (Claritin) 10 mg tablet, Take 1 tablet (10 mg) by mouth once daily as needed., Disp: , Rfl:     methen-m.blue-s.phos-phsal-hyo (UribeL) 118-10-40.8-36 mg capsule, Take 1 capsule by mouth once daily as needed (bladder spasms, urethral pain)., Disp: 30 capsule, Rfl: 3    omeprazole (PriLOSEC) 40 mg DR capsule, TAKE 1 CAPSULE BY MOUTH ONCE DAILY IN THE MORNING BEFORE MEAL(S), Disp: 90 capsule, Rfl: 0    ondansetron ODT (Zofran-ODT) 4 mg disintegrating tablet, Take 1 tablet (4 mg) by mouth every 6 hours if needed for nausea., Disp: 20 tablet, Rfl: 11    propranolol (Inderal) 40 mg tablet, Take 1 tablet (40 mg) by mouth 2 times a day., Disp: 60 tablet, Rfl: 5    tamsulosin (Flomax) 0.4 mg 24 hr capsule, Take 2 capsules (0.8 mg) by mouth once daily at bedtime., Disp: 180 capsule, Rfl: 3    fremanezumab (Ajovy Autoinjector) 225 mg/1.5 mL auto-injector, Inject 1 Pen (225 mg) under the skin every 28 (twenty-eight) days., Disp: 1 each, Rfl: 11    metoclopramide (Reglan) 10 mg tablet, Take 1 tablet (10 mg) by mouth every 12 hours if needed (for nausea) for up to 14 days., Disp: 28 tablet, Rfl: 0    triamcinolone (Kenalog) 0.1 % cream, Apply topically 2 times a day. Apply to affected area 1-2 times daily as needed. Avoid face and groin. (Patient not taking: Reported on 12/30/2024), Disp: 30 g, Rfl: 0    ubrogepant  (Ubrelvy) 100 mg tablet tablet, Take 1 tablet (100 mg) by mouth if needed (Take one tablet at onset of migraine. If still present 2 hrs later, can take a second tablet.)., Disp: 16 tablet, Rfl: 3   Past Surgical History:   Procedure Laterality Date    COLPOSCOPY  09/15/24    OTHER SURGICAL HISTORY  02/20/2020    No history of surgery      Past Medical History:   Diagnosis Date    Acute upper respiratory infection, unspecified 02/09/2018    Acute upper respiratory infection    Anemia     Contact with and (suspected) exposure to other viral communicable diseases 12/22/2014    Exposure to influenza    Depression 2021    Pediculosis due to pediculus humanus capitis 12/03/2015    Lice infested hair    Personal history of diseases of the blood and blood-forming organs and certain disorders involving the immune mechanism 08/09/2019    History of iron deficiency anemia    Personal history of other diseases of the respiratory system 12/22/2014    History of pharyngitis    Personal history of other diseases of the respiratory system 02/09/2018    History of sore throat    Personal history of other specified conditions 12/22/2014    History of fever    Personal history of other specified conditions 11/27/2019    History of epistaxis    Urinary tract infection      Social History     Tobacco Use    Smoking status: Never    Smokeless tobacco: Never   Vaping Use    Vaping status: Never Used   Substance Use Topics    Alcohol use: Not Currently    Drug use: Never      Family History   Problem Relation Name Age of Onset    Other (anxiety and depression) Mother Guadalupe     Hyperlipidemia Mother Guadalupe     Cancer Mother Guadalupe     Depression Mother Guadalupe     Genetic Testing Mother Guadalupe     Hypertension Mother Guadalupe     Mental illness Mother Guadalupe     Alcohol abuse Maternal Grandfather Jaro     Heart disease Maternal Grandfather Jaro     Hypertension Maternal Grandfather Jaro     Mental illness Maternal Grandfather Jaro     Stroke  Maternal Grandfather Jaro     Alcohol abuse Maternal Grandmother Nina     Hypertension Maternal Grandmother Nina     Mental illness Maternal Grandmother Nina     Stroke Maternal Grandmother Nina     Alcohol abuse Mother's Sister Epi     Alcohol abuse Mother's Brother Jaro     Alcohol abuse Mother's Sister Queenie     Vision loss Mother's Sister Queenie     Depression Sister Sophitia     Mental illness Sister Sophitia     Drug abuse Father's Brother Antolin       Review of Systems  10 point ROS negative except as otherwise noted in the HPI.      Objective   /80   Pulse 75   Wt 70.3 kg (155 lb)   SpO2 97%   BMI 24.28 kg/m²    Physical Exam  Constitutional:       Appearance: Normal appearance.   HENT:      Head: Normocephalic and atraumatic.   Eyes:      Extraocular Movements: Extraocular movements intact.      Pupils: Pupils are equal, round, and reactive to light.   Cardiovascular:      Rate and Rhythm: Normal rate and regular rhythm.      Pulses: Normal pulses.      Heart sounds: Normal heart sounds.   Pulmonary:      Effort: Pulmonary effort is normal.      Breath sounds: Normal breath sounds.   Musculoskeletal:         General: Normal range of motion.      Right lower leg: No edema.      Left lower leg: No edema.   Skin:     General: Skin is warm and dry.      Findings: No rash.   Neurological:      General: No focal deficit present.      Mental Status: She is alert and oriented to person, place, and time.   Psychiatric:         Mood and Affect: Mood normal.         Behavior: Behavior normal.           Assessment/Plan   Problem List Items Addressed This Visit       Episodic migraine - Primary  - Pt with episodic migraine, not really improving. Having frequent headaches and weekly having at least 1 severe migraine causing her to miss school/work, vomit, cannot function.   - Has failed cymbalta, amitriptyline, propranolol, gabapentin, nurtec for prevention. Qulipta samples helped.   - Has failed  rizatriptan, sumatriptan, nurtec for rescue. Ubrelvy samples helped.   - Will resubmit for ajovy now that she has tried enough step therapies per the last denial. Will also resubmit ubrelvy now that she has failed two triptans and nurtec.     Relevant Medications    fremanezumab (Ajovy Autoinjector) 225 mg/1.5 mL auto-injector    ubrogepant (Ubrelvy) 100 mg tablet tablet    Brain fog  - she has been complaining of short term memory problems, forgetfulness, issues comprehending and remembering passages after she reads them and struggling with discussion sessions in class.   - Suspect multifactorial due to pain, headaches, polypharmacy, post concussion syndrome, unmedicated ADHD  - will order neuropsych testing    Relevant Orders    Referral to Adult Neuropsychology    POTS (postural orthostatic tachycardia syndrome)  -having more increased HR lately and sx of POTS despite propranolol 40 mg BID which she tolerates well and adequate salt and water intake  - add 20 mg of propranolol to mid day.                 Discussed at visit any disease processes that were of concern as well as the risks, benefits and instructions on any new medication provided. Patient (and/or caretaker of patient if present) stated all questions were answered, and they voiced understanding of instructions.     Bibiana Silva PA-C

## 2024-12-31 DIAGNOSIS — B96.89 BV (BACTERIAL VAGINOSIS): ICD-10-CM

## 2024-12-31 DIAGNOSIS — R35.0 URINARY FREQUENCY: Primary | ICD-10-CM

## 2024-12-31 DIAGNOSIS — N76.0 BV (BACTERIAL VAGINOSIS): ICD-10-CM

## 2024-12-31 RX ORDER — METRONIDAZOLE 500 MG/1
500 TABLET ORAL 2 TIMES DAILY
Qty: 14 TABLET | Refills: 0 | Status: SHIPPED | OUTPATIENT
Start: 2024-12-31 | End: 2025-01-07

## 2024-12-31 NOTE — PROGRESS NOTES
Patient called in with symptoms. Rx metronidazole for foul vaginal odor with discharge c/w BV sent in with holiday upon us, not drinking alcohol. Has standing order for urine if sx of UTI do not clear up with treating vaginal symptoms. Lost insurance this past year, now has it back, plans to continue with ultrasound discussed previously, already ordered by GYN.

## 2025-01-02 ENCOUNTER — DOCUMENTATION (OUTPATIENT)
Dept: PRIMARY CARE | Facility: CLINIC | Age: 25
End: 2025-01-02
Payer: MEDICAID

## 2025-01-02 DIAGNOSIS — N30.10 INTERSTITIAL CYSTITIS: ICD-10-CM

## 2025-01-02 RX ORDER — HYDROXYZINE HYDROCHLORIDE 25 MG/1
25 TABLET, FILM COATED ORAL NIGHTLY
Qty: 30 TABLET | Refills: 0 | Status: SHIPPED | OUTPATIENT
Start: 2025-01-02

## 2025-01-03 ENCOUNTER — DOCUMENTATION (OUTPATIENT)
Dept: PRIMARY CARE | Facility: CLINIC | Age: 25
End: 2025-01-03
Payer: MEDICAID

## 2025-01-07 ENCOUNTER — APPOINTMENT (OUTPATIENT)
Dept: NEUROLOGY | Facility: HOSPITAL | Age: 25
End: 2025-01-07
Payer: MEDICAID

## 2025-01-09 ENCOUNTER — APPOINTMENT (OUTPATIENT)
Dept: RADIOLOGY | Facility: HOSPITAL | Age: 25
End: 2025-01-09
Payer: MEDICAID

## 2025-01-16 ENCOUNTER — OFFICE VISIT (OUTPATIENT)
Dept: PRIMARY CARE | Facility: CLINIC | Age: 25
End: 2025-01-16
Payer: MEDICAID

## 2025-01-16 ENCOUNTER — HOSPITAL ENCOUNTER (OUTPATIENT)
Dept: RADIOLOGY | Facility: CLINIC | Age: 25
Discharge: HOME | End: 2025-01-16
Payer: MEDICAID

## 2025-01-16 VITALS
OXYGEN SATURATION: 99 % | HEIGHT: 67 IN | SYSTOLIC BLOOD PRESSURE: 118 MMHG | BODY MASS INDEX: 24.3 KG/M2 | TEMPERATURE: 97.7 F | WEIGHT: 154.8 LBS | DIASTOLIC BLOOD PRESSURE: 82 MMHG | HEART RATE: 96 BPM

## 2025-01-16 DIAGNOSIS — R10.84 GENERALIZED ABDOMINAL PAIN: Primary | ICD-10-CM

## 2025-01-16 DIAGNOSIS — R10.84 GENERALIZED ABDOMINAL PAIN: ICD-10-CM

## 2025-01-16 PROCEDURE — 74018 RADEX ABDOMEN 1 VIEW: CPT

## 2025-01-16 ASSESSMENT — PATIENT HEALTH QUESTIONNAIRE - PHQ9
1. LITTLE INTEREST OR PLEASURE IN DOING THINGS: SEVERAL DAYS
10. IF YOU CHECKED OFF ANY PROBLEMS, HOW DIFFICULT HAVE THESE PROBLEMS MADE IT FOR YOU TO DO YOUR WORK, TAKE CARE OF THINGS AT HOME, OR GET ALONG WITH OTHER PEOPLE: SOMEWHAT DIFFICULT
2. FEELING DOWN, DEPRESSED OR HOPELESS: SEVERAL DAYS
SUM OF ALL RESPONSES TO PHQ9 QUESTIONS 1 AND 2: 2

## 2025-01-16 NOTE — PROGRESS NOTES
Subjective   Patient ID: Tracey Villa is a 24 y.o. female who presents for Abdominal Pain and Shortness of Breath (Nausea since yesterday).  HPI  - started with abdominal pain and nausea  - pain migrates around   - pain feels like a squeezing   - took zofran and that didnt help nausea which it usually does if it is migraine related  - feels constipated  - has a BM only 1-2 x a week or so. Usually one is sticky, mucusy and the other time is more hard to pass and painful   - hard to get any stool out but when it comes out it may be soft  - no vomiting, no diarrhea   - passing gas  - no blood in stool but has been black-- has not taken iron or pepto bismol   - sticks to the toilet bowl   - no fevers, chills   - no heart burn   - has not tried anything  - drinks a lot of water and gatorade  - walks for work and school   - no chance of pregnancy   - does not bleed even on her placebo week of her slynd  - having an US of her ovaries next week     Current Outpatient Medications:     amitriptyline (Elavil) 10 mg tablet, Take 4 tablets (40 mg) by mouth once daily at bedtime., Disp: 360 tablet, Rfl: 2    atogepant 10 mg tablet, Take 10 mg by mouth once daily., Disp: , Rfl:     cyclobenzaprine (Flexeril) 5 mg tablet, Take 1-2 tablets (5-10 mg) by mouth every 8 hours if needed for muscle spasms., Disp: 90 tablet, Rfl: 2    diazePAM (Valium) 5 mg tablet, Take 1 tablet (5 mg) by mouth as needed at bedtime. Insert one pill vaginally as needed for pain, Disp: , Rfl:     drospirenone, contraceptive, (Slynd) 4 mg (28) tablet, Take 1 tablet by mouth once daily., Disp: 90 tablet, Rfl: 3    EPINEPHrine (Epipen) 0.3 mg/0.3 mL injection syringe, Inject 0.3 mL (0.3 mg) into the muscle if needed for anaphylaxis for up to 1 day. Call 911 after use., Disp: 2 each, Rfl: 2    fremanezumab (Ajovy Autoinjector) 225 mg/1.5 mL auto-injector, Inject 1 Pen (225 mg) under the skin every 28 (twenty-eight) days., Disp: 1 each, Rfl: 11     gabapentin (Neurontin) 100 mg capsule, Take 1 capsule (100 mg) by mouth 3 times a day., Disp: 90 capsule, Rfl: 11    Gemtesa 75 mg tablet, Take 1 tablet (75 mg) by mouth once daily., Disp: 90 tablet, Rfl: 3    hydrOXYzine HCL (Atarax) 25 mg tablet, TAKE 1 TABLET BY MOUTH ONCE DAILY AT BEDTIME, Disp: 30 tablet, Rfl: 0    methgregory-rosalindaLioblue-s.phos-phsal-hyo (UribeL) 118-10-40.8-36 mg capsule, Take 1 capsule by mouth once daily as needed (bladder spasms, urethral pain)., Disp: 30 capsule, Rfl: 3    omeprazole (PriLOSEC) 40 mg DR capsule, TAKE 1 CAPSULE BY MOUTH ONCE DAILY IN THE MORNING BEFORE MEAL(S), Disp: 90 capsule, Rfl: 0    ondansetron ODT (Zofran-ODT) 4 mg disintegrating tablet, Take 1 tablet (4 mg) by mouth every 6 hours if needed for nausea., Disp: 20 tablet, Rfl: 11    propranolol (Inderal) 40 mg tablet, Take 1 tablet (40 mg) by mouth 2 times a day., Disp: 60 tablet, Rfl: 5    tamsulosin (Flomax) 0.4 mg 24 hr capsule, Take 2 capsules (0.8 mg) by mouth once daily at bedtime., Disp: 180 capsule, Rfl: 3    triamcinolone (Kenalog) 0.1 % cream, Apply topically 2 times a day. Apply to affected area 1-2 times daily as needed. Avoid face and groin., Disp: 30 g, Rfl: 0    ubrogepant (Ubrelvy) 100 mg tablet tablet, Take 1 tablet (100 mg) by mouth if needed (Take one tablet at onset of migraine. If still present 2 hrs later, can take a second tablet.)., Disp: 16 tablet, Rfl: 3   Past Surgical History:   Procedure Laterality Date    COLPOSCOPY  09/15/24    OTHER SURGICAL HISTORY  02/20/2020    No history of surgery      Past Medical History:   Diagnosis Date    Acute upper respiratory infection, unspecified 02/09/2018    Acute upper respiratory infection    Anemia     Contact with and (suspected) exposure to other viral communicable diseases 12/22/2014    Exposure to influenza    Depression 2021    Pediculosis due to pediculus humanus capitis 12/03/2015    Lice infested hair    Personal history of diseases of the blood and  "blood-forming organs and certain disorders involving the immune mechanism 08/09/2019    History of iron deficiency anemia    Personal history of other diseases of the respiratory system 12/22/2014    History of pharyngitis    Personal history of other diseases of the respiratory system 02/09/2018    History of sore throat    Personal history of other specified conditions 12/22/2014    History of fever    Personal history of other specified conditions 11/27/2019    History of epistaxis    Urinary tract infection      Social History     Tobacco Use    Smoking status: Never    Smokeless tobacco: Never   Vaping Use    Vaping status: Never Used   Substance Use Topics    Alcohol use: Not Currently    Drug use: Never      Family History   Problem Relation Name Age of Onset    Other (anxiety and depression) Mother Guadalupe     Hyperlipidemia Mother Guadalupe     Cancer Mother Guadalupe     Depression Mother Guadalupe     Genetic Testing Mother Guadalupe     Hypertension Mother Guadalupe     Mental illness Mother Guadalupe     Alcohol abuse Maternal Grandfather Jaro     Heart disease Maternal Grandfather Jaro     Hypertension Maternal Grandfather Jaro     Mental illness Maternal Grandfather Jaro     Stroke Maternal Grandfather Jaro     Alcohol abuse Maternal Grandmother Nina     Hypertension Maternal Grandmother Nina     Mental illness Maternal Grandmother Nina     Stroke Maternal Grandmother Nina     Alcohol abuse Mother's Sister CherylAnne     Alcohol abuse Mother's Brother Jaro     Alcohol abuse Mother's Sister Queenie     Vision loss Mother's Sister Queenie     Depression Sister Sophitia     Mental illness Sister Sophitia     Drug abuse Father's Brother Antolin       Review of Systems  10 point ROS negative except as otherwise noted in the HPI.      Objective   /82   Pulse 96   Temp 36.5 °C (97.7 °F)   Ht 1.702 m (5' 7.01\")   Wt 70.2 kg (154 lb 12.8 oz)   LMP  (LMP Unknown) Comment: Slynd birth control = no menses and no chance of " pregnancy  per patient.  SpO2 99%   BMI 24.24 kg/m²    Physical Exam  Vitals reviewed.   Constitutional:       Appearance: Normal appearance.   Cardiovascular:      Rate and Rhythm: Normal rate and regular rhythm.      Pulses: Normal pulses.      Heart sounds: Normal heart sounds.   Pulmonary:      Effort: Pulmonary effort is normal.      Breath sounds: Normal breath sounds.   Abdominal:      General: Abdomen is flat. Bowel sounds are normal. There is no distension.      Palpations: Abdomen is soft. There is no mass.      Tenderness: There is abdominal tenderness. There is no right CVA tenderness, left CVA tenderness, guarding or rebound.   Musculoskeletal:         General: Normal range of motion.   Skin:     General: Skin is warm and dry.      Findings: No rash.   Neurological:      General: No focal deficit present.      Mental Status: She is alert and oriented to person, place, and time.   Psychiatric:         Mood and Affect: Mood normal.         Behavior: Behavior normal.           Assessment/Plan   Problem List Items Addressed This Visit    None  Visit Diagnoses       Generalized abdominal pain    -  Primary  - Pt with migrating, generalized abdominal pain. NO vomiting. No diarrhea. No fevers, chills. Passing gas. Has 2 Bms a week-- one typically loose and mucusy and sticky, one difficult for her and may take her hours, strains a lot   - XR abdomen with significant stool burden   - miralax 1 cap a day, increase fluids and exercise  - fecal occult blood test sent home w pt given report of black sticky stool   - wonder if there may be an aspect of endometriosis given pelvic and abdominal pain, GI complaints. Also, pt does not bleed during placebo week. Would have her fu w her obgyn to discuss.   - ER precautions     Relevant Orders    XR abdomen 1 view    Fecal Occult Blood Immunoassay            Discussed at visit any disease processes that were of concern as well as the risks, benefits and instructions on  any new medication provided. Patient (and/or caretaker of patient if present) stated all questions were answered, and they voiced understanding of instructions.     Bibiana Silva PA-C

## 2025-01-16 NOTE — PATIENT INSTRUCTIONS
Add a cap of miralax a day to a drink   Off brand is fine  Continue increasing fluids and getting good activity   Do the fecal occult blood test and mail it in   Follow up with OBGYN   I will call you with the final radiology read

## 2025-01-17 NOTE — PROGRESS NOTES
Called to tell me she is having pain in her left hip and in chest on the R side just above her breast  No SOB, palpitations right now  Mom wanted her to call because of how much pain she was in   Still has not been able to have  BM   Tried dulcolax, pre and pro biotic, miralax.  Recommended if she is not improving, if she is worsening, if she is having CP, SOB, palpitations then she needs to go to ER.

## 2025-01-20 ENCOUNTER — APPOINTMENT (OUTPATIENT)
Dept: PSYCHOLOGY | Facility: HOSPITAL | Age: 25
End: 2025-01-20
Payer: MEDICAID

## 2025-01-20 ENCOUNTER — APPOINTMENT (OUTPATIENT)
Dept: RADIOLOGY | Facility: HOSPITAL | Age: 25
End: 2025-01-20
Payer: MEDICAID

## 2025-01-22 ENCOUNTER — APPOINTMENT (OUTPATIENT)
Dept: ALLERGY | Facility: CLINIC | Age: 25
End: 2025-01-22
Payer: MEDICAID

## 2025-01-22 VITALS — HEART RATE: 83 BPM | DIASTOLIC BLOOD PRESSURE: 70 MMHG | SYSTOLIC BLOOD PRESSURE: 111 MMHG

## 2025-01-22 DIAGNOSIS — J31.0 CHRONIC RHINITIS: Primary | ICD-10-CM

## 2025-01-22 DIAGNOSIS — K21.9 GASTROESOPHAGEAL REFLUX DISEASE WITHOUT ESOPHAGITIS: ICD-10-CM

## 2025-01-22 DIAGNOSIS — T78.1XXA FOOD SENSITIVITY WITH GASTROINTESTINAL SYMPTOMS: ICD-10-CM

## 2025-01-22 PROCEDURE — 95004 PERQ TESTS W/ALRGNC XTRCS: CPT | Performed by: ALLERGY & IMMUNOLOGY

## 2025-01-22 PROCEDURE — 99204 OFFICE O/P NEW MOD 45 MIN: CPT | Performed by: ALLERGY & IMMUNOLOGY

## 2025-01-22 RX ORDER — FAMOTIDINE 40 MG/1
40 TABLET, FILM COATED ORAL DAILY
Qty: 30 TABLET | Refills: 11 | Status: SHIPPED | OUTPATIENT
Start: 2025-01-22 | End: 2026-01-17

## 2025-01-22 NOTE — PROGRESS NOTES
Subjective   Patient ID:   [id number]   Tracey Villa is a 24 y.o. female who presents for Food Allergy (Concerned abut food allergies-specifically tomatoes. Gets rash around mouth and sores in mouth. Has chest pain after eating tomatoes. Taking omeprazole for stomach upset & vomiting. Also has issues with chocolate & milk.).    Chief Complaint   Patient presents with    Food Allergy     Concerned abut food allergies-specifically tomatoes. Gets rash around mouth and sores in mouth. Has chest pain after eating tomatoes. Taking omeprazole for stomach upset & vomiting. Also has issues with chocolate & milk.          HPI  This patient is here to evaluate for:  Food allergies for 4 years.     Suspects:  Tomatoes - sores in her mouth, rash around lips that burns  Emesis, chest pains when she has pizza. Red sauce, soups with tomato, chili - occurs in 30-60 mins.     Chocolate - sores in her mouth, no gi upset  Milk -sores in her mouth,  no gi upset  Cocoa puff - hurts her mouth, but ok with honeycomb's and miniwheats.     This patient reports no known clinical allergic symptoms after eating EGG, COW'S MILK (mainly ok but bloting with some cheese; ok with yogurt)   SOY, WHEAT, FISH, SHELLFISH, PEANUT, TREE NUTS, and SESAME.     Dr. Castorena and Bibiana Silva gave her an epipen    Hydroxyzine for interstitial cystitis taken at bedtime     FH: Mom has gerd       Review of Systems   All other systems reviewed and are negative.        Objective     /70 (BP Location: Right arm, Patient Position: Sitting)   Pulse 83   LMP  (LMP Unknown) Comment: Slynd birth control = no menses and no chance of pregnancy  per patient.     Physical Exam  Constitutional:       General: She is not in acute distress.     Appearance: Normal appearance. She is not ill-appearing.   HENT:      Head: Normocephalic and atraumatic.      Right Ear: Tympanic membrane, ear canal and external ear normal.      Left Ear: Tympanic membrane, ear canal and  external ear normal.      Nose: Nose normal. No congestion or rhinorrhea.      Mouth/Throat:      Mouth: Mucous membranes are moist.      Pharynx: Oropharynx is clear. No oropharyngeal exudate or posterior oropharyngeal erythema.   Eyes:      General:         Right eye: No discharge.         Left eye: No discharge.      Conjunctiva/sclera: Conjunctivae normal.   Cardiovascular:      Rate and Rhythm: Normal rate and regular rhythm.      Heart sounds: Normal heart sounds. No murmur heard.     No friction rub. No gallop.   Pulmonary:      Effort: Pulmonary effort is normal. No respiratory distress.      Breath sounds: Normal breath sounds. No stridor. No wheezing, rhonchi or rales.   Chest:      Chest wall: No tenderness.   Abdominal:      General: Abdomen is flat.      Palpations: Abdomen is soft.   Musculoskeletal:         General: Normal range of motion.      Cervical back: Normal range of motion and neck supple.   Lymphadenopathy:      Cervical: No cervical adenopathy.   Skin:     General: Skin is warm and dry.      Findings: No erythema, lesion or rash.   Neurological:      General: No focal deficit present.      Mental Status: She is alert. Mental status is at baseline.   Psychiatric:         Mood and Affect: Mood normal.         Behavior: Behavior normal.         Thought Content: Thought content normal.         Judgment: Judgment normal.            Current Outpatient Medications   Medication Sig Dispense Refill    amitriptyline (Elavil) 10 mg tablet Take 4 tablets (40 mg) by mouth once daily at bedtime. 360 tablet 2    atogepant 10 mg tablet Take 10 mg by mouth once daily.      cyclobenzaprine (Flexeril) 5 mg tablet Take 1-2 tablets (5-10 mg) by mouth every 8 hours if needed for muscle spasms. 90 tablet 2    diazePAM (Valium) 5 mg tablet Take 1 tablet (5 mg) by mouth as needed at bedtime. Insert one pill vaginally as needed for pain      drospirenone, contraceptive, (Slynd) 4 mg (28) tablet Take 1 tablet by  mouth once daily. 90 tablet 3    EPINEPHrine (Epipen) 0.3 mg/0.3 mL injection syringe Inject 0.3 mL (0.3 mg) into the muscle if needed for anaphylaxis for up to 1 day. Call 911 after use. 2 each 2    fremanezumab (Ajovy Autoinjector) 225 mg/1.5 mL auto-injector Inject 1 Pen (225 mg) under the skin every 28 (twenty-eight) days. 1 each 11    gabapentin (Neurontin) 100 mg capsule Take 1 capsule (100 mg) by mouth 3 times a day. 90 capsule 11    Gemtesa 75 mg tablet Take 1 tablet (75 mg) by mouth once daily. 90 tablet 3    hydrOXYzine HCL (Atarax) 25 mg tablet TAKE 1 TABLET BY MOUTH ONCE DAILY AT BEDTIME 30 tablet 0    methen-m.blue-s.phos-phsal-hyo (UribeL) 118-10-40.8-36 mg capsule Take 1 capsule by mouth once daily as needed (bladder spasms, urethral pain). 30 capsule 3    omeprazole (PriLOSEC) 40 mg DR capsule TAKE 1 CAPSULE BY MOUTH ONCE DAILY IN THE MORNING BEFORE MEAL(S) 90 capsule 0    ondansetron ODT (Zofran-ODT) 4 mg disintegrating tablet Take 1 tablet (4 mg) by mouth every 6 hours if needed for nausea. 20 tablet 11    propranolol (Inderal) 40 mg tablet Take 1 tablet (40 mg) by mouth 2 times a day. 60 tablet 5    tamsulosin (Flomax) 0.4 mg 24 hr capsule Take 2 capsules (0.8 mg) by mouth once daily at bedtime. 180 capsule 3    triamcinolone (Kenalog) 0.1 % cream Apply topically 2 times a day. Apply to affected area 1-2 times daily as needed. Avoid face and groin. 30 g 0    ubrogepant (Ubrelvy) 100 mg tablet tablet Take 1 tablet (100 mg) by mouth if needed (Take one tablet at onset of migraine. If still present 2 hrs later, can take a second tablet.). 16 tablet 3     No current facility-administered medications for this visit.       Summary of the labs over the past 6 months:    Office Visit on 11/14/2024   Component Date Value Ref Range Status    POC Color, Urine 11/14/2024 Yellow  Straw, Yellow, Light-Yellow Final    POC Appearance, Urine 11/14/2024 Clear  Clear Final    POC Glucose, Urine 11/14/2024 NEGATIVE   NEGATIVE mg/dl Final    POC Bilirubin, Urine 11/14/2024 NEGATIVE  NEGATIVE Final    POC Ketones, Urine 11/14/2024 NEGATIVE  NEGATIVE mg/dl Final    POC Specific Gravity, Urine 11/14/2024 1.020  1.005 - 1.035 Final    POC Blood, Urine 11/14/2024 NEGATIVE  NEGATIVE Final    POC PH, Urine 11/14/2024 6.0  No Reference Range Established PH Final    POC Protein, Urine 11/14/2024 NEGATIVE  NEGATIVE, 30 (1+) mg/dl Final    POC Urobilinogen, Urine 11/14/2024 0.2  0.2, 1.0 EU/DL Final    Poc Nitrite, Urine 11/14/2024 NEGATIVE  NEGATIVE Final    POC Leukocytes, Urine 11/14/2024 TRACE (A)  NEGATIVE Final    Sarah Score 11/14/2024 3  0 - 3 Final    Yeast 11/14/2024 ABSENT  ABSENT Final    Clue Cells 11/14/2024 ABSENT  ABSENT Final   Office Visit on 10/17/2024   Component Date Value Ref Range Status    POC Color, Urine 10/17/2024 Yellow  Straw, Yellow, Light-Yellow Final    POC Appearance, Urine 10/17/2024 Clear  Clear Final    POC Glucose, Urine 10/17/2024 NEGATIVE  NEGATIVE mg/dl Final    POC Bilirubin, Urine 10/17/2024 NEGATIVE  NEGATIVE Final    POC Ketones, Urine 10/17/2024 NEGATIVE  NEGATIVE mg/dl Final    POC Specific Gravity, Urine 10/17/2024 1.020  1.005 - 1.035 Final    POC Blood, Urine 10/17/2024 NEGATIVE  NEGATIVE Final    POC PH, Urine 10/17/2024 7.0  No Reference Range Established PH Final    POC Protein, Urine 10/17/2024 NEGATIVE  NEGATIVE, 30 (1+) mg/dl Final    POC Urobilinogen, Urine 10/17/2024 0.2  0.2, 1.0 EU/DL Final    Poc Nitrite, Urine 10/17/2024 NEGATIVE  NEGATIVE Final    POC Leukocytes, Urine 10/17/2024 NEGATIVE  NEGATIVE Final    Sarah Score 10/17/2024 1  0 - 3 Final    Yeast 10/17/2024 ABSENT  ABSENT Final    Clue Cells 10/17/2024 ABSENT  ABSENT Final   Admission on 08/20/2024, Discharged on 08/20/2024   Component Date Value Ref Range Status    Coronavirus 2019, PCR 08/20/2024 Not Detected  Not Detected Final     SCRATCH SKIN TESTING:  No allergies to dust mites, molds, pollens, cats, or dogs.    FOOD SCRATCH SKIN TESTING:  No allergies to EGG, COW'S MILK, SOY, WHEAT, CODFISH, SHRIMP, WALNUT, AND PEANUT. Negative to chocolate and tomato.  But + control low;   The histamine control was low limiting the ability to detect low positive allergy results.        Assessment/Plan   Diagnoses and all orders for this visit:  Chronic rhinitis  Food sensitivity with gastrointestinal symptoms  -     famotidine (Pepcid) 40 mg tablet; Take 1 tablet (40 mg) by mouth once daily.  Gastroesophageal reflux disease without esophagitis  -     famotidine (Pepcid) 40 mg tablet; Take 1 tablet (40 mg) by mouth once daily.    We performed allergy testing to help determine the etiology of your symptoms. We discussed the results of the testing. Also, we started to focus on treating your problem and we reviewed the management plan.    The allergy testing was negative to common environmental allergy triggers. This means that IgE-mediated allergy is not causing your symptoms.    Gerd - continue omeprazole  Add famotidine as this is a type 2 antihistamine that can help your symptoms.     If not better, please follow-up and will check some additional blood tests.      Wiliam Sher MD

## 2025-01-24 ENCOUNTER — LAB (OUTPATIENT)
Dept: LAB | Facility: LAB | Age: 25
End: 2025-01-24
Payer: MEDICAID

## 2025-01-24 ENCOUNTER — HOSPITAL ENCOUNTER (OUTPATIENT)
Dept: RADIOLOGY | Facility: HOSPITAL | Age: 25
Discharge: HOME | End: 2025-01-24
Payer: MEDICAID

## 2025-01-24 ENCOUNTER — TELEMEDICINE (OUTPATIENT)
Dept: UROLOGY | Facility: CLINIC | Age: 25
End: 2025-01-24
Payer: MEDICAID

## 2025-01-24 ENCOUNTER — OFFICE VISIT (OUTPATIENT)
Facility: CLINIC | Age: 25
End: 2025-01-24
Payer: MEDICAID

## 2025-01-24 VITALS
WEIGHT: 152 LBS | SYSTOLIC BLOOD PRESSURE: 115 MMHG | DIASTOLIC BLOOD PRESSURE: 75 MMHG | HEIGHT: 67 IN | BODY MASS INDEX: 23.86 KG/M2

## 2025-01-24 DIAGNOSIS — R39.9 UTI SYMPTOMS: ICD-10-CM

## 2025-01-24 DIAGNOSIS — R35.0 URINARY FREQUENCY: ICD-10-CM

## 2025-01-24 DIAGNOSIS — N91.1 AMENORRHEA, SECONDARY: ICD-10-CM

## 2025-01-24 DIAGNOSIS — N30.00 ACUTE CYSTITIS WITHOUT HEMATURIA: Primary | ICD-10-CM

## 2025-01-24 DIAGNOSIS — Z11.3 ROUTINE SCREENING FOR STI (SEXUALLY TRANSMITTED INFECTION): Primary | ICD-10-CM

## 2025-01-24 DIAGNOSIS — R30.0 DYSURIA: ICD-10-CM

## 2025-01-24 DIAGNOSIS — K59.00 CONSTIPATION, UNSPECIFIED CONSTIPATION TYPE: ICD-10-CM

## 2025-01-24 DIAGNOSIS — R10.2 PELVIC PAIN: ICD-10-CM

## 2025-01-24 LAB
APPEARANCE UR: CLEAR
BILIRUB UR STRIP.AUTO-MCNC: NEGATIVE MG/DL
COLOR UR: NORMAL
GLUCOSE UR STRIP.AUTO-MCNC: NORMAL MG/DL
HCG UR QL IA.RAPID: NEGATIVE
KETONES UR STRIP.AUTO-MCNC: NEGATIVE MG/DL
LEUKOCYTE ESTERASE UR QL STRIP.AUTO: NEGATIVE
NITRITE UR QL STRIP.AUTO: NEGATIVE
PH UR STRIP.AUTO: 6.5 [PH]
PROT UR STRIP.AUTO-MCNC: NEGATIVE MG/DL
RBC # UR STRIP.AUTO: NEGATIVE /UL
SP GR UR STRIP.AUTO: 1
UROBILINOGEN UR STRIP.AUTO-MCNC: NORMAL MG/DL

## 2025-01-24 PROCEDURE — 99213 OFFICE O/P EST LOW 20 MIN: CPT | Performed by: STUDENT IN AN ORGANIZED HEALTH CARE EDUCATION/TRAINING PROGRAM

## 2025-01-24 PROCEDURE — 76856 US EXAM PELVIC COMPLETE: CPT

## 2025-01-24 PROCEDURE — 3008F BODY MASS INDEX DOCD: CPT | Performed by: STUDENT IN AN ORGANIZED HEALTH CARE EDUCATION/TRAINING PROGRAM

## 2025-01-24 PROCEDURE — 81003 URINALYSIS AUTO W/O SCOPE: CPT

## 2025-01-24 PROCEDURE — 1036F TOBACCO NON-USER: CPT | Performed by: STUDENT IN AN ORGANIZED HEALTH CARE EDUCATION/TRAINING PROGRAM

## 2025-01-24 PROCEDURE — 81025 URINE PREGNANCY TEST: CPT

## 2025-01-24 RX ORDER — SULFAMETHOXAZOLE AND TRIMETHOPRIM 800; 160 MG/1; MG/1
1 TABLET ORAL 2 TIMES DAILY
Qty: 10 TABLET | Refills: 0 | Status: SHIPPED | OUTPATIENT
Start: 2025-01-24 | End: 2025-01-29

## 2025-01-24 RX ORDER — TRAZODONE HYDROCHLORIDE 50 MG/1
TABLET ORAL
COMMUNITY
Start: 2024-11-14

## 2025-01-24 ASSESSMENT — PATIENT HEALTH QUESTIONNAIRE - PHQ9
SUM OF ALL RESPONSES TO PHQ9 QUESTIONS 1 & 2: 2
1. LITTLE INTEREST OR PLEASURE IN DOING THINGS: SEVERAL DAYS
2. FEELING DOWN, DEPRESSED OR HOPELESS: SEVERAL DAYS
10. IF YOU CHECKED OFF ANY PROBLEMS, HOW DIFFICULT HAVE THESE PROBLEMS MADE IT FOR YOU TO DO YOUR WORK, TAKE CARE OF THINGS AT HOME, OR GET ALONG WITH OTHER PEOPLE: SOMEWHAT DIFFICULT

## 2025-01-24 ASSESSMENT — PAIN SCALES - GENERAL: PAINLEVEL_OUTOF10: 3

## 2025-01-24 NOTE — PROGRESS NOTES
01/24/25   [id number]    Dysuria, vaginal pain; UTI symptoms;    Virtual or Telephone Consent    A telephone visit (audio only) between the patient (at the originating site) and the provider (at the distant site) was utilized to provide this telehealth service.   Verbal consent was requested and obtained from Tracey Villa on this date, 01/24/25 for a telehealth visit.        Subjective      HPI Tracey Villa is a 24 y.o. female who presents for dysuria, vaginal pain; constipated past week; seeing GI in few weeks for the constipation; tried mag citrate, miraLAX, dulcolax and nothing helping; primary care told her to increase fiber and fluids; adding whole grains to dinner;     Taking the gemtesa and flomax, amitriptyline;     No hematuria, no fever, dysuria feels like UTI and feels r/t to the constipation this week;     PMH, PSH, FH, SH reviewed.    Objective     LMP  (LMP Unknown) Comment: Slynd birth control = no menses and no chance of pregnancy  per patient.   Physical Exam  Deferred for phone visit.  Assessment/Plan   Problem List Items Addressed This Visit    None  Visit Diagnoses       Acute cystitis without hematuria    -  Primary    Relevant Medications    sulfamethoxazole-trimethoprim (Bactrim DS) 800-160 mg tablet    Dysuria        UTI symptoms        Constipation, unspecified constipation type              No orders of the defined types were placed in this encounter.     Drop urine at  lab for micro/culture  Macrobid sent empirically with weekend upon us  Will discuss culture results Monday  Disucussed if f/c/n/v go to [facility] or ER         TEODORO Regalado-CNP  Lab Results   Component Value Date    GLUCOSE 78 04/05/2024    CALCIUM 9.6 04/05/2024     04/05/2024    K 4.3 04/05/2024    CO2 26 04/05/2024     04/05/2024    BUN 19 04/05/2024    CREATININE 0.89 04/05/2024

## 2025-01-24 NOTE — PROGRESS NOTES
Subjective   Tracey Villa is a 24 y.o.  who presents today for amenorrhea and pelvic pain.    Has been on Slyhd for 2-3 years. Prior to Slynd, was on OCP and had really heavy periods, which was why she went on OCPs in general. Painful menses on OCPS, switched to Slynd. When she started Slynd, bleeding slowed and then stopped.     Family history of ovarian and uterine cancer so they were worried. Tracey had genetic testing for ovarian cancer and has met with a . All tested family members including tracey were BRCA negative.     Has seen a PFPT, was painful more than helpful. Has done trigger point injections with Dr Hernandez. Planning to get more trigger point injections. Pelvic pain on LLQ is constant, some days more noticeable than others. Worse with movement. Sometimes radiates to lower back.     Not currently sexually active.     ObHx: G0  PMH: vaginismus, interstitial cystitis, POTs, anxiety/depression, ADHD, PTSD, h/o HTN  GynHx: last pap 2024 NILM    Objective   Physical Exam  Vitals:    25 1329   BP: 115/75      Gen: awake, alert  Head: NCAT  HEENT: moist mucus membranes  Pulm: breathing comfortably on room air  CV: warm and well-perfused  Abd: soft non tender to palpation throughout, non distended  Neuro: alert and oriented  Psych: appropriate affect     Assessment/Plan     Tracey Villa is a 24 y.o.  who presents today for secondary amenorrhea.    - Discussed secondary amenorrhea can be normal side effect of slynd due to prolonged progestin exposure  - Discussed that there is not a medical indication to have menses, but if she would prefer to have cycles can discuss alternative options. Pt considering, but overall happy with Slynd currently  - Pelvic pain: pelvic US from this AM pending, has follow up scheduled for chronic management of pelvic pain for March. Will follow up pelvic US to rule out acute pathology, suspect related to vaginismus, pelvic floor  dysfunction. Shared decision making to defer pelvic exam today  - STI screening today, urine hcg to rule out pregnancy as cause of secondary amenorrhea, low suspicion    FUV 6 months    Zenaida Norton MD

## 2025-01-30 ENCOUNTER — OFFICE VISIT (OUTPATIENT)
Dept: UROLOGY | Facility: CLINIC | Age: 25
End: 2025-01-30
Payer: MEDICAID

## 2025-01-30 VITALS
WEIGHT: 153 LBS | TEMPERATURE: 98.2 F | SYSTOLIC BLOOD PRESSURE: 113 MMHG | DIASTOLIC BLOOD PRESSURE: 77 MMHG | HEART RATE: 94 BPM | BODY MASS INDEX: 23.96 KG/M2

## 2025-01-30 DIAGNOSIS — L29.2 VULVAR ITCHING: Primary | ICD-10-CM

## 2025-01-30 DIAGNOSIS — N32.89 BLADDER SPASM: ICD-10-CM

## 2025-01-30 DIAGNOSIS — N30.10 IC (INTERSTITIAL CYSTITIS): ICD-10-CM

## 2025-01-30 DIAGNOSIS — R35.0 URINARY FREQUENCY: ICD-10-CM

## 2025-01-30 PROCEDURE — 51700 IRRIGATION OF BLADDER: CPT | Performed by: NURSE PRACTITIONER

## 2025-01-30 PROCEDURE — 99213 OFFICE O/P EST LOW 20 MIN: CPT | Performed by: NURSE PRACTITIONER

## 2025-01-30 RX ORDER — TRIAMCINOLONE ACETONIDE 40 MG/ML
40 INJECTION, SUSPENSION INTRA-ARTICULAR; INTRAMUSCULAR ONCE
Status: COMPLETED | OUTPATIENT
Start: 2025-01-30 | End: 2025-01-30

## 2025-01-30 RX ORDER — INDOMETHACIN 25 MG/1
5 CAPSULE ORAL ONCE
Status: COMPLETED | OUTPATIENT
Start: 2025-01-30 | End: 2025-01-30

## 2025-01-30 RX ORDER — GENTAMICIN 40 MG/ML
80 INJECTION, SOLUTION INTRAMUSCULAR; INTRAVENOUS ONCE
Status: COMPLETED | OUTPATIENT
Start: 2025-01-30 | End: 2025-01-30

## 2025-01-30 RX ORDER — CLOTRIMAZOLE AND BETAMETHASONE DIPROPIONATE 10; .64 MG/G; MG/G
1 CREAM TOPICAL 2 TIMES DAILY
Qty: 15 G | Refills: 1 | Status: SHIPPED | OUTPATIENT
Start: 2025-01-30 | End: 2025-02-27

## 2025-01-30 RX ORDER — LIDOCAINE HYDROCHLORIDE 10 MG/ML
20 INJECTION, SOLUTION INFILTRATION; PERINEURAL ONCE
Status: COMPLETED | OUTPATIENT
Start: 2025-01-30 | End: 2025-01-30

## 2025-01-30 RX ADMIN — LIDOCAINE HYDROCHLORIDE 20 ML: 10 INJECTION, SOLUTION INFILTRATION; PERINEURAL at 15:30

## 2025-01-30 RX ADMIN — GENTAMICIN 80 MG: 40 INJECTION, SOLUTION INTRAMUSCULAR; INTRAVENOUS at 15:31

## 2025-01-30 RX ADMIN — TRIAMCINOLONE ACETONIDE 40 MG: 40 INJECTION, SUSPENSION INTRA-ARTICULAR; INTRAMUSCULAR at 15:32

## 2025-01-30 RX ADMIN — INDOMETHACIN 5 MEQ: 25 CAPSULE ORAL at 15:31

## 2025-01-30 ASSESSMENT — PAIN SCALES - GENERAL: PAINLEVEL_OUTOF10: 0 - NO PAIN

## 2025-01-30 NOTE — Clinical Note
January 30, 2025     Patient: Tracey Villa   YOB: 2000   Date of Visit: 1/30/2025       To Whom It May Concern:    It is my medical opinion that Tracey Villa {Work release (duty restriction):[id number]}.    If you have any questions or concerns, please don't hesitate to call.         Sincerely,        Karlee Dasilva, TEODORO-CNP    CC: No Recipients

## 2025-01-30 NOTE — PROGRESS NOTES
01/30/25   [id number]    Bladder instillation, vulvar itching     Subjective      HPI Tracey Villa is a 24 y.o. female who presents for bladder instillation;     Bladder instillation today, feels this treatment helps w flare ups, all cultures have been negative, no longer in PFPT, didn't feel helpful; in counseling now; working towards EMDR; discussed options, will try to get a small dilator to give to patient; until then will start with lotrisone for vulvar itching and then switch to vaseline to help with desensitizing so that she can use dilator and maybe intimate helen to help with pelvic floor tone;     Recap from previous visit  On 0.8 mg tamsulosin and amitriptyline 40 mg, Gemtesa 75 mg, tilt table + POTS; feels tamsulosin works really well; otherwise urination difficult; no dizziness from tamsulosin;     PMH, PSH, FH, SH reviewed    Objective     /77   Pulse 94   Temp 36.8 °C (98.2 °F) (Temporal)   Wt 69.4 kg (153 lb)   LMP  (LMP Unknown) Comment: Slynd birth control = no menses and no chance of pregnancy  per patient.  BMI 23.96 kg/m²    Physical Exam  General: Appears comfortable and in no apparent distress, well nourished  Head: Normocephalic, atraumatic  Neck: trachea midline  Respiratory: respirations unlabored, no wheezes, and no use of accessory muscles  Cardiovascular: at rest no dyspnea, well perfused  Skin: no visible rashes or lesions  Neurologic: grossly intact, oriented to person, place, and time  Psychiatric: mood and affect appropriate  Musculoskeletal: in chair for appt. no difficulty w upper body movement     no vulvar lesions, no erythema;     Assessment/Plan   Problem List Items Addressed This Visit    None        Orders Placed This Encounter   Procedures    Bladder Catheterization     This order was created via procedure documentation           Plan:  She will work on pelvic floor relaxation, dilator once we get her one from another office;  Uribel if needed  Bladder  instillation monthly    We discussed hydrodistention, can't remember if helpful per patient;     Has been manageable, 60-70% better until recent flare up    Nurse line [phone]        Patient ID: Tracey Villa is a 24 y.o. female.    Bladder Catheterization    Date/Time: 1/30/2025 12:02 PM    Performed by: SANTOS Regalado  Authorized by: SANTOS Regalado    Procedure Details    Procedure: bladder irrigation      Catheter size: 14 Fr    Complexity: simple      Urine characteristics: clear    Post-Procedure Details     Outcome: patient tolerated procedure well with no complications      Additional Details      IC cocktail lidocaine, trimacinolone, gentamicin, bicarb          Karlee L Dasilva, APRN-CNP  Lab Results   Component Value Date    GLUCOSE 78 04/05/2024    CALCIUM 9.6 04/05/2024     04/05/2024    K 4.3 04/05/2024    CO2 26 04/05/2024     04/05/2024    BUN 19 04/05/2024    CREATININE 0.89 04/05/2024     Prelief [employer]  Tamsulosin, can take two for a bit then go back to one  Gemtesa daily  Amitriptyline has increased to 40 mg  Has appt. For next instillation in August  Nurse line [phone]

## 2025-01-30 NOTE — LETTER
January 30, 2025     Patient: Tracey Villa   YOB: 2000   Date of Visit: 1/30/2025       To Whom It May Concern:    Tracey Villa was seen in my clinic on 1/30/2025 at 11:20 am. Please excuse Tracey for her absence from school on this day to make the appointment.    If you have any questions or concerns, please don't hesitate to call.         Sincerely,         TEODORO Regalado-CNP        CC: No Recipients

## 2025-02-03 DIAGNOSIS — N30.10 INTERSTITIAL CYSTITIS: ICD-10-CM

## 2025-02-03 RX ORDER — HYDROXYZINE HYDROCHLORIDE 25 MG/1
25 TABLET, FILM COATED ORAL NIGHTLY
Qty: 90 TABLET | Refills: 3 | Status: SHIPPED | OUTPATIENT
Start: 2025-02-03 | End: 2026-02-03

## 2025-02-05 NOTE — PROGRESS NOTES
History Of Present Illness  Tracey Villa is a 24 y.o. female presenting to GI clinic with a chief complaint of constipation, abdominal pain, and bloating      No chief complaint on file.          Social History  She reports that she has never smoked. She has never used smokeless tobacco. She reports that she does not currently use alcohol. She reports that she does not use drugs.  She {NSAIDS (Optional):52640} on a regular basis    Family History  Family History   Problem Relation Name Age of Onset    Other (anxiety and depression) Mother Guadalupe     Hyperlipidemia Mother Guadalupe     Cancer Mother Guadalupe     Depression Mother Guadalupe     Genetic Testing Mother Guadalupe     Hypertension Mother Guadalupe     Mental illness Mother Guadalupe     Alcohol abuse Maternal Grandfather Jaro     Heart disease Maternal Grandfather Jaro     Hypertension Maternal Grandfather Jaro     Mental illness Maternal Grandfather Jaro     Stroke Maternal Grandfather Jaro     Alcohol abuse Maternal Grandmother Nina     Hypertension Maternal Grandmother Nina     Mental illness Maternal Grandmother Nina     Stroke Maternal Grandmother Nina     Alcohol abuse Mother's Sister CherylAnne     Alcohol abuse Mother's Brother Jaro     Alcohol abuse Mother's Sister Queenie     Vision loss Mother's Sister Queenie     Depression Sister Sophitia     Mental illness Sister Sophitia     Drug abuse Father's Brother Antolin      The patient {Action; does/does not:79863} have a FH of CRC. she {Action; does/does not:88495} have a FH of IBD    Review of Systems      Physical Exam     Last Vital Signs  LMP  (LMP Unknown) Comment: Slynd birth control = no menses and no chance of pregnancy  per patient.     Relevant Results  Lab Results   Component Value Date    WBC 3.7 (L) 04/05/2024    HGB 13.4 04/05/2024    HCT 41.3 04/05/2024    MCV 94 04/05/2024     04/05/2024      Lab Results   Component Value Date    GLUCOSE 78 04/05/2024    CALCIUM 9.6 04/05/2024      "04/05/2024    K 4.3 04/05/2024    CO2 26 04/05/2024     04/05/2024    BUN 19 04/05/2024    CREATININE 0.89 04/05/2024      Lab Results   Component Value Date    ALT 17 04/05/2024    AST 17 04/05/2024    ALKPHOS 81 04/05/2024    BILITOT 0.6 04/05/2024    No results found for: \"IRON\", \"TIBC\", \"IRONSAT\", \"FERRITIN\", \"FHSYCCUL37\", \"FOLATE\"  No results found for: \"CALPS\", \"CRP\" No results found for: \"LIPASE\"   Lab Results   Component Value Date    HGBA1C 5.3 05/31/2023        EGD/COLONOSCOPY/COLOGUARD  EGD       Colonoscopy         PERTINENT IMAGING  Xray abdomen 1/16/2025- IMPRESSION:  1.  Nonobstructive bowel gas pattern.    US abdomen 8/22/2022- IMPRESSION:  Unremarkable ultrasound of the abdomen.    Assessment/Plan    Assessment & Plan           TEODORO Jasmine-CNP  02/05/25   "

## 2025-02-10 ENCOUNTER — OFFICE VISIT (OUTPATIENT)
Dept: PRIMARY CARE | Facility: CLINIC | Age: 25
End: 2025-02-10
Payer: MEDICAID

## 2025-02-10 ENCOUNTER — APPOINTMENT (OUTPATIENT)
Dept: GASTROENTEROLOGY | Facility: CLINIC | Age: 25
End: 2025-02-10
Payer: MEDICAID

## 2025-02-10 VITALS
SYSTOLIC BLOOD PRESSURE: 122 MMHG | OXYGEN SATURATION: 98 % | BODY MASS INDEX: 23.49 KG/M2 | WEIGHT: 150 LBS | TEMPERATURE: 99.8 F | HEART RATE: 133 BPM | DIASTOLIC BLOOD PRESSURE: 74 MMHG

## 2025-02-10 DIAGNOSIS — R50.9 FEVER, UNSPECIFIED FEVER CAUSE: Primary | ICD-10-CM

## 2025-02-10 LAB
POC RAPID INFLUENZA A: POSITIVE
POC RAPID INFLUENZA B: NEGATIVE

## 2025-02-10 PROCEDURE — 1036F TOBACCO NON-USER: CPT

## 2025-02-10 PROCEDURE — 87804 INFLUENZA ASSAY W/OPTIC: CPT

## 2025-02-10 PROCEDURE — 99213 OFFICE O/P EST LOW 20 MIN: CPT

## 2025-02-10 RX ORDER — OSELTAMIVIR PHOSPHATE 75 MG/1
75 CAPSULE ORAL 2 TIMES DAILY
Qty: 10 CAPSULE | Refills: 0 | Status: SHIPPED | OUTPATIENT
Start: 2025-02-10 | End: 2025-02-15

## 2025-02-10 NOTE — PROGRESS NOTES
Subjective   Patient ID: Tracey Villa is a 24 y.o. female who presents for Chills (hot) and Cough (Chills, body aches last 4 days. ).  HPI  - started with chills friday night followed by body aches  - then started with cough   - sore throat  - R ear pain   - now with tightness and pain in chest, hurts to cough  -some nausea, some mild vomiting. No diarrhea.  - poor appetite  - drinking well  - peeing normal   - discomfort in chest when she lays down   - didnt take her beta blocker today - hr is 122   - oxygen is 98%      Current Outpatient Medications:     amitriptyline (Elavil) 10 mg tablet, Take 4 tablets (40 mg) by mouth once daily at bedtime., Disp: 360 tablet, Rfl: 2    atogepant 10 mg tablet, Take 10 mg by mouth once daily., Disp: , Rfl:     clotrimazole-betamethasone (Lotrisone) cream, Apply 1 Application topically 2 times a day for 28 days. Twice daily to vulva for irritation x 2 weeks apply with finger, then 2-3 x per week, Disp: 15 g, Rfl: 1    cyclobenzaprine (Flexeril) 5 mg tablet, Take 1-2 tablets (5-10 mg) by mouth every 8 hours if needed for muscle spasms., Disp: 90 tablet, Rfl: 2    diazePAM (Valium) 5 mg tablet, Take 1 tablet (5 mg) by mouth as needed at bedtime. Insert one pill vaginally as needed for pain, Disp: , Rfl:     drospirenone, contraceptive, (Slynd) 4 mg (28) tablet, Take 1 tablet by mouth once daily., Disp: 90 tablet, Rfl: 3    famotidine (Pepcid) 40 mg tablet, Take 1 tablet (40 mg) by mouth once daily., Disp: 30 tablet, Rfl: 11    fremanezumab (Ajovy Autoinjector) 225 mg/1.5 mL auto-injector, Inject 1 Pen (225 mg) under the skin every 28 (twenty-eight) days., Disp: 1 each, Rfl: 11    gabapentin (Neurontin) 100 mg capsule, Take 1 capsule (100 mg) by mouth 3 times a day., Disp: 90 capsule, Rfl: 11    Gemtesa 75 mg tablet, Take 1 tablet (75 mg) by mouth once daily., Disp: 90 tablet, Rfl: 3    hydrOXYzine HCL (Atarax) 25 mg tablet, Take 1 tablet (25 mg) by mouth once daily at  bedtime., Disp: 90 tablet, Rfl: 3    methen-m.blue-s.phos-phsal-hyo (UribeL) 118-10-40.8-36 mg capsule, Take 1 capsule by mouth once daily as needed (bladder spasms, urethral pain)., Disp: 30 capsule, Rfl: 3    omeprazole (PriLOSEC) 40 mg DR capsule, TAKE 1 CAPSULE BY MOUTH ONCE DAILY IN THE MORNING BEFORE MEAL(S), Disp: 90 capsule, Rfl: 0    ondansetron ODT (Zofran-ODT) 4 mg disintegrating tablet, Take 1 tablet (4 mg) by mouth every 6 hours if needed for nausea., Disp: 20 tablet, Rfl: 11    propranolol (Inderal) 40 mg tablet, Take 1 tablet (40 mg) by mouth 2 times a day., Disp: 60 tablet, Rfl: 5    tamsulosin (Flomax) 0.4 mg 24 hr capsule, Take 2 capsules (0.8 mg) by mouth once daily at bedtime., Disp: 180 capsule, Rfl: 3    traZODone (Desyrel) 50 mg tablet, , Disp: , Rfl:     triamcinolone (Kenalog) 0.1 % cream, Apply topically 2 times a day. Apply to affected area 1-2 times daily as needed. Avoid face and groin., Disp: 30 g, Rfl: 0    ubrogepant (Ubrelvy) 100 mg tablet tablet, Take 1 tablet (100 mg) by mouth if needed (Take one tablet at onset of migraine. If still present 2 hrs later, can take a second tablet.)., Disp: 16 tablet, Rfl: 3    EPINEPHrine (Epipen) 0.3 mg/0.3 mL injection syringe, Inject 0.3 mL (0.3 mg) into the muscle if needed for anaphylaxis for up to 1 day. Call 911 after use., Disp: 2 each, Rfl: 2   Past Surgical History:   Procedure Laterality Date    COLPOSCOPY  09/15/24    OTHER SURGICAL HISTORY  02/20/2020    No history of surgery      Past Medical History:   Diagnosis Date    Acute upper respiratory infection, unspecified 02/09/2018    Acute upper respiratory infection    Anemia     Contact with and (suspected) exposure to other viral communicable diseases 12/22/2014    Exposure to influenza    Depression 2021    Pediculosis due to pediculus humanus capitis 12/03/2015    Lice infested hair    Personal history of diseases of the blood and blood-forming organs and certain disorders involving  the immune mechanism 08/09/2019    History of iron deficiency anemia    Personal history of other diseases of the respiratory system 12/22/2014    History of pharyngitis    Personal history of other diseases of the respiratory system 02/09/2018    History of sore throat    Personal history of other specified conditions 12/22/2014    History of fever    Personal history of other specified conditions 11/27/2019    History of epistaxis    Urinary tract infection      Social History     Tobacco Use    Smoking status: Never    Smokeless tobacco: Never   Vaping Use    Vaping status: Never Used   Substance Use Topics    Alcohol use: Not Currently    Drug use: Never      Family History   Problem Relation Name Age of Onset    Other (anxiety and depression) Mother Guadalupe     Hyperlipidemia Mother Guadalupe     Cancer Mother Guadalupe     Depression Mother Guadalupe     Genetic Testing Mother Guadalupe     Hypertension Mother Guadalupe     Mental illness Mother Guadalupe     Alcohol abuse Maternal Grandfather Jaro     Heart disease Maternal Grandfather Jaro     Hypertension Maternal Grandfather Jaro     Mental illness Maternal Grandfather Jaro     Stroke Maternal Grandfather Jaro     Alcohol abuse Maternal Grandmother Nina     Hypertension Maternal Grandmother Nina     Mental illness Maternal Grandmother Nina     Stroke Maternal Grandmother Nina     Alcohol abuse Mother's Sister CherylAnne     Alcohol abuse Mother's Brother Jaro     Alcohol abuse Mother's Sister Queenie     Vision loss Mother's Sister Queenie     Depression Sister Sophitia     Mental illness Sister Sophitia     Drug abuse Father's Brother Antolin       Review of Systems  10 point ROS negative except as otherwise noted in the HPI.      Objective   /74   Pulse (!) 133   Temp 37.7 °C (99.8 °F)   Wt 68 kg (150 lb)   LMP  (LMP Unknown) Comment: Slynd birth control = no menses and no chance of pregnancy  per patient.  SpO2 98%   BMI 23.49 kg/m²    Physical Exam  Constitutional:        General: She is not in acute distress.     Appearance: Normal appearance. She is not ill-appearing or toxic-appearing.   HENT:      Head: Normocephalic and atraumatic.      Right Ear: Tympanic membrane, ear canal and external ear normal.      Left Ear: Tympanic membrane, ear canal and external ear normal.      Nose: Nose normal. No congestion or rhinorrhea.      Mouth/Throat:      Mouth: Mucous membranes are moist.      Pharynx: Oropharynx is clear. No oropharyngeal exudate or posterior oropharyngeal erythema.   Eyes:      Conjunctiva/sclera: Conjunctivae normal.      Pupils: Pupils are equal, round, and reactive to light.   Neck:      Vascular: No carotid bruit.   Cardiovascular:      Rate and Rhythm: Normal rate and regular rhythm.      Pulses: Normal pulses.      Heart sounds: Normal heart sounds. No murmur heard.  Pulmonary:      Effort: Pulmonary effort is normal.      Breath sounds: Normal breath sounds. No wheezing, rhonchi or rales.   Abdominal:      General: Bowel sounds are normal. There is no distension.      Palpations: Abdomen is soft. There is no mass.      Tenderness: There is no abdominal tenderness. There is no guarding or rebound.   Musculoskeletal:         General: Normal range of motion.      Cervical back: Normal range of motion and neck supple. No tenderness.      Right lower leg: No edema.      Left lower leg: No edema.   Lymphadenopathy:      Cervical: No cervical adenopathy.   Skin:     General: Skin is warm and dry.      Findings: No rash.   Neurological:      Mental Status: She is alert and oriented to person, place, and time.   Psychiatric:         Mood and Affect: Mood normal.         Behavior: Behavior normal.           Assessment/Plan   Problem List Items Addressed This Visit    None  Visit Diagnoses       Fever, unspecified fever cause    -  Primary  - Pt with cough, chills, low grade fever, body aches since Friday evening  - flu A positive  - start tamiflu   - ER precautions and  supportive care recs given     Relevant Orders    POCT Influenza A/B manually resulted (Completed)            Discussed at visit any disease processes that were of concern as well as the risks, benefits and instructions on any new medication provided. Patient (and/or caretaker of patient if present) stated all questions were answered, and they voiced understanding of instructions.     Bibiana Silva PA-C

## 2025-02-10 NOTE — LETTER
February 10, 2025     Patient: Tracey Villa   YOB: 2000   Date of Visit: 2/10/2025       To Whom It May Concern:    Tracey Villa was seen in my clinic on 2/10/2025 at 1:30 pm. Please excuse Tracey for her absence from work on this day to make the appointment. May return to work and school on Thursday 2/13/25 as long as she is feeling improved.    If you have any questions or concerns, please don't hesitate to call.         Sincerely,         Bibiana Silva PA-C        CC: No Recipients

## 2025-02-10 NOTE — PATIENT INSTRUCTIONS
Start tamiflu    INFORMATION ABOUT INFLUENZA:     Symptoms usually start suddenly and include high fever, body aches, extreme fatigue,  coughing that may even sound like croup,  nasal congestion, and sometimes nausea and vomiting.      An antibiotic will not treat the flu,  but we may give an ANTIVIRAL medication if you are high risk for complications, and it can be started in the beginning of the illness.      This is a very contagious virus.  It spreads  by  coughing and sneezing.   You are considered contagious until you have not had a fever for at least 48 hours.        You need to rest and drink plenty of fluids.    Steam and saline nasal spray often may help.  For children over age 6 and adults,  Mucinex might help loosen the phlegm.    You may take Tylenol as needed - up to every 4 hours.  You could also take Ibuprofen as directed and as needed, but this could upset your stomach.    Do not give Ibuprofen to children under 6 months of age.       The illness usually lasts  7 - 10 days.   Usually people are starting to feel better after day 5.       Some people may get a secondary infection from this illness.   If this were to happen, usually, you are starting to improve and then you worsen again.    You could develop and ear infection, sinus infection or pneumonia,  so if you develop trouble breathing, facial pain and pressure or ear pain, I need to hear from you, or make a follow-up appointment.       IF SEVERE SHORTNESS OF BREATH, chest pain, swelling or pain in legs  - PROCEED TO THE ER.   (CONSIDER CALLING 911)     If you do end up  taking an  antibiotic, start either eating yogurt daily or taking a probiotic like acidophilus or culturelle to help prevent diarrhea or yeast infections.

## 2025-02-17 ENCOUNTER — PROCEDURE VISIT (OUTPATIENT)
Dept: PSYCHOLOGY | Facility: HOSPITAL | Age: 25
End: 2025-02-17
Payer: MEDICAID

## 2025-02-17 DIAGNOSIS — R41.9 COGNITIVE COMPLAINTS: ICD-10-CM

## 2025-02-17 DIAGNOSIS — R41.89 BRAIN FOG: Primary | ICD-10-CM

## 2025-02-17 DIAGNOSIS — G90.A POTS (POSTURAL ORTHOSTATIC TACHYCARDIA SYNDROME): ICD-10-CM

## 2025-02-17 DIAGNOSIS — F43.10 PTSD (POST-TRAUMATIC STRESS DISORDER): ICD-10-CM

## 2025-02-17 DIAGNOSIS — F90.0 ADHD, PREDOMINANTLY INATTENTIVE TYPE: ICD-10-CM

## 2025-02-17 DIAGNOSIS — F41.9 ANXIETY: ICD-10-CM

## 2025-02-17 DIAGNOSIS — G44.321 INTRACTABLE CHRONIC POST-TRAUMATIC HEADACHE: ICD-10-CM

## 2025-02-17 DIAGNOSIS — F32.A DEPRESSION, UNSPECIFIED DEPRESSION TYPE: ICD-10-CM

## 2025-02-17 DIAGNOSIS — Z87.898 HISTORY OF LEARNING DISABILITY: ICD-10-CM

## 2025-02-17 PROCEDURE — 96116 NUBHVL XM PHYS/QHP 1ST HR: CPT | Mod: AH,GC | Performed by: CLINICAL NEUROPSYCHOLOGIST

## 2025-02-17 PROCEDURE — 96132 NRPSYC TST EVAL PHYS/QHP 1ST: CPT | Mod: AH,GC | Performed by: CLINICAL NEUROPSYCHOLOGIST

## 2025-02-17 PROCEDURE — 96133 NRPSYC TST EVAL PHYS/QHP EA: CPT | Mod: AH,GC | Performed by: CLINICAL NEUROPSYCHOLOGIST

## 2025-02-17 PROCEDURE — 96139 PSYCL/NRPSYC TST TECH EA: CPT | Mod: AH,GC | Performed by: CLINICAL NEUROPSYCHOLOGIST

## 2025-02-17 PROCEDURE — 96138 PSYCL/NRPSYC TECH 1ST: CPT | Mod: AH,GC | Performed by: CLINICAL NEUROPSYCHOLOGIST
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
## 2025-02-17 NOTE — PROGRESS NOTES
NEUROPSYCHOLOGICAL DATA SUMMARY    Name: Tracey Villa  : 2000  MRN: [id number]    Date of Service: 25    Age: 24 y.o.  Race: White  Education: 15  Preferred Hand: RH  Examiner: Devaughn Hope, PhD  Psychometrist: Nathalie Whiet    Descriptors:    T-Score Standard Score Z-Score Scaled Score %ile Rank   Exceptionally High > 70 > 130 > 2.0 > 16 > 98   Above Average 64-69 120-129 1.4-1.9 15 91-97   High Average 57-63 110-119 0.7-1.3 12-14 75-90   Average 43-56  0.6 to -0.6 8-11 25-74   Low Average 37-42 80-89 -1.3 to -0.7 6-7 9-24   Below Average 30-36 70-79 -2.0 to -1.4 4-5 2-8   Exceptionally Low < 30 < 70 < -2.0 < 4 < 2     WTAR 1 Raw  Std. Sc  %ile   Total Correct  24  85  16   FSIQ-Est  -  93  32     Wechsler Adult Intelligence Scale - IV 1   Percep Std-equiv  %ile   Matrix Reasoning 95  37   Wrking Std-equiv  %ile   Digit Span 65  1   Letter Number Sequencing 80  9   ProcSpd Std-equiv  %ile   Symbol Search 95  37   Coding 100  50   Quotient Index  %ile   Working Memory Index 69  2   Processing Speed index 97  42      Raw  Std-equiv  %ile   Trail Making 2 Part A 30.4 (0) 82  12       Time (Errors) Part B 81.6 (0) 78  7   COWA  Phonemic 2 26  73  4                 Semantic  15  79  8     HVLT-R 1 (Form 1)       Raw  Std-equi  %ile   Trial 1 7  92  30   Trial 2 5  55  0.13   Trial 3 6  <55  <0.13   Total Recall  18  60  0.38   Learning  -1  -  -   Trial 4 (Delayed Recall) 9  85  16   % Retained  128%  144  99.83   T+  12  See Discrim. Index   F+ 0  See Discrim. Index   Discrimination Index  12  112  79     BVMT-R 1(Form 1)       Raw  Std-equi  %ile   Trial 1 2  61  <1   Trial 2 7  75  4   Trial 3 10  88  21   Total Recall  19  69  2   Learning  8  139  >99   Delayed Recall  9  82  12   % Retained  90%  -  >16   T+  (F+) 6(0)  See Discrim. Index   Discrimination Index  6  -  >16   Copy Score (Optional) 12          Raw       TOMM 1 43  50  --   Dots 1 16  7 v. 6  3e   RDS 1 6   RDS-R  9        Raw  T  %ile   BDI-II 1              Total 26       STAI 1                                              State 36  50  59     Trait 57  72  95     Post Concussive Scale    (Pre) 37(15)                                           (Post) 54(17)       Test Normative References:  Test Manual  LENNY Zapata., LENNY Zapata, & [employer] (2004). Revised comprehensive norms for an expanded [place] battery: Demographically adjusted neuropsychological norms for  and  adults, professional manual. Estefani Fla: Psychological Assessment Resources

## 2025-02-17 NOTE — PROGRESS NOTES
History Of Present Illness  Tracey Villa is a 24 y.o. female presenting to GI clinic with a chief complaint of constipation, abdominal pain, and bloating.  She is here with her mother, Guadalupe.    Patient complains of left upper quadrant abdominal pain, constipation and bloating. She states she moves her bowels only once or twice weekly.  Stool can be BSS 6-7, sometimes 3.  She states her stools are black, sometimes very hard and sticky. She does not take iron supplementation. She has tried multiple over-the-counter medications including MiraLAX, Dulcolax, and mag citrate to have bowel movements.  She notes that constipation has been an issue for a few years now.  She has lower abdominal cramping before bowel movements, relieved with bowel movements.    She states recently she has been having burning anal pain and BRBPR with bowel movements.  She also has intermittent anal spasms.    She is on amitriptyline for pelvic pain, Currently takes 40 mg daily.  Notes that her psychiatrist tried increased dose to 50 mg however this made her too sleepy  Social History  She reports that she has never smoked. She has never used smokeless tobacco. She reports current alcohol use. She reports that she does not use drugs.  She does not take NSAIDs on a regular basis    Family History  Family History   Problem Relation Name Age of Onset    Other (anxiety and depression) Mother Guadalupe     Hyperlipidemia Mother Guadalupe     Cancer Mother Guadalupe     Depression Mother Guadalupe     Genetic Testing Mother Guadalupe     Hypertension Mother Guadalupe     Mental illness Mother Guadalupe     Asthma Mother Guadalupe     Alcohol abuse Maternal Grandfather Jaro     Heart disease Maternal Grandfather Jaro     Hypertension Maternal Grandfather Jaro     Mental illness Maternal Grandfather Jaro     Stroke Maternal Grandfather Jaro     Alcohol abuse Maternal Grandmother Nina     Hypertension Maternal Grandmother Nina     Mental illness Maternal Grandmother Nina      Stroke Maternal Grandmother Nina     Alcohol abuse Mother's Sister Epi     Alcohol abuse Mother's Brother Alesha     Alcohol abuse Mother's Sister Queenie     Vision loss Mother's Sister Queenie     Depression Sister Sophitia     Mental illness Sister Sophitia     Asthma Sister Sophitia     Drug abuse Father's Brother Antolin      The patient does have a FH of CRC- grandfather. she does not have a FH of IBD    Review of Systems   Gastrointestinal:  Positive for abdominal distention, abdominal pain, anal bleeding, blood in stool, constipation, nausea and rectal pain.        See HPI   All other systems reviewed and are negative.        Physical Exam  Constitutional:       General: She is not in acute distress.     Appearance: Normal appearance. She is not ill-appearing.   HENT:      Head: Normocephalic and atraumatic.      Mouth/Throat:      Mouth: Mucous membranes are moist.   Eyes:      General: No scleral icterus.     Conjunctiva/sclera: Conjunctivae normal.      Pupils: Pupils are equal, round, and reactive to light.   Pulmonary:      Effort: Pulmonary effort is normal.   Abdominal:      General: There is no distension.      Palpations: Abdomen is soft. There is no mass.      Tenderness: There is no abdominal tenderness.      Hernia: No hernia is present.   Genitourinary:     Comments: anterior external skin tag with tenderness to palpation, however no overt fissure. Anal excoriation present. Internal exam deferred due to fissure presentation.  Musculoskeletal:         General: Normal range of motion.      Cervical back: Normal range of motion.   Skin:     General: Skin is warm and dry.      Coloration: Skin is not jaundiced or pale.   Neurological:      Mental Status: She is alert. Mental status is at baseline.   Psychiatric:         Mood and Affect: Mood normal.         Behavior: Behavior normal.          Last Vital Signs  /64 (BP Location: Left arm, Patient Position: Sitting, BP Cuff Size: Adult)    "Pulse 78   Resp 18   Ht 1.702 m (5' 7\")   Wt 69.6 kg (153 lb 6.4 oz)   SpO2 100%   BMI 24.03 kg/m²      Relevant Results  Lab Results   Component Value Date    WBC 3.7 (L) 04/05/2024    HGB 13.4 04/05/2024    HCT 41.3 04/05/2024    MCV 94 04/05/2024     04/05/2024      Lab Results   Component Value Date    GLUCOSE 78 04/05/2024    CALCIUM 9.6 04/05/2024     04/05/2024    K 4.3 04/05/2024    CO2 26 04/05/2024     04/05/2024    BUN 19 04/05/2024    CREATININE 0.89 04/05/2024      Lab Results   Component Value Date    ALT 17 04/05/2024    AST 17 04/05/2024    ALKPHOS 81 04/05/2024    BILITOT 0.6 04/05/2024    No results found for: \"IRON\", \"TIBC\", \"IRONSAT\", \"FERRITIN\", \"INRRZBVZ02\", \"FOLATE\"  No results found for: \"CALPS\", \"CRP\" No results found for: \"LIPASE\"   Lab Results   Component Value Date    HGBA1C 5.3 05/31/2023      Hx POTS, PTSD    Completed allergy testing with Dr. Sher 1/22/2025- FOOD SCRATCH SKIN TESTING:  No allergies to EGG, COW'S MILK, SOY, WHEAT, CODFISH, SHRIMP, WALNUT, AND PEANUT. Negative to chocolate and tomato.  But + control low;   The histamine control was low limiting the ability to detect low positive allergy results.  EGD/COLONOSCOPY/COLOGUARD  EGD- never     Colonoscopy-  never        PERTINENT IMAGING  Xray abdomen 1/16/2025- IMPRESSION:  1.  Nonobstructive bowel gas pattern.     US abdomen 8/22/2022- IMPRESSION:  Unremarkable ultrasound of the abdomen.    Assessment/Plan    Assessment & Plan  Black tarry stools  Reports of black tarry, sticky stools, however no reports of diarrhea with this  Continue PPI  Check CBC, CMP  EGD to rule out gastric ulcer  Orders:    Esophagogastroduodenoscopy (EGD); Future    polyethylene glycol-electrolytes (Nulytely Lemon-Lime) 420 gram solution; Take 4,000 mL by mouth see administration instructions. Take 2000 ml the night before and 2000 ml the morning of your colonoscopy    CBC; Future    Comprehensive Metabolic Panel; " Future    BRBPR (bright red blood per rectum)  New BRBPR, although presentation consistent with anal fissure, need to rule out other causes of BRBPR including IBD, polyp/mass  Colonoscopy, given patient's chronic constipation will order Nulytely prep  Trial Anusol twice daily x 10 days  Orders:    Colonoscopy Diagnostic; Future    polyethylene glycol-electrolytes (Nulytely Lemon-Lime) 420 gram solution; Take 4,000 mL by mouth see administration instructions. Take 2000 ml the night before and 2000 ml the morning of your colonoscopy    CBC; Future    Comprehensive Metabolic Panel; Future    hydrocortisone (Anusol-HC) 2.5 % rectal cream; Insert into the rectum 2 times a day for 10 days.    Irritable bowel syndrome with constipation  Constipation and abdominal cramping relieved with bowel movements, will not have bowel movement for 1 to 2 weeks at a time, normally moves her bowels 1-2 times weekly with little relief after taking stimulant laxatives, MiraLAX  Will start Linzess 290 mcg daily for IBS-C symptoms  Consider pelvic floor dysfunction given urology hx  Orders:    linaCLOtide (Linzess) 290 mcg capsule; Take 1 capsule (290 mcg) by mouth once daily in the morning. Take before meals. Do not crush or chew.    polyethylene glycol-electrolytes (Nulytely Lemon-Lime) 420 gram solution; Take 4,000 mL by mouth see administration instructions. Take 2000 ml the night before and 2000 ml the morning of your colonoscopy    Bloating  May be related to patient's chronic constipation, consider H. pylori infection, SIBO  Started on Linzess daily  Orders:    Colonoscopy Diagnostic; Future    Esophagogastroduodenoscopy (EGD); Future    polyethylene glycol-electrolytes (Nulytely Lemon-Lime) 420 gram solution; Take 4,000 mL by mouth see administration instructions. Take 2000 ml the night before and 2000 ml the morning of your colonoscopy    Follow-up after testing is completed      TEODORO Jasmine-CNP  02/18/25

## 2025-02-18 ENCOUNTER — APPOINTMENT (OUTPATIENT)
Dept: GASTROENTEROLOGY | Facility: CLINIC | Age: 25
End: 2025-02-18
Payer: MEDICAID

## 2025-02-18 VITALS
DIASTOLIC BLOOD PRESSURE: 64 MMHG | HEIGHT: 67 IN | SYSTOLIC BLOOD PRESSURE: 107 MMHG | OXYGEN SATURATION: 100 % | WEIGHT: 153.4 LBS | HEART RATE: 78 BPM | BODY MASS INDEX: 24.08 KG/M2 | RESPIRATION RATE: 18 BRPM

## 2025-02-18 DIAGNOSIS — K58.1 IRRITABLE BOWEL SYNDROME WITH CONSTIPATION: ICD-10-CM

## 2025-02-18 DIAGNOSIS — R14.0 BLOATING: ICD-10-CM

## 2025-02-18 DIAGNOSIS — K92.1 BLACK TARRY STOOLS: Primary | ICD-10-CM

## 2025-02-18 DIAGNOSIS — K62.5 BRBPR (BRIGHT RED BLOOD PER RECTUM): ICD-10-CM

## 2025-02-18 PROBLEM — D50.9 IRON DEFICIENCY ANEMIA: Status: ACTIVE | Noted: 2025-02-18

## 2025-02-18 PROBLEM — M54.12 CERVICAL RADICULOPATHY: Status: ACTIVE | Noted: 2025-02-18

## 2025-02-18 PROCEDURE — 1036F TOBACCO NON-USER: CPT

## 2025-02-18 PROCEDURE — 3008F BODY MASS INDEX DOCD: CPT

## 2025-02-18 PROCEDURE — 99204 OFFICE O/P NEW MOD 45 MIN: CPT

## 2025-02-18 RX ORDER — POLYETHYLENE GLYCOL 3350, SODIUM CHLORIDE, SODIUM BICARBONATE, POTASSIUM CHLORIDE 420; 11.2; 5.72; 1.48 G/4L; G/4L; G/4L; G/4L
4000 POWDER, FOR SOLUTION ORAL SEE ADMIN INSTRUCTIONS
Qty: 4000 ML | Refills: 0 | Status: SHIPPED | OUTPATIENT
Start: 2025-02-18

## 2025-02-18 RX ORDER — HYDROCORTISONE 25 MG/G
CREAM TOPICAL 2 TIMES DAILY
Qty: 28 G | Refills: 1 | Status: SHIPPED | OUTPATIENT
Start: 2025-02-18 | End: 2025-02-28

## 2025-02-18 ASSESSMENT — PAIN SCALES - GENERAL: PAINLEVEL_OUTOF10: 2

## 2025-02-18 ASSESSMENT — ENCOUNTER SYMPTOMS
CONSTIPATION: 1
NAUSEA: 1
RECTAL PAIN: 1
ROS GI COMMENTS: SEE HPI
ANAL BLEEDING: 1
BLOOD IN STOOL: 1
ABDOMINAL PAIN: 1
ABDOMINAL DISTENTION: 1

## 2025-02-18 NOTE — PATIENT INSTRUCTIONS
EGD/Colonoscopy  Start Linzess 290 mcg once daily 30 mins before breakfast  Use hydrocortisone cream twice daily for 10 days  Blood work ordered    LINZESS is a once-daily pill that treats chronic idiopathic constipation (CIC) and multiple symptoms of IBS-C. It helps you have more frequent and complete bowel movements and helps relieve constipation and overall abdominal symptoms (belly pain, discomfort, and bloating) associated with IBS-C.    For LINZESS to work best, take it every day. This is different from over-the-counter laxatives, which are often taken as needed.    When LINZESS is taken daily, constipation relief is typically felt in about 1 week. IBS-C patients may begin to experience relief of belly pain and overall abdominal symptoms (pain, discomfort, and bloating) in about 1 week, with symptoms typically improving over 12 weeks.    Take LINZESS at least 30 minutes before your first meal at about the same time of day.  If you have trouble swallowing capsules, open the LINZESS capsule and sprinkle all of the beads onto applesauce or into 1 oz. (30mL) of water.    If you miss a dose, take your dose as usual the following day. Do not take 2 doses at the same time to make up for a missed dose.    Diarrhea is a common side effect and often begins within the first 2 weeks of LINZESS treatment. This normally improves with continued daily use.   Go to the nearest emergency room right away if you develop unusual or severe abdominal pain, severe diarrhea, severe dehydration, or if you have bright red, bloody stools or black stools that look like tar.     Follow up in 3-4 months

## 2025-03-03 ASSESSMENT — DERMATOLOGY QUALITY OF LIFE (QOL) ASSESSMENT
RATE HOW BOTHERED YOU ARE BY EFFECTS OF YOUR SKIN PROBLEMS ON YOUR ACTIVITIES (EG, GOING OUT, ACCOMPLISHING WHAT YOU WANT, WORK ACTIVITIES OR YOUR RELATIONSHIPS WITH OTHERS): 2
WHAT SINGLE SKIN CONDITION LISTED BELOW IS THE PATIENT ANSWERING THE QUALITY-OF-LIFE ASSESSMENT QUESTIONS ABOUT: NONE OF THE ABOVE
RATE HOW EMOTIONALLY BOTHERED YOU ARE BY YOUR SKIN PROBLEM (FOR EXAMPLE, WORRY, EMBARRASSMENT, FRUSTRATION): 2
RATE HOW BOTHERED YOU ARE BY SYMPTOMS OF YOUR SKIN PROBLEM (EG, ITCHING, STINGING BURNING, HURTING OR SKIN IRRITATION): 6 - ALWAYS BOTHERED
RATE HOW EMOTIONALLY BOTHERED YOU ARE BY YOUR SKIN PROBLEM (FOR EXAMPLE, WORRY, EMBARRASSMENT, FRUSTRATION): 2
RATE HOW BOTHERED YOU ARE BY EFFECTS OF YOUR SKIN PROBLEMS ON YOUR ACTIVITIES (EG, GOING OUT, ACCOMPLISHING WHAT YOU WANT, WORK ACTIVITIES OR YOUR RELATIONSHIPS WITH OTHERS): 2
RATE HOW BOTHERED YOU ARE BY SYMPTOMS OF YOUR SKIN PROBLEM (EG, ITCHING, STINGING BURNING, HURTING OR SKIN IRRITATION): 6 - ALWAYS BOTHERED
DATE THE QUALITY-OF-LIFE ASSESSMENT WAS COMPLETED: 67267
WHAT SINGLE SKIN CONDITION LISTED BELOW IS THE PATIENT ANSWERING THE QUALITY-OF-LIFE ASSESSMENT QUESTIONS ABOUT: NONE OF THE ABOVE

## 2025-03-03 ASSESSMENT — PATIENT GLOBAL ASSESSMENT (PGA): WHAT IS THE PGA: PATIENT GLOBAL ASSESSMENT:  2 - MILD

## 2025-03-04 ENCOUNTER — APPOINTMENT (OUTPATIENT)
Dept: DERMATOLOGY | Facility: CLINIC | Age: 25
End: 2025-03-04
Payer: MEDICAID

## 2025-03-04 DIAGNOSIS — L29.9 PRURITUS: ICD-10-CM

## 2025-03-04 DIAGNOSIS — L21.9 SEBORRHEIC DERMATITIS: Primary | ICD-10-CM

## 2025-03-04 PROCEDURE — 99204 OFFICE O/P NEW MOD 45 MIN: CPT | Performed by: STUDENT IN AN ORGANIZED HEALTH CARE EDUCATION/TRAINING PROGRAM

## 2025-03-04 RX ORDER — PRAMOXINE HYDROCHLORIDE 10 MG/ML
1 LOTION TOPICAL EVERY 4 HOURS PRN
Qty: 118 ML | Refills: 11 | Status: SHIPPED | OUTPATIENT
Start: 2025-03-04

## 2025-03-04 RX ORDER — FLUOCINONIDE TOPICAL SOLUTION USP, 0.05% 0.5 MG/ML
SOLUTION TOPICAL
Qty: 60 ML | Refills: 3 | Status: SHIPPED | OUTPATIENT
Start: 2025-03-04

## 2025-03-04 RX ORDER — KETOCONAZOLE 20 MG/ML
SHAMPOO, SUSPENSION TOPICAL
Qty: 120 ML | Refills: 11 | Status: SHIPPED | OUTPATIENT
Start: 2025-03-04

## 2025-03-04 ASSESSMENT — ITCH NUMERIC RATING SCALE: HOW SEVERE IS YOUR ITCHING?: 4

## 2025-03-04 ASSESSMENT — DERMATOLOGY QUALITY OF LIFE (QOL) ASSESSMENT
RATE HOW BOTHERED YOU ARE BY EFFECTS OF YOUR SKIN PROBLEMS ON YOUR ACTIVITIES (EG, GOING OUT, ACCOMPLISHING WHAT YOU WANT, WORK ACTIVITIES OR YOUR RELATIONSHIPS WITH OTHERS): 0 - NEVER BOTHERED
RATE HOW BOTHERED YOU ARE BY SYMPTOMS OF YOUR SKIN PROBLEM (EG, ITCHING, STINGING BURNING, HURTING OR SKIN IRRITATION): 0 - NEVER BOTHERED
DATE THE QUALITY-OF-LIFE ASSESSMENT WAS COMPLETED: 67268
ARE THERE EXCLUSIONS OR EXCEPTIONS FOR THE QUALITY OF LIFE ASSESSMENT: NO
RATE HOW EMOTIONALLY BOTHERED YOU ARE BY YOUR SKIN PROBLEM (FOR EXAMPLE, WORRY, EMBARRASSMENT, FRUSTRATION): 0 - NEVER BOTHERED

## 2025-03-04 ASSESSMENT — DERMATOLOGY PATIENT ASSESSMENT: DO YOU HAVE ANY NEW OR CHANGING LESIONS: NO

## 2025-03-04 ASSESSMENT — PATIENT GLOBAL ASSESSMENT (PGA): PATIENT GLOBAL ASSESSMENT: PATIENT GLOBAL ASSESSMENT:  1 - CLEAR
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
## 2025-03-06 ENCOUNTER — APPOINTMENT (OUTPATIENT)
Dept: UROLOGY | Facility: CLINIC | Age: 25
End: 2025-03-06
Payer: MEDICAID

## 2025-03-10 DIAGNOSIS — L56.8 PHOTOSENSITIVITY: Primary | ICD-10-CM

## 2025-03-11 NOTE — PROGRESS NOTES
FOLLOW-UP VISIT       HISTORY OF PRESENT ILLNESS:   Tracey Villa is a 24 y.o. female who presents to me today for abdominal and pelvic pain. Patient was last seen by Karlee Dasilva on 1/30/25 for bladder instillations which helps with flare ups.     Patient reports she is no longer in PFPT due to this not providing benefit. Also reports having vaginal pain near her labia. Notes she would like to have repeated trigger point and pelvic floor Botox injections.     PAST MEDICAL HISTORY:  Past Medical History:   Diagnosis Date    Acute upper respiratory infection, unspecified 02/09/2018    Acute upper respiratory infection    Anemia     Contact with and (suspected) exposure to other viral communicable diseases 12/22/2014    Exposure to influenza    Depression 2021    Food intolerance 2022    GERD (gastroesophageal reflux disease)     Pediculosis due to pediculus humanus capitis 12/03/2015    Lice infested hair    Personal history of diseases of the blood and blood-forming organs and certain disorders involving the immune mechanism 08/09/2019    History of iron deficiency anemia    Personal history of other diseases of the respiratory system 12/22/2014    History of pharyngitis    Personal history of other diseases of the respiratory system 02/09/2018    History of sore throat    Personal history of other specified conditions 12/22/2014    History of fever    Personal history of other specified conditions 11/27/2019    History of epistaxis    Pneumonia     Urinary tract infection     Urticaria        PAST SURGICAL HISTORY:  Past Surgical History:   Procedure Laterality Date    COLPOSCOPY  09/15/24    OTHER SURGICAL HISTORY  02/20/2020    No history of surgery        ALLERGIES:   Allergies   Allergen Reactions    Pyridium [Phenazopyridine] Other    Latex Hives        MEDICATIONS:   Medication Documentation Review Audit       Reviewed by Jany Mccrary MA (Medical Assistant) on 03/12/25 at 1402      Medication  Order Taking? Sig Documenting Provider Last Dose Status   amitriptyline (Elavil) 10 mg tablet [id number]  Take 4 tablets (40 mg) by mouth once daily at bedtime. Bibiana Silva PA-C  Active   atogepant 10 mg tablet [id number]  Take 10 mg by mouth once daily. Historical Provider, MD  Active   cyclobenzaprine (Flexeril) 5 mg tablet [id number]  Take 1-2 tablets (5-10 mg) by mouth every 8 hours if needed for muscle spasms. Nohemy Rice MD  Active   diazePAM (Valium) 5 mg tablet [id number]  Take 1 tablet (5 mg) by mouth as needed at bedtime. Insert one pill vaginally as needed for pain Historical Provider, MD  Active   drospirenone, contraceptive, (Slynd) 4 mg (28) tablet [id number]  Take 1 tablet by mouth once daily. SANTOS Westbrook  Active   EPINEPHrine (Epipen) 0.3 mg/0.3 mL injection syringe [id number] No Inject 0.3 mL (0.3 mg) into the muscle if needed for anaphylaxis for up to 1 day. Call 911 after use. Nohemy Rice MD Taking  25 0335   famotidine (Pepcid) 40 mg tablet [id number]  Take 1 tablet (40 mg) by mouth once daily.   Patient not taking: Reported on 2025    Wiliam Sher MD  Active   fluocinonide (Lidex) 0.05 % external solution [id number]  Use once a day to the affected area on the scalp. Use as needed. Avoid face and groin. My Mary, DO  Active   fremanezumab (Ajovy Autoinjector) 225 mg/1.5 mL auto-injector [id number]  Inject 1 Pen (225 mg) under the skin every 28 (twenty-eight) days. Bibiana Silva PA-C  Active   gabapentin (Neurontin) 100 mg capsule [id number]  Take 1 capsule (100 mg) by mouth 3 times a day. Bibiana Silva PA-C  Active   Gemtesa 75 mg tablet [id number]  Take 1 tablet (75 mg) by mouth once daily. SANTOS Regalado  Active   hydrocortisone (Anusol-HC) 2.5 % rectal cream [id number]  Insert into the rectum 2 times a day for 10 days. TEODORO Jasmine-CNP   25 1834   hydrOXYzine HCL (Atarax) 25 mg tablet [id number]   Take 1 tablet (25 mg) by mouth once daily at bedtime. SANTOS Regalado  Active   ketoconazole (NIZOral) 2 % shampoo [id number]  Use 2-3x times a week. Leave on for 5 minutes before rinsing off. My Mary, DO  Active   linaCLOtide (Linzess) 290 mcg capsule [id number]  Take 1 capsule (290 mcg) by mouth once daily in the morning. Take before meals. Do not crush or chew. SANTOS Jasmine  Active   methen-m.blue-s.phos-phsal-hyo (UribeL) 118-10-40.8-36 mg capsule [id number]  Take 1 capsule by mouth once daily as needed (bladder spasms, urethral pain). SANTOS Regalado  Active   omeprazole (PriLOSEC) 40 mg DR capsule [id number]  TAKE 1 CAPSULE BY MOUTH ONCE DAILY IN THE MORNING BEFORE MEAL(S) Bibiana Silva PA-C  Active   ondansetron ODT (Zofran-ODT) 4 mg disintegrating tablet [id number]  Take 1 tablet (4 mg) by mouth every 6 hours if needed for nausea. Bibiana Silva PA-C  Active   polyethylene glycol-electrolytes (Nulytely Lemon-Lime) 420 gram solution [id number]  Take 4,000 mL by mouth see administration instructions. Take 2000 ml the night before and 2000 ml the morning of your colonoscopy SANTOS Jasmine  Active   pramoxine (Sarna Sensitive) 1 % lotion [id number]  Apply 1 Application topically every 4 hours if needed (Use as needed on the neck and back for itch). My Mary, DO  Active   propranolol (Inderal) 40 mg tablet [id number]  Take 1 tablet (40 mg) by mouth 2 times a day. Bibiana Silva PA-C   25 2354   tamsulosin (Flomax) 0.4 mg 24 hr capsule [id number]  Take 2 capsules (0.8 mg) by mouth once daily at bedtime. SANTOS Regalado  Active   traZODone (Desyrel) 50 mg tablet [id number]   Historical Provider, MD  Active   triamcinolone (Kenalog) 0.1 % cream [id number]  Apply topically 2 times a day. Apply to affected area 1-2 times daily as needed. Avoid face and groin. Rika Young PA-C  Active   ubrogepant (Ubrelvy) 100 mg tablet tablet [id number]  Take 1 tablet (100 mg) by  mouth if needed (Take one tablet at onset of migraine. If still present 2 hrs later, can take a second tablet.). Bibiana Sivla PA-C  Active                     SOCIAL HISTORY:  Patient  reports that she has never smoked. She has never used smokeless tobacco. She reports current alcohol use. She reports that she does not use drugs.   Social History     Socioeconomic History    Marital status: Single     Spouse name: Not on file    Number of children: Not on file    Years of education: Not on file    Highest education level: Not on file   Occupational History    Not on file   Tobacco Use    Smoking status: Never    Smokeless tobacco: Never   Vaping Use    Vaping status: Never Used   Substance and Sexual Activity    Alcohol use: Yes     Comment: Social    Drug use: Never    Sexual activity: Not Currently     Partners: Male     Birth control/protection: Condom Male, Emergency Contraception   Other Topics Concern    Not on file   Social History Narrative    Not on file     Social Drivers of Health     Financial Resource Strain: Not on file   Food Insecurity: Not on file   Transportation Needs: Not on file   Physical Activity: Not on file   Stress: Not on file   Social Connections: Not on file   Intimate Partner Violence: Not on file   Housing Stability: Not on file       FAMILY HISTORY:  Family History   Problem Relation Name Age of Onset    Other (anxiety and depression) Mother Guadalupe     Hyperlipidemia Mother Guadalupe     Cancer Mother Guadalupe     Depression Mother Guadalupe     Genetic Testing Mother Guadalupe     Hypertension Mother Guadalupe     Mental illness Mother Guadalupe     Asthma Mother Guadalupe     Depression Sister Sophitia     Mental illness Sister Sophitia     Asthma Sister Sophitia     Alcohol abuse Mother's Sister CherylAnne     Alcohol abuse Mother's Sister Queenie     Vision loss Mother's Sister Queenie     Alcohol abuse Mother's Brother Jaro     Drug abuse Father's Brother Antolin     Alcohol abuse Maternal Grandmother Nina      Hypertension Maternal Grandmother Nina     Mental illness Maternal Grandmother Nina     Stroke Maternal Grandmother Nina     Alcohol abuse Maternal Grandfather Jaro     Heart disease Maternal Grandfather Jaro     Hypertension Maternal Grandfather Jaro     Mental illness Maternal Grandfather Fano     Stroke Maternal Grandfather Fano     Colon cancer Maternal Grandfather Fano 50 - 59       REVIEW OF SYSTEMS:  Constitutional: Negative for fever and chills. Denies anorexia, weight loss.  Eyes: Negative for visual disturbance.   Respiratory: Negative for shortness of breath.    Cardiovascular: Negative for chest pain.   Gastrointestinal: Negative for nausea and vomiting.   Genitourinary: See interval history above.  Skin: Negative for rash.   Neurological: Negative for dizziness and numbness.   Psychiatric/Behavioral: Negative for confusion and decreased concentration.     PHYSICAL EXAM:  Blood pressure 117/72, pulse 87, temperature 37.1 °C (98.8 °F), resp. rate 17, weight 71.7 kg (158 lb), SpO2 98%.  Constitutional: Patient appears well-developed and well-nourished. No distress.    Head: Normocephalic and atraumatic.    Neck: Normal range of motion.    Cardiovascular: Normal rate.    Pulmonary/Chest: Effort normal. No respiratory distress.   : Tenderness at the vestibule and hyperemia present. No pudendal nerve tenderness.   Musculoskeletal: Normal range of motion.    Neurological: Alert and oriented to person, place, and time.  Psychiatric: Normal mood and affect. Behavior is normal. Thought content normal.       Assessment:      1. Pelvic pain            Tracey Villa is a 24 y.o. female with pelvic pain.      Plan:   Prescription for estradiol 0.01% vaginal cream sent to the patient's pharmacy.    Patient will be scheduled for pelvic floor trigger point and Botox injections and cysto with fulgaration or hydrodistension at her earliest convenience.      Karen Hernandez MD MPH      Scribe Attestation  By  signing my name below, I, Artur Boykin   attest that this documentation has been prepared under the direction and in the presence of Karen Hernandez MD MPH.

## 2025-03-12 ENCOUNTER — OFFICE VISIT (OUTPATIENT)
Dept: UROLOGY | Facility: HOSPITAL | Age: 25
End: 2025-03-12
Payer: MEDICAID

## 2025-03-12 VITALS
HEART RATE: 87 BPM | RESPIRATION RATE: 17 BRPM | TEMPERATURE: 98.8 F | BODY MASS INDEX: 24.75 KG/M2 | DIASTOLIC BLOOD PRESSURE: 72 MMHG | WEIGHT: 158 LBS | OXYGEN SATURATION: 98 % | SYSTOLIC BLOOD PRESSURE: 117 MMHG

## 2025-03-12 DIAGNOSIS — Z01.818 PRE-OP TESTING: ICD-10-CM

## 2025-03-12 DIAGNOSIS — N30.10 INTERSTITIAL CYSTITIS: ICD-10-CM

## 2025-03-12 DIAGNOSIS — R10.2 PELVIC PAIN: ICD-10-CM

## 2025-03-12 PROCEDURE — 99214 OFFICE O/P EST MOD 30 MIN: CPT | Performed by: OBSTETRICS & GYNECOLOGY

## 2025-03-13 ENCOUNTER — APPOINTMENT (OUTPATIENT)
Dept: OPHTHALMOLOGY | Facility: CLINIC | Age: 25
End: 2025-03-13
Payer: MEDICAID

## 2025-03-19 ENCOUNTER — OFFICE VISIT (OUTPATIENT)
Dept: CARDIOLOGY | Facility: CLINIC | Age: 25
End: 2025-03-19
Payer: MEDICAID

## 2025-03-19 VITALS
SYSTOLIC BLOOD PRESSURE: 105 MMHG | OXYGEN SATURATION: 99 % | BODY MASS INDEX: 24.96 KG/M2 | DIASTOLIC BLOOD PRESSURE: 70 MMHG | HEIGHT: 67 IN | HEART RATE: 76 BPM | WEIGHT: 159 LBS

## 2025-03-19 DIAGNOSIS — G90.A POTS (POSTURAL ORTHOSTATIC TACHYCARDIA SYNDROME): ICD-10-CM

## 2025-03-19 DIAGNOSIS — E78.01 HETEROZYGOUS FAMILIAL HYPERCHOLESTEROLEMIA: Primary | ICD-10-CM

## 2025-03-19 DIAGNOSIS — E78.5 HYPERLIPIDEMIA, UNSPECIFIED HYPERLIPIDEMIA TYPE: ICD-10-CM

## 2025-03-19 PROCEDURE — 3008F BODY MASS INDEX DOCD: CPT | Performed by: INTERNAL MEDICINE

## 2025-03-19 PROCEDURE — 99214 OFFICE O/P EST MOD 30 MIN: CPT | Performed by: INTERNAL MEDICINE

## 2025-03-19 PROCEDURE — 1036F TOBACCO NON-USER: CPT | Performed by: INTERNAL MEDICINE

## 2025-03-19 RX ORDER — ATORVASTATIN CALCIUM 80 MG/1
80 TABLET, FILM COATED ORAL DAILY
Qty: 90 TABLET | Refills: 3 | Status: SHIPPED | OUTPATIENT
Start: 2025-03-19 | End: 2026-03-19

## 2025-03-19 ASSESSMENT — ENCOUNTER SYMPTOMS
NEUROLOGICAL NEGATIVE: 1
ENDOCRINE NEGATIVE: 1
GASTROINTESTINAL NEGATIVE: 1
CONSTITUTIONAL NEGATIVE: 1
ALLERGIC/IMMUNOLOGIC NEGATIVE: 1
MUSCULOSKELETAL NEGATIVE: 1
HEMATOLOGIC/LYMPHATIC NEGATIVE: 1
CARDIOVASCULAR NEGATIVE: 1
RESPIRATORY NEGATIVE: 1
PSYCHIATRIC NEGATIVE: 1
EYES NEGATIVE: 1

## 2025-03-19 ASSESSMENT — PAIN SCALES - GENERAL: PAINLEVEL_OUTOF10: 4

## 2025-03-19 NOTE — PROGRESS NOTES
Preventive Cardiology Clinic Note    Reason for Visit: Follow up visit  Referring Clinician: No ref. provider found     History of Present Illness: Tracey Villa is a 24 y.o. female with severe hypercholesterolemia and genetically confirmed heterozygous familial hypercholesterolemia due to a mutation in the LDL receptor who is here for a follow-up visit.  Since her last visit she was told to stop her atorvastatin when she started sumatriptan for migraines.  However she is no longer taking this medication and so she would like to restart her statin therapy.  She does not report any exertional chest pain, shortness of breath, palpitations, or syncope.  She was diagnosed with a possible mild form of postural orthostatic tachycardia syndrome by tilt table test a mildly exaggerated heart rate response by tilt table in June 2024 and she saw several neurologists for this diagnosis.  Her symptoms include swelling of the ankles and dizziness.  There is no association with her symptoms with heart rate and she is not tachycardic today in the office either sitting or standing.      Past Medical History:  She has a past medical history of Acute upper respiratory infection, unspecified (02/09/2018), Anemia, Contact with and (suspected) exposure to other viral communicable diseases (12/22/2014), Depression (2021), Food intolerance (2022), GERD (gastroesophageal reflux disease), Pediculosis due to pediculus humanus capitis (12/03/2015), Personal history of diseases of the blood and blood-forming organs and certain disorders involving the immune mechanism (08/09/2019), Personal history of other diseases of the respiratory system (12/22/2014), Personal history of other diseases of the respiratory system (02/09/2018), Personal history of other specified conditions (12/22/2014), Personal history of other specified conditions (11/27/2019), Pneumonia, Urinary tract infection, and Urticaria.    Past Surgical History:  She has a  past surgical history that includes Other surgical history (02/20/2020) and Colposcopy (09/15/24).    Social History:  She reports that she has never smoked. She has never used smokeless tobacco. She reports current alcohol use. She reports that she does not use drugs.    Family History:  Family History   Problem Relation Name Age of Onset    Other (anxiety and depression) Mother Guadalupe     Hyperlipidemia Mother Guadalupe     Cancer Mother Guadalupe     Depression Mother Guadalupe     Genetic Testing Mother Guadalupe     Hypertension Mother Guadalupe     Mental illness Mother Guadalupe     Asthma Mother Guadalupe     Depression Sister Sophitia     Mental illness Sister Sophitia     Asthma Sister Sophitia     Alcohol abuse Mother's Sister CherylAnne     Alcohol abuse Mother's Sister Queenie     Vision loss Mother's Sister Queenie     Alcohol abuse Mother's Brother Jaro     Drug abuse Father's Brother Antolin     Alcohol abuse Maternal Grandmother Nina     Hypertension Maternal Grandmother Nina     Mental illness Maternal Grandmother Nina     Stroke Maternal Grandmother Nina     Alcohol abuse Maternal Grandfather Jaro     Heart disease Maternal Grandfather Jaro     Hypertension Maternal Grandfather Jaro     Mental illness Maternal Grandfather Jaro     Stroke Maternal Grandfather Jaro     Colon cancer Maternal Grandfather Jaro 50 - 59       Allergies:  Pyridium [phenazopyridine] and Latex    Outpatient Medications:  Current Outpatient Medications   Medication Instructions    Ajovy Autoinjector 225 mg, subcutaneous, Every 28 days    amitriptyline (ELAVIL) 40 mg, oral, Nightly    atogepant 10 mg, Daily    cyclobenzaprine (FLEXERIL) 5-10 mg, oral, Every 8 hours PRN    diazePAM (VALIUM) 5 mg, Nightly PRN    drospirenone, contraceptive, (Slynd) 4 mg (28) tablet 1 tablet, oral, Daily    EPINEPHrine (EPIPEN) 0.3 mg, intramuscular, As needed, Call 911 after use.    famotidine (PEPCID) 40 mg, oral, Daily    fluocinonide (Lidex) 0.05 % external solution Use  once a day to the affected area on the scalp. Use as needed. Avoid face and groin.    gabapentin (NEURONTIN) 100 mg, oral, 3 times daily    Gemtesa 75 mg, oral, Daily    hydrocortisone (Anusol-HC) 2.5 % rectal cream rectal, 2 times daily    hydrOXYzine HCL (ATARAX) 25 mg, oral, Nightly    ketoconazole (NIZOral) 2 % shampoo Use 2-3x times a week. Leave on for 5 minutes before rinsing off.    linaCLOtide (LINZESS) 290 mcg, oral, Daily before breakfast, Do not crush or chew.    methen-m.blue-s.phos-phsal-hyo (UribeL) 118-10-40.8-36 mg capsule 1 capsule, oral, Daily PRN    omeprazole (PRILOSEC) 40 mg, oral, Daily before breakfast    ondansetron ODT (ZOFRAN-ODT) 4 mg, oral, Every 6 hours PRN    polyethylene glycol-electrolytes (Nulytely Lemon-Lime) 420 gram solution 4,000 mL, oral, See admin instructions, Take 2000 ml the night before and 2000 ml the morning of your colonoscopy    pramoxine (Sarna Sensitive) 1 % lotion 1 Application, Topical, Every 4 hours PRN    propranolol (INDERAL) 40 mg, oral, 2 times daily    tamsulosin (FLOMAX) 0.8 mg, oral, Nightly    traZODone (Desyrel) 50 mg tablet Take 3 tablets (150 mg) by mouth as needed at bedtime for sleep.    triamcinolone (Kenalog) 0.1 % cream Topical, 2 times daily, Apply to affected area 1-2 times daily as needed. Avoid face and groin.    ubrogepant (UBRELVY) 100 mg, oral, As needed       Review of Systems:  Review of Systems   Constitutional: Negative.   HENT: Negative.     Eyes: Negative.    Cardiovascular: Negative.    Respiratory: Negative.     Endocrine: Negative.    Hematologic/Lymphatic: Negative.    Skin: Negative.    Musculoskeletal: Negative.    Gastrointestinal: Negative.    Genitourinary: Negative.    Neurological: Negative.    Psychiatric/Behavioral: Negative.     Allergic/Immunologic: Negative.        Last Recorded Vitals:  Vitals:    03/19/25 0809   BP: 105/70   BP Location: Right arm   Patient Position: Sitting   BP Cuff Size: Large adult   Pulse: 76  "  SpO2: 99%   Weight: 72.1 kg (159 lb)   Height: 1.702 m (5' 7\")       Physical Examination:  General: Well appearing, well-nourished, in no acute distress.  HEENT: Normocephalic atraumatic, pupils equal and reactive to light, extraocular muscles intact, no conjunctival injection, oropharynx clear without exudates.  Neck: Normal carotid arterial pulses, no arterial bruits, no thyromegaly.  Cardiac: Regular rhythm and normal heart rate.  S1, S2 present and normal.  No murmurs, rubs, or gallops.  PMI is nondisplaced. Jugular venous pulsations are normal.  Pulmonary: Normal breath sounds, no increased work of breathing, no wheezes or crackles.  GI: Normal bowel sounds, abdominal aorta not enlarged, no hepatosplenomegaly, no abdominal bruits.  Lower extremities: No cyanosis, clubbing, or edema.  No xanthelasma present. Normal distal pulses.  Skin: Skin intact. No significant rashes or lesions present.  Neuro: Alert and oriented x 3, normal attention and cognition, no focal motor or sensory neurologic deficits.  Psych: Normal affect and mood.  Musculoskeletal: Normal gait normal muscle tone.    Laboratory Studies:  Lab Results   Component Value Date    GLUCOSE 78 04/05/2024    CALCIUM 9.6 04/05/2024     04/05/2024    K 4.3 04/05/2024    CO2 26 04/05/2024     04/05/2024    BUN 19 04/05/2024    CREATININE 0.89 04/05/2024     Lab Results   Component Value Date    ALT 17 04/05/2024    AST 17 04/05/2024    ALKPHOS 81 04/05/2024    BILITOT 0.6 04/05/2024         Lab Results   Component Value Date    CHOL 116 09/23/2023    CHOL 243 (H) 02/24/2023    CHOL 216 (H) 08/09/2019     Lab Results   Component Value Date    HDL 51.4 09/23/2023    HDL 46.4 02/24/2023    HDL 45.2 08/09/2019     No results found for: \"LDLCALC\"  Lab Results   Component Value Date    TRIG 42 09/23/2023    TRIG 46 02/24/2023    TRIG 61 08/09/2019     No components found for: \"CHOLHDL\"  Lab Results   Component Value Date    HGBA1C 5.3 05/31/2023 "       Cardiology Tests:  Stress Test:8/2023  1. The patient's functional capacity was average. The blood pressure response was normal.  2. Adequate level of stress achieved.  3. No electrocardiographic or clinical evidence for ischemia at a maximal workload.    Cardiac Holter 5/2024  7-day Holter monitor showed minimum heart rate 60 bpm, maximum heart rate 161 bpm, average heart rate 98 bpm with predominant rhythm sinus rhythm.  Isolated less than 1% PACs and PVCs.  17 patient triggers consistent with sinus rhythm and PVC.  Normal Holter monitor.      Assessment and Plan:  Problem List Items Addressed This Visit          Cardiac and Vasculature    Heterozygous familial hypercholesterolemia - Primary    POTS (postural orthostatic tachycardia syndrome)     Other Visit Diagnoses       Hyperlipidemia, unspecified hyperlipidemia type              Tracey Villa is a 24 y.o. female with severe hypercholesterolemia and genetically confirmed heterozygous familial hypercholesterolemia due to a mutation in the LDL receptor who is here for a follow-up visit.  She is asymptomatic from a cardiovascular standpoint although she does have some symptoms of dizziness and swelling in the hands and feet that are most likely related to autonomic dysfunction although her tilt table test showed very mild exaggeration and heart rate response.  However her symptoms are not linked to tachycardia and so therefore I do not think a beta-blocker will be of benefit in her case.  I did describe that the mainstay of POTS treatment is an exercise program and also recommended she consider lower extremity compression stockings which can help patient symptomatically.  I have restarted her atorvastatin and sent a new prescription to her pharmacy and we will recheck a lipid panel in 3 months.  I will see her again in 1 year here in the office for routine follow-up.  Please do not hesitate to contact me with questions or concerns.    Chrales PACHECO  MD Hillary, AIXA, FACC  Director,  Center for Cardiovascular Prevention  Director,  [facility] Program  Associate Professor of Medicine  Case [facility] [employer]

## 2025-03-19 NOTE — H&P (VIEW-ONLY)
Preventive Cardiology Clinic Note    Reason for Visit: Follow up visit  Referring Clinician: No ref. provider found     History of Present Illness: Tracey Villa is a 24 y.o. female with severe hypercholesterolemia and genetically confirmed heterozygous familial hypercholesterolemia due to a mutation in the LDL receptor who is here for a follow-up visit.  Since her last visit she was told to stop her atorvastatin when she started sumatriptan for migraines.  However she is no longer taking this medication and so she would like to restart her statin therapy.  She does not report any exertional chest pain, shortness of breath, palpitations, or syncope.  She was diagnosed with a possible mild form of postural orthostatic tachycardia syndrome by tilt table test a mildly exaggerated heart rate response by tilt table in June 2024 and she saw several neurologists for this diagnosis.  Her symptoms include swelling of the ankles and dizziness.  There is no association with her symptoms with heart rate and she is not tachycardic today in the office either sitting or standing.      Past Medical History:  She has a past medical history of Acute upper respiratory infection, unspecified (02/09/2018), Anemia, Contact with and (suspected) exposure to other viral communicable diseases (12/22/2014), Depression (2021), Food intolerance (2022), GERD (gastroesophageal reflux disease), Pediculosis due to pediculus humanus capitis (12/03/2015), Personal history of diseases of the blood and blood-forming organs and certain disorders involving the immune mechanism (08/09/2019), Personal history of other diseases of the respiratory system (12/22/2014), Personal history of other diseases of the respiratory system (02/09/2018), Personal history of other specified conditions (12/22/2014), Personal history of other specified conditions (11/27/2019), Pneumonia, Urinary tract infection, and Urticaria.    Past Surgical History:  She has a  past surgical history that includes Other surgical history (02/20/2020) and Colposcopy (09/15/24).    Social History:  She reports that she has never smoked. She has never used smokeless tobacco. She reports current alcohol use. She reports that she does not use drugs.    Family History:  Family History   Problem Relation Name Age of Onset    Other (anxiety and depression) Mother Guadalupe     Hyperlipidemia Mother Guadalupe     Cancer Mother Guadalupe     Depression Mother Guadalupe     Genetic Testing Mother Guadalupe     Hypertension Mother Guadalupe     Mental illness Mother Guadalupe     Asthma Mother Guadalupe     Depression Sister Sophitia     Mental illness Sister Sophitia     Asthma Sister Sophitia     Alcohol abuse Mother's Sister CherylAnne     Alcohol abuse Mother's Sister Queenie     Vision loss Mother's Sister Queenie     Alcohol abuse Mother's Brother Jaro     Drug abuse Father's Brother Antolin     Alcohol abuse Maternal Grandmother Nina     Hypertension Maternal Grandmother Nina     Mental illness Maternal Grandmother Nina     Stroke Maternal Grandmother Nina     Alcohol abuse Maternal Grandfather Jaro     Heart disease Maternal Grandfather Jaro     Hypertension Maternal Grandfather Jaro     Mental illness Maternal Grandfather Jaro     Stroke Maternal Grandfather Jaro     Colon cancer Maternal Grandfather Jaro 50 - 59       Allergies:  Pyridium [phenazopyridine] and Latex    Outpatient Medications:  Current Outpatient Medications   Medication Instructions    Ajovy Autoinjector 225 mg, subcutaneous, Every 28 days    amitriptyline (ELAVIL) 40 mg, oral, Nightly    atogepant 10 mg, Daily    cyclobenzaprine (FLEXERIL) 5-10 mg, oral, Every 8 hours PRN    diazePAM (VALIUM) 5 mg, Nightly PRN    drospirenone, contraceptive, (Slynd) 4 mg (28) tablet 1 tablet, oral, Daily    EPINEPHrine (EPIPEN) 0.3 mg, intramuscular, As needed, Call 911 after use.    famotidine (PEPCID) 40 mg, oral, Daily    fluocinonide (Lidex) 0.05 % external solution Use  once a day to the affected area on the scalp. Use as needed. Avoid face and groin.    gabapentin (NEURONTIN) 100 mg, oral, 3 times daily    Gemtesa 75 mg, oral, Daily    hydrocortisone (Anusol-HC) 2.5 % rectal cream rectal, 2 times daily    hydrOXYzine HCL (ATARAX) 25 mg, oral, Nightly    ketoconazole (NIZOral) 2 % shampoo Use 2-3x times a week. Leave on for 5 minutes before rinsing off.    linaCLOtide (LINZESS) 290 mcg, oral, Daily before breakfast, Do not crush or chew.    methen-m.blue-s.phos-phsal-hyo (UribeL) 118-10-40.8-36 mg capsule 1 capsule, oral, Daily PRN    omeprazole (PRILOSEC) 40 mg, oral, Daily before breakfast    ondansetron ODT (ZOFRAN-ODT) 4 mg, oral, Every 6 hours PRN    polyethylene glycol-electrolytes (Nulytely Lemon-Lime) 420 gram solution 4,000 mL, oral, See admin instructions, Take 2000 ml the night before and 2000 ml the morning of your colonoscopy    pramoxine (Sarna Sensitive) 1 % lotion 1 Application, Topical, Every 4 hours PRN    propranolol (INDERAL) 40 mg, oral, 2 times daily    tamsulosin (FLOMAX) 0.8 mg, oral, Nightly    traZODone (Desyrel) 50 mg tablet Take 3 tablets (150 mg) by mouth as needed at bedtime for sleep.    triamcinolone (Kenalog) 0.1 % cream Topical, 2 times daily, Apply to affected area 1-2 times daily as needed. Avoid face and groin.    ubrogepant (UBRELVY) 100 mg, oral, As needed       Review of Systems:  Review of Systems   Constitutional: Negative.   HENT: Negative.     Eyes: Negative.    Cardiovascular: Negative.    Respiratory: Negative.     Endocrine: Negative.    Hematologic/Lymphatic: Negative.    Skin: Negative.    Musculoskeletal: Negative.    Gastrointestinal: Negative.    Genitourinary: Negative.    Neurological: Negative.    Psychiatric/Behavioral: Negative.     Allergic/Immunologic: Negative.        Last Recorded Vitals:  Vitals:    03/19/25 0809   BP: 105/70   BP Location: Right arm   Patient Position: Sitting   BP Cuff Size: Large adult   Pulse: 76  "  SpO2: 99%   Weight: 72.1 kg (159 lb)   Height: 1.702 m (5' 7\")       Physical Examination:  General: Well appearing, well-nourished, in no acute distress.  HEENT: Normocephalic atraumatic, pupils equal and reactive to light, extraocular muscles intact, no conjunctival injection, oropharynx clear without exudates.  Neck: Normal carotid arterial pulses, no arterial bruits, no thyromegaly.  Cardiac: Regular rhythm and normal heart rate.  S1, S2 present and normal.  No murmurs, rubs, or gallops.  PMI is nondisplaced. Jugular venous pulsations are normal.  Pulmonary: Normal breath sounds, no increased work of breathing, no wheezes or crackles.  GI: Normal bowel sounds, abdominal aorta not enlarged, no hepatosplenomegaly, no abdominal bruits.  Lower extremities: No cyanosis, clubbing, or edema.  No xanthelasma present. Normal distal pulses.  Skin: Skin intact. No significant rashes or lesions present.  Neuro: Alert and oriented x 3, normal attention and cognition, no focal motor or sensory neurologic deficits.  Psych: Normal affect and mood.  Musculoskeletal: Normal gait normal muscle tone.    Laboratory Studies:  Lab Results   Component Value Date    GLUCOSE 78 04/05/2024    CALCIUM 9.6 04/05/2024     04/05/2024    K 4.3 04/05/2024    CO2 26 04/05/2024     04/05/2024    BUN 19 04/05/2024    CREATININE 0.89 04/05/2024     Lab Results   Component Value Date    ALT 17 04/05/2024    AST 17 04/05/2024    ALKPHOS 81 04/05/2024    BILITOT 0.6 04/05/2024         Lab Results   Component Value Date    CHOL 116 09/23/2023    CHOL 243 (H) 02/24/2023    CHOL 216 (H) 08/09/2019     Lab Results   Component Value Date    HDL 51.4 09/23/2023    HDL 46.4 02/24/2023    HDL 45.2 08/09/2019     No results found for: \"LDLCALC\"  Lab Results   Component Value Date    TRIG 42 09/23/2023    TRIG 46 02/24/2023    TRIG 61 08/09/2019     No components found for: \"CHOLHDL\"  Lab Results   Component Value Date    HGBA1C 5.3 05/31/2023 "       Cardiology Tests:  Stress Test:8/2023  1. The patient's functional capacity was average. The blood pressure response was normal.  2. Adequate level of stress achieved.  3. No electrocardiographic or clinical evidence for ischemia at a maximal workload.    Cardiac Holter 5/2024  7-day Holter monitor showed minimum heart rate 60 bpm, maximum heart rate 161 bpm, average heart rate 98 bpm with predominant rhythm sinus rhythm.  Isolated less than 1% PACs and PVCs.  17 patient triggers consistent with sinus rhythm and PVC.  Normal Holter monitor.      Assessment and Plan:  Problem List Items Addressed This Visit          Cardiac and Vasculature    Heterozygous familial hypercholesterolemia - Primary    POTS (postural orthostatic tachycardia syndrome)     Other Visit Diagnoses       Hyperlipidemia, unspecified hyperlipidemia type              Tracey Villa is a 24 y.o. female with severe hypercholesterolemia and genetically confirmed heterozygous familial hypercholesterolemia due to a mutation in the LDL receptor who is here for a follow-up visit.  She is asymptomatic from a cardiovascular standpoint although she does have some symptoms of dizziness and swelling in the hands and feet that are most likely related to autonomic dysfunction although her tilt table test showed very mild exaggeration and heart rate response.  However her symptoms are not linked to tachycardia and so therefore I do not think a beta-blocker will be of benefit in her case.  I did describe that the mainstay of POTS treatment is an exercise program and also recommended she consider lower extremity compression stockings which can help patient symptomatically.  I have restarted her atorvastatin and sent a new prescription to her pharmacy and we will recheck a lipid panel in 3 months.  I will see her again in 1 year here in the office for routine follow-up.  Please do not hesitate to contact me with questions or concerns.    Charles PACHECO  MD Hillary, AIXA, FACC  Director,  Center for Cardiovascular Prevention  Director,  [facility] Program  Associate Professor of Medicine  [facility] [employer]

## 2025-04-02 DIAGNOSIS — M94.0 COSTOCHONDRITIS: ICD-10-CM

## 2025-04-02 RX ORDER — CYCLOBENZAPRINE HCL 5 MG
5-10 TABLET ORAL EVERY 8 HOURS PRN
Qty: 90 TABLET | Refills: 2 | Status: SHIPPED | OUTPATIENT
Start: 2025-04-02

## 2025-04-09 DIAGNOSIS — G43.E11 INTRACTABLE CHRONIC MIGRAINE WITH AURA WITH STATUS MIGRAINOSUS: ICD-10-CM

## 2025-04-09 DIAGNOSIS — K21.9 CHRONIC GERD: ICD-10-CM

## 2025-04-10 RX ORDER — PROPRANOLOL HYDROCHLORIDE 40 MG/1
40 TABLET ORAL 2 TIMES DAILY
Qty: 60 TABLET | Refills: 0 | Status: SHIPPED | OUTPATIENT
Start: 2025-04-10

## 2025-04-10 RX ORDER — OMEPRAZOLE 40 MG/1
40 CAPSULE, DELAYED RELEASE ORAL
Qty: 90 CAPSULE | Refills: 0 | Status: SHIPPED | OUTPATIENT
Start: 2025-04-10

## 2025-04-11 ENCOUNTER — ANESTHESIA EVENT (OUTPATIENT)
Dept: OPERATING ROOM | Facility: HOSPITAL | Age: 25
End: 2025-04-11
Payer: MEDICAID

## 2025-04-14 ENCOUNTER — HOSPITAL ENCOUNTER (OUTPATIENT)
Dept: OPERATING ROOM | Facility: HOSPITAL | Age: 25
Setting detail: OUTPATIENT SURGERY
Discharge: HOME | End: 2025-04-14
Payer: MEDICAID

## 2025-04-14 ENCOUNTER — ANESTHESIA (OUTPATIENT)
Dept: OPERATING ROOM | Facility: HOSPITAL | Age: 25
End: 2025-04-14
Payer: MEDICAID

## 2025-04-14 VITALS
SYSTOLIC BLOOD PRESSURE: 117 MMHG | HEART RATE: 66 BPM | OXYGEN SATURATION: 100 % | DIASTOLIC BLOOD PRESSURE: 74 MMHG | WEIGHT: 158.73 LBS | BODY MASS INDEX: 27.1 KG/M2 | RESPIRATION RATE: 16 BRPM | HEIGHT: 64 IN | TEMPERATURE: 97.5 F

## 2025-04-14 DIAGNOSIS — K62.5 BRBPR (BRIGHT RED BLOOD PER RECTUM): ICD-10-CM

## 2025-04-14 DIAGNOSIS — K92.1 BLACK TARRY STOOLS: ICD-10-CM

## 2025-04-14 DIAGNOSIS — R14.0 BLOATING: ICD-10-CM

## 2025-04-14 PROCEDURE — 3600000002 HC OR TIME - INITIAL BASE CHARGE - PROCEDURE LEVEL TWO: Performed by: ANESTHESIOLOGY

## 2025-04-14 PROCEDURE — 2500000004 HC RX 250 GENERAL PHARMACY W/ HCPCS (ALT 636 FOR OP/ED): Performed by: ANESTHESIOLOGY

## 2025-04-14 PROCEDURE — 45378 DIAGNOSTIC COLONOSCOPY: CPT | Performed by: INTERNAL MEDICINE

## 2025-04-14 PROCEDURE — 3700000001 HC GENERAL ANESTHESIA TIME - INITIAL BASE CHARGE: Performed by: ANESTHESIOLOGY

## 2025-04-14 PROCEDURE — 2500000005 HC RX 250 GENERAL PHARMACY W/O HCPCS: Performed by: NURSE ANESTHETIST, CERTIFIED REGISTERED

## 2025-04-14 PROCEDURE — 3700000002 HC GENERAL ANESTHESIA TIME - EACH INCREMENTAL 1 MINUTE: Performed by: ANESTHESIOLOGY

## 2025-04-14 PROCEDURE — 7100000009 HC PHASE TWO TIME - INITIAL BASE CHARGE: Performed by: ANESTHESIOLOGY

## 2025-04-14 PROCEDURE — A45378 PR COLONOSCOPY,DIAGNOSTIC: Performed by: ANESTHESIOLOGY

## 2025-04-14 PROCEDURE — A45378 PR COLONOSCOPY,DIAGNOSTIC: Performed by: NURSE ANESTHETIST, CERTIFIED REGISTERED

## 2025-04-14 PROCEDURE — 2500000004 HC RX 250 GENERAL PHARMACY W/ HCPCS (ALT 636 FOR OP/ED): Performed by: NURSE ANESTHETIST, CERTIFIED REGISTERED

## 2025-04-14 PROCEDURE — 43239 EGD BIOPSY SINGLE/MULTIPLE: CPT | Performed by: INTERNAL MEDICINE

## 2025-04-14 PROCEDURE — 3600000007 HC OR TIME - EACH INCREMENTAL 1 MINUTE - PROCEDURE LEVEL TWO: Performed by: ANESTHESIOLOGY

## 2025-04-14 PROCEDURE — 7100000010 HC PHASE TWO TIME - EACH INCREMENTAL 1 MINUTE: Performed by: ANESTHESIOLOGY

## 2025-04-14 RX ORDER — ONDANSETRON HYDROCHLORIDE 2 MG/ML
4 INJECTION, SOLUTION INTRAVENOUS ONCE AS NEEDED
Status: DISCONTINUED | OUTPATIENT
Start: 2025-04-14 | End: 2025-04-15 | Stop reason: HOSPADM

## 2025-04-14 RX ORDER — MIDAZOLAM HYDROCHLORIDE 1 MG/ML
INJECTION, SOLUTION INTRAMUSCULAR; INTRAVENOUS AS NEEDED
Status: DISCONTINUED | OUTPATIENT
Start: 2025-04-14 | End: 2025-04-14

## 2025-04-14 RX ORDER — LIDOCAINE HYDROCHLORIDE 20 MG/ML
SOLUTION OROPHARYNGEAL AS NEEDED
Status: DISCONTINUED | OUTPATIENT
Start: 2025-04-14 | End: 2025-04-14

## 2025-04-14 RX ORDER — SODIUM CHLORIDE, SODIUM LACTATE, POTASSIUM CHLORIDE, CALCIUM CHLORIDE 600; 310; 30; 20 MG/100ML; MG/100ML; MG/100ML; MG/100ML
20 INJECTION, SOLUTION INTRAVENOUS CONTINUOUS
Status: DISCONTINUED | OUTPATIENT
Start: 2025-04-14 | End: 2025-04-15 | Stop reason: HOSPADM

## 2025-04-14 RX ORDER — DROPERIDOL 2.5 MG/ML
0.62 INJECTION, SOLUTION INTRAMUSCULAR; INTRAVENOUS ONCE AS NEEDED
Status: DISCONTINUED | OUTPATIENT
Start: 2025-04-14 | End: 2025-04-15 | Stop reason: HOSPADM

## 2025-04-14 RX ORDER — LIDOCAINE HCL/PF 100 MG/5ML
SYRINGE (ML) INTRAVENOUS AS NEEDED
Status: DISCONTINUED | OUTPATIENT
Start: 2025-04-14 | End: 2025-04-14

## 2025-04-14 RX ORDER — FENTANYL CITRATE 50 UG/ML
INJECTION, SOLUTION INTRAMUSCULAR; INTRAVENOUS AS NEEDED
Status: DISCONTINUED | OUTPATIENT
Start: 2025-04-14 | End: 2025-04-14

## 2025-04-14 RX ORDER — PROPOFOL 10 MG/ML
INJECTION, EMULSION INTRAVENOUS AS NEEDED
Status: DISCONTINUED | OUTPATIENT
Start: 2025-04-14 | End: 2025-04-14

## 2025-04-14 RX ADMIN — PROPOFOL 140 MCG/KG/MIN: 10 INJECTION, EMULSION INTRAVENOUS at 08:13

## 2025-04-14 RX ADMIN — SODIUM CHLORIDE, SODIUM LACTATE, POTASSIUM CHLORIDE, AND CALCIUM CHLORIDE 20 ML/HR: .6; .31; .03; .02 INJECTION, SOLUTION INTRAVENOUS at 07:21

## 2025-04-14 RX ADMIN — LIDOCAINE HYDROCHLORIDE 50 MG: 20 INJECTION INTRAVENOUS at 08:06

## 2025-04-14 RX ADMIN — LIDOCAINE HYDROCHLORIDE 80 MG: 20 SOLUTION ORAL at 08:12

## 2025-04-14 RX ADMIN — PROPOFOL 20 MG: 10 INJECTION, EMULSION INTRAVENOUS at 08:15

## 2025-04-14 RX ADMIN — MIDAZOLAM 2 MG: 1 INJECTION INTRAMUSCULAR; INTRAVENOUS at 08:06

## 2025-04-14 RX ADMIN — PROPOFOL 30 MG: 10 INJECTION, EMULSION INTRAVENOUS at 08:12

## 2025-04-14 RX ADMIN — FENTANYL CITRATE 50 MCG: 50 INJECTION, SOLUTION INTRAMUSCULAR; INTRAVENOUS at 08:12

## 2025-04-14 SDOH — HEALTH STABILITY: MENTAL HEALTH: CURRENT SMOKER: 0

## 2025-04-14 ASSESSMENT — PAIN SCALES - GENERAL
PAIN_LEVEL: 0
PAINLEVEL_OUTOF10: 0 - NO PAIN

## 2025-04-14 ASSESSMENT — PAIN - FUNCTIONAL ASSESSMENT: PAIN_FUNCTIONAL_ASSESSMENT: 0-10

## 2025-04-14 NOTE — ANESTHESIA POSTPROCEDURE EVALUATION
Patient: Tracey Villa    Procedure Summary       Date: 04/14/25 Room / Location: [facility] OR    Anesthesia Start: 0804 Anesthesia Stop: 0844    Procedures:       COLONOSCOPY      EGD Diagnosis:       BRBPR (bright red blood per rectum)      Bloating      Black tarry stools    Scheduled Providers: Sarah Montenegro MD; Ajay Zepeda MD Responsible Provider: Ajay Zepeda MD    Anesthesia Type: MAC ASA Status: Not recorded            Anesthesia Type: MAC    Vitals Value Taken Time   /69 04/14/25 0844   Temp 36.4 04/14/25 0844   Pulse 73 04/14/25 0844   Resp 16 04/14/25 0844   SpO2 100 04/14/25 0844       Anesthesia Post Evaluation    Patient location during evaluation: PACU  Patient participation: complete - patient participated  Level of consciousness: awake and alert  Pain score: 0  Pain management: adequate  Multimodal analgesia pain management approach  Airway patency: patent  Two or more strategies used to mitigate risk of obstructive sleep apnea  Cardiovascular status: acceptable and stable  Respiratory status: acceptable and room air  Hydration status: acceptable  Postoperative Nausea and Vomiting: none        There were no known notable events for this encounter.

## 2025-04-14 NOTE — DISCHARGE INSTRUCTIONS
Patient Instructions after a Colonoscopy      The anesthetics, sedatives or narcotics which were given to you today will be acting in your body for the next 24 hours, so you might feel a little sleepy or groggy.  This feeling should slowly wear off. Carefully read and follow the instructions.     You received sedation today:  - Do not drive or operate any machinery or power tools of any kind.   - No alcoholic beverages today, not even beer or wine.  - Do not make any important decisions or sign any legal documents.  - No over the counter medications that contain alcohol or that may cause drowsiness.  - Do not make any important decisions or sign any legal documents.  - Make sure you have someone with you for first 24 hours.    While it is common to experience mild to moderate abdominal distention, gas, or belching after your procedure, if any of these symptoms occur following discharge from the GI Lab or within one week of having your procedure, call the [facility] to be advised whether a visit to your nearest Urgent Care or Emergency Department is indicated.  Take this paper with you if you go.     - If you develop an allergic reaction to the medications that were given during your procedure such as difficulty breathing, rash, hives, severe nausea, vomiting or lightheadedness.  - If you experience chest pain, shortness of breath, severe abdominal pain, fevers and chills.  -If you develop signs and symptoms of bleeding such as blood in your spit, if your stools turn black, tarry, or bloody  - If you have not urinated within 8 hours following your procedure.  - If your IV site becomes painful, red, inflamed, or looks infected.    If you received a biopsy/polypectomy/sphincterotomy the following instructions apply below:    __ Do not use Aspirin containing products, non-steroidal medications or anti-coagulants for one week following your procedure. (Examples of these types of medications are: Advil,  Arthrotec, Aleve, Coumadin, Ecotrin, Heparin, Ibuprofen, Indocin, Motrin, Naprosyn, Nuprin, Plavix, Vioxx, and Voltarin, or their generic forms.  This list is not all-inclusive.  Check with your physician or pharmacist before resuming medications.)   __ Eat a soft diet today.  Avoid foods that are poorly digested for the next 24 hours.  These foods would include: nuts, beans, lettuce, red meats, and fried foods. Start with liquids and advance your diet as tolerated, gradually work up to eating solids.   __ Do not have a Barium Study or Enema for one week.    Your physician recommends the additional following instructions:    -You have a contact number available for emergencies. The signs and symptoms of potential delayed complications were discussed with you. You may return to normal activities tomorrow.  -Resume your previous diet.  -Continue your present medications.   -We are waiting for your pathology results.  -Your physician has recommended a repeat colonoscopy (date to be determined after pending pathology results are reviewed) for surveillance based on pathology results.  -The findings and recommendations have been discussed with you.  -The findings and recommendations were discussed with your family.  - Please see Medication Reconciliation Form for new medication/medications prescribed.       If you experience any problems or have any questions following discharge from the GI Lab, please call:        Nurse Signature                                                                        Date___________________                                                                            Patient/Responsible Party Signature                                        Date___________________Patient Instructions after an endoscopy or colonoscopy      The anesthetics, sedatives or narcotics which were given to you today will be acting in your body for the next 24 hours, so you might feel a little sleepy or groggy.  This  feeling should slowly wear off. Carefully read and follow the instructions.     You received sedation today:  - Do not drive or operate any machinery or power tools of any kind.   - No alcoholic beverages today, not even beer or wine.  - Do not make any important decisions or sign any legal documents.  - No over the counter medications that contain alcohol or that may cause drowsiness.  - Do not make any important decisions or sign any legal documents.    While it is common to experience mild to moderate abdominal distention, gas, or belching after your procedure, if any of these symptoms occur following discharge from the GI Lab or within one week of having your procedure, call the [facility] to be advised whether a visit to your nearest Urgent Care or Emergency Department is indicated.  Take this paper with you if you go.     - If you develop an allergic reaction to the medications that were given during your procedure such as difficulty breathing, rash, hives, severe nausea, vomiting or lightheadedness.- If you experience chest pain, shortness of breath, severe abdominal pain, fevers and chills.    -If you develop signs and symptoms of bleeding such as blood in your spit, if your stools turn black, tarry, or bloody    - If you have not urinated within 8 hours following your procedure.- If your IV site becomes painful, red, inflamed, or looks infected.

## 2025-04-14 NOTE — ANESTHESIA PREPROCEDURE EVALUATION
Patient: Tracey Villa    Procedure Information       Anesthesia Start Date/Time: 04/14/25 0804    Scheduled providers: Sarah Montenegro MD; Ajay Zepeda MD    Procedures:       COLONOSCOPY      EGD    Location: [facility] OR            Relevant Problems   Cardiac   (+) Atypical chest pain   (+) Heterozygous familial hypercholesterolemia   (+) Mixed hyperlipidemia      Neuro   (+) Anxiety   (+) Cervical radiculopathy   (+) Intractable chronic migraine with aura with status migrainosus   (+) Intractable chronic post-traumatic headache   (+) PTSD (post-traumatic stress disorder)      GI   (+) Chronic GERD      Hematology   (+) Iron deficiency anemia      Musculoskeletal   (+) Interstitial cystitis      GYN   (+) Menorrhagia with regular cycle       Clinical information reviewed:   Tobacco  Allergies  Meds   Med Hx  Surg Hx   Fam Hx  Soc Hx        NPO Detail:  NPO/Void Status  Carbohydrate Drink Given Prior to Surgery? : N  Date of Last Liquid: 04/14/25  Time of Last Liquid: 0500  Date of Last Solid: 04/13/25  Time of Last Solid: 0800  Last Intake Type: Clear fluids  Time of Last Void: 0600         Physical Exam    Airway  Mallampati: II  TM distance: >3 FB  Neck ROM: full     Cardiovascular - normal exam     Dental    Pulmonary - normal exam     Abdominal - normal exam           Anesthesia Plan    History of general anesthesia?: yes  History of complications of general anesthesia?: no    ASA 2     MAC     The patient is not a current smoker.    intravenous induction   Anesthetic plan and risks discussed with patient.    Plan discussed with CRNA and attending.

## 2025-04-15 ENCOUNTER — PREP FOR PROCEDURE (OUTPATIENT)
Dept: UROLOGY | Facility: CLINIC | Age: 25
End: 2025-04-15
Payer: MEDICAID

## 2025-04-22 ENCOUNTER — APPOINTMENT (OUTPATIENT)
Dept: UROLOGY | Facility: CLINIC | Age: 25
End: 2025-04-22
Payer: MEDICAID

## 2025-04-24 LAB
LAB AP ASR DISCLAIMER: NORMAL
LABORATORY COMMENT REPORT: NORMAL
PATH REPORT.COMMENTS IMP SPEC: NORMAL
PATH REPORT.FINAL DX SPEC: NORMAL
PATH REPORT.GROSS SPEC: NORMAL
PATH REPORT.RELEVANT HX SPEC: NORMAL
PATH REPORT.TOTAL CANCER: NORMAL

## 2025-04-28 ENCOUNTER — TELEPHONE (OUTPATIENT)
Dept: UROLOGY | Facility: CLINIC | Age: 25
End: 2025-04-28

## 2025-04-28 ENCOUNTER — PRE-ADMISSION TESTING (OUTPATIENT)
Dept: PREADMISSION TESTING | Facility: HOSPITAL | Age: 25
End: 2025-04-28
Payer: MEDICAID

## 2025-04-28 ENCOUNTER — APPOINTMENT (OUTPATIENT)
Dept: PRIMARY CARE | Facility: CLINIC | Age: 25
End: 2025-04-28
Payer: MEDICAID

## 2025-04-28 VITALS
DIASTOLIC BLOOD PRESSURE: 80 MMHG | RESPIRATION RATE: 16 BRPM | BODY MASS INDEX: 25.61 KG/M2 | HEIGHT: 67 IN | SYSTOLIC BLOOD PRESSURE: 128 MMHG | HEART RATE: 82 BPM | WEIGHT: 163.14 LBS | TEMPERATURE: 98.2 F | OXYGEN SATURATION: 100 %

## 2025-04-28 VITALS
WEIGHT: 160.8 LBS | OXYGEN SATURATION: 100 % | HEART RATE: 97 BPM | SYSTOLIC BLOOD PRESSURE: 118 MMHG | BODY MASS INDEX: 27.6 KG/M2 | DIASTOLIC BLOOD PRESSURE: 68 MMHG

## 2025-04-28 DIAGNOSIS — R41.9 COGNITIVE COMPLAINTS: ICD-10-CM

## 2025-04-28 DIAGNOSIS — Z13.29 SCREENING FOR THYROID DISORDER: Primary | ICD-10-CM

## 2025-04-28 DIAGNOSIS — R10.2 PELVIC PAIN: ICD-10-CM

## 2025-04-28 DIAGNOSIS — G43.E11 INTRACTABLE CHRONIC MIGRAINE WITH AURA WITH STATUS MIGRAINOSUS: ICD-10-CM

## 2025-04-28 DIAGNOSIS — Z01.818 ENCOUNTER FOR PREADMISSION TESTING: Primary | ICD-10-CM

## 2025-04-28 DIAGNOSIS — F41.9 ANXIETY: ICD-10-CM

## 2025-04-28 DIAGNOSIS — N30.10 INTERSTITIAL CYSTITIS: ICD-10-CM

## 2025-04-28 DIAGNOSIS — G90.A POTS (POSTURAL ORTHOSTATIC TACHYCARDIA SYNDROME): ICD-10-CM

## 2025-04-28 PROCEDURE — 99203 OFFICE O/P NEW LOW 30 MIN: CPT | Performed by: NURSE PRACTITIONER

## 2025-04-28 PROCEDURE — 99214 OFFICE O/P EST MOD 30 MIN: CPT

## 2025-04-28 PROCEDURE — 1036F TOBACCO NON-USER: CPT

## 2025-04-28 RX ORDER — VIBEGRON 75 MG/1
75 TABLET, FILM COATED ORAL DAILY
COMMUNITY

## 2025-04-28 RX ORDER — ATORVASTATIN CALCIUM 80 MG/1
80 TABLET, FILM COATED ORAL DAILY
COMMUNITY

## 2025-04-28 RX ORDER — DIAZEPAM 5 MG/1
5 TABLET ORAL EVERY 8 HOURS PRN
COMMUNITY

## 2025-04-28 ASSESSMENT — DUKE ACTIVITY SCORE INDEX (DASI)
CAN YOU WALK INDOORS, SUCH AS AROUND YOUR HOUSE: YES
CAN YOU RUN A SHORT DISTANCE: YES
CAN YOU DO HEAVY WORK AROUND THE HOUSE LIKE SCRUBBING FLOORS OR LIFTING AND MOVING HEAVY FURNITURE: YES
CAN YOU DO YARD WORK LIKE RAKING LEAVES, WEEDING OR PUSHING A MOWER: YES
CAN YOU DO MODERATE WORK AROUND THE HOUSE LIKE VACUUMING, SWEEPING FLOORS OR CARRYING GROCERIES: YES
CAN YOU TAKE CARE OF YOURSELF (EAT, DRESS, BATHE, OR USE TOILET): YES
TOTAL_SCORE: 58.2
CAN YOU CLIMB A FLIGHT OF STAIRS OR WALK UP A HILL: YES
CAN YOU HAVE SEXUAL RELATIONS: YES
CAN YOU WALK A BLOCK OR TWO ON LEVEL GROUND: YES
CAN YOU PARTICIPATE IN STRENOUS SPORTS LIKE SWIMMING, SINGLES TENNIS, FOOTBALL, BASKETBALL, OR SKIING: YES
CAN YOU PARTICIPATE IN MODERATE RECREATIONAL ACTIVITIES LIKE GOLF, BOWLING, DANCING, DOUBLES TENNIS OR THROWING A BASEBALL OR FOOTBALL: YES
CAN YOU DO LIGHT WORK AROUND THE HOUSE LIKE DUSTING OR WASHING DISHES: YES
DASI METS SCORE: 9.9

## 2025-04-28 ASSESSMENT — PATIENT HEALTH QUESTIONNAIRE - PHQ9
1. LITTLE INTEREST OR PLEASURE IN DOING THINGS: NOT AT ALL
2. FEELING DOWN, DEPRESSED OR HOPELESS: NOT AT ALL
SUM OF ALL RESPONSES TO PHQ9 QUESTIONS 1 AND 2: 0
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
## 2025-04-28 NOTE — PROGRESS NOTES
Subjective   Patient ID: Tracey Villa is a 25 y.o. female who presents for Migraine (Every day pressure in head).  HPI  Graduating college in 3 weeks     GI sx  - diagnosed with IBS-C  - since colonoscopy, she has been having green stools and hard time going to the bathroom  - usually linzess helps, has not been helping recently     POTS  -on propranolol 40 mg BID   - has referral in for POTS specialist    Mood  - has been okay. A little more depressed than normal. Feeling a little overwhelmed with end of college stress. Having a lot of pelvic and vaginal pain as well which is contributing. Has a procedure wednesday for nerve block and pelvic trigger point injections.     Brain fog  - felt findings c/w stress, depression, PTSD, learning disability. Recommended continuing to work on mood and felt migraine and subjective cognitive sx may improve with improved psychiatric sx. Recommended continued progressive return to activities. Consider referral to headache and POTS specialist.    Migraines - has referral in for Dr Pagan  - getting less migraines with the ajovy but still having daily head pressure   - ubrelvy doesnt always help- may only help if she catches it really early. If she misses the onset, even 2 doses does not help.     Current Acute Headache Treatment -Ubrelvy samples help  -Zofran helps w nausea         Current Preventative Headache Treatment -Amitriptyline - has not helped migraines   -Propranolol  - has helped POTS but not migraines  - gabapentin 100 mg for RLS but does not help her migraines  -Qulipta samples helped but couldnt get it approved  - Ajovy     Previous Acute Headache Treatment -Sumatriptan 50 did not help  -Sumatriptan 100- did not help   -Rizatriptan ODT did not help  -Reglan did not help nausea   -Nurtec did not help      Previous preventive Flexeril   Zanaflex   Cymbalta- did not tolerate      Current Medications[1]   Surgical History[2]   Medical History[3]  Social  "History[4]   Family History[5]   Review of Systems  10 point ROS negative except as otherwise noted in the HPI.      Objective   /68   Pulse 97   Wt 72.9 kg (160 lb 12.8 oz)   SpO2 100%   BMI 27.60 kg/m²    Physical Exam  Constitutional:       Appearance: Normal appearance.   HENT:      Head: Normocephalic and atraumatic.   Eyes:      Extraocular Movements: Extraocular movements intact.      Pupils: Pupils are equal, round, and reactive to light.   Cardiovascular:      Rate and Rhythm: Normal rate and regular rhythm.      Pulses: Normal pulses.      Heart sounds: Normal heart sounds.   Pulmonary:      Effort: Pulmonary effort is normal.      Breath sounds: Normal breath sounds.   Musculoskeletal:         General: Normal range of motion.      Right lower leg: Edema present.      Left lower leg: Edema present.      Comments: Mild LE non pitting edema bilaterally and hand swelling bilaterally   Skin:     General: Skin is warm and dry.      Findings: No rash.   Neurological:      General: No focal deficit present.      Mental Status: She is alert and oriented to person, place, and time.   Psychiatric:         Mood and Affect: Mood normal.         Behavior: Behavior normal.           Assessment/Plan   Problem List Items Addressed This Visit       Intractable chronic migraine with aura with status migrainosus  - ajovy definitely helping some-- less frequent \"migraines\" but still daily headaches.   - ublrevy works only sometimes and only if she catches it early enough   - has a headache referral in     POTS (postural orthostatic tachycardia syndrome)  - continue propranolol. Feels sx have been worse lately.   - having some LE and hand swelling, suspect from increased salt in diet. Trial compression stockings.  - has referral in for Dr Peguero     Cognitive complaints  - neuropsych testing felt to be related to mood, migraine, POTS, learning disorder  - will continue to optimize comorbidities     Other Visit " Diagnoses         Screening for thyroid disorder    -  Primary  - gaining some weight more recently. May be medication related. Also having some constipation and bloating.   - check thyroid  - will get the cmp and cbc Marian Hartley ordered today    Relevant Orders    TSH with reflex to Free T4 if abnormal    Comprehensive metabolic panel    CBC     Continue fu with GI and urogyn        Discussed at visit any disease processes that were of concern as well as the risks, benefits and instructions on any new medication provided. Patient (and/or caretaker of patient if present) stated all questions were answered, and they voiced understanding of instructions.     Bibiana Silva PA-C          [1]   Current Outpatient Medications:     amitriptyline (Elavil) 10 mg tablet, Take 4 tablets (40 mg) by mouth once daily at bedtime., Disp: 360 tablet, Rfl: 2    atogepant 10 mg tablet, Take 10 mg by mouth once daily., Disp: , Rfl:     cyclobenzaprine (Flexeril) 5 mg tablet, Take 1-2 tablets (5-10 mg) by mouth every 8 hours if needed for muscle spasms., Disp: 90 tablet, Rfl: 2    drospirenone, contraceptive, (Slynd) 4 mg (28) tablet, Take 1 tablet by mouth once daily., Disp: 90 tablet, Rfl: 3    EPINEPHrine (Epipen) 0.3 mg/0.3 mL injection syringe, Inject 0.3 mL (0.3 mg) into the muscle if needed for anaphylaxis for up to 1 day. Call 911 after use., Disp: 2 each, Rfl: 2    famotidine (Pepcid) 40 mg tablet, Take 1 tablet (40 mg) by mouth once daily., Disp: 30 tablet, Rfl: 11    fluocinonide (Lidex) 0.05 % external solution, Use once a day to the affected area on the scalp. Use as needed. Avoid face and groin., Disp: 60 mL, Rfl: 3    fremanezumab (Ajovy Autoinjector) 225 mg/1.5 mL auto-injector, Inject 1 Pen (225 mg) under the skin every 28 (twenty-eight) days., Disp: 1 each, Rfl: 11    gabapentin (Neurontin) 100 mg capsule, Take 1 capsule (100 mg) by mouth 3 times a day., Disp: 90 capsule, Rfl: 11    hydrOXYzine HCL (Atarax) 25 mg  tablet, Take 1 tablet (25 mg) by mouth once daily at bedtime., Disp: 90 tablet, Rfl: 3    ketoconazole (NIZOral) 2 % shampoo, Use 2-3x times a week. Leave on for 5 minutes before rinsing off., Disp: 120 mL, Rfl: 11    linaCLOtide (Linzess) 290 mcg capsule, Take 1 capsule (290 mcg) by mouth once daily in the morning. Take before meals. Do not crush or chew., Disp: 90 capsule, Rfl: 3    omeprazole (PriLOSEC) 40 mg DR capsule, TAKE 1 CAPSULE BY MOUTH ONCE DAILY IN THE MORNING BEFORE MEAL(S), Disp: 90 capsule, Rfl: 0    ondansetron ODT (Zofran-ODT) 4 mg disintegrating tablet, Take 1 tablet (4 mg) by mouth every 6 hours if needed for nausea., Disp: 20 tablet, Rfl: 11    polyethylene glycol-electrolytes (Nulytely Lemon-Lime) 420 gram solution, Take 4,000 mL by mouth see administration instructions. Take 2000 ml the night before and 2000 ml the morning of your colonoscopy, Disp: 4000 mL, Rfl: 0    propranolol (Inderal) 40 mg tablet, Take 1 tablet by mouth twice daily, Disp: 60 tablet, Rfl: 0    tamsulosin (Flomax) 0.4 mg 24 hr capsule, Take 2 capsules (0.8 mg) by mouth once daily at bedtime., Disp: 180 capsule, Rfl: 3    traZODone (Desyrel) 50 mg tablet, Take 3 tablets (150 mg) by mouth as needed at bedtime for sleep., Disp: , Rfl:     triamcinolone (Kenalog) 0.1 % cream, Apply topically 2 times a day. Apply to affected area 1-2 times daily as needed. Avoid face and groin., Disp: 30 g, Rfl: 0    ubrogepant (Ubrelvy) 100 mg tablet tablet, Take 1 tablet (100 mg) by mouth if needed (Take one tablet at onset of migraine. If still present 2 hrs later, can take a second tablet.)., Disp: 16 tablet, Rfl: 3    hydrocortisone (Anusol-HC) 2.5 % rectal cream, Insert into the rectum 2 times a day for 10 days., Disp: 28 g, Rfl: 1  [2]   Past Surgical History:  Procedure Laterality Date    COLPOSCOPY  09/15/24    OTHER SURGICAL HISTORY  02/20/2020    No history of surgery   [3]   Past Medical History:  Diagnosis Date    Acute upper  respiratory infection, unspecified 02/09/2018    Adult attention deficit hyperactivity disorder     Anemia     Anxiety     Black tarry stools     Bloating     BRBPR (bright red blood per rectum)     Depression 2021    Exposure to influenza 12/22/2014    Food intolerance 2022    GERD (gastroesophageal reflux disease)     History of epistaxis 11/27/2019    History of fever 12/22/2014    History of iron deficiency anemia 08/09/2019    History of pharyngitis 12/22/2014    History of sore throat 02/09/2018    Pediculosis due to pediculus humanus capitis 12/03/2015    Lice infested hair    Pneumonia     Urinary tract infection     Urticaria    [4]   Social History  Tobacco Use    Smoking status: Never    Smokeless tobacco: Never   Vaping Use    Vaping status: Never Used   Substance Use Topics    Alcohol use: Yes     Comment: Social    Drug use: Never   [5]   Family History  Problem Relation Name Age of Onset    Other (anxiety and depression) Mother Guadalupe     Hyperlipidemia Mother Guadalupe     Cancer Mother Guadalupe     Depression Mother Guadalupe     Genetic Testing Mother Guadalupe     Hypertension Mother Guadalupe     Mental illness Mother Guadalupe     Asthma Mother Guadalupe     Depression Sister Sophitia     Mental illness Sister Sophitia     Asthma Sister Sophitia     Alcohol abuse Mother's Sister CherylAnne     Alcohol abuse Mother's Sister Queenie     Vision loss Mother's Sister Queenie     Alcohol abuse Mother's Brother Jaro     Drug abuse Father's Brother Antolin     Alcohol abuse Maternal Grandmother Nina     Hypertension Maternal Grandmother Nina     Mental illness Maternal Grandmother Nina     Stroke Maternal Grandmother Nina     Alcohol abuse Maternal Grandfather Jaro     Heart disease Maternal Grandfather Jaro     Hypertension Maternal Grandfather Jaro     Mental illness Maternal Grandfather Jaro     Stroke Maternal Grandfather Jaro     Colon cancer Maternal Grandfather Jaro 50 - 59

## 2025-04-28 NOTE — TELEPHONE ENCOUNTER
PA for [employer] approved.    Outcome  Approved today by [place] [employer] 2017  Your PA request for [id number] was approved for 365 days. The PA# assigned is [id number].  Effective Date: 4/28/2025  Authorization Expiration Date: 4/27/2026

## 2025-04-28 NOTE — PATIENT INSTRUCTIONS
Add 1 cap miralax daily   Continue linzess  Continue ajovy  Schedule with POTS and headache specialists   Will call with lab results

## 2025-04-29 LAB
ALBUMIN SERPL-MCNC: 4.9 G/DL (ref 3.6–5.1)
ALP SERPL-CCNC: 92 U/L (ref 31–125)
ALT SERPL-CCNC: 12 U/L (ref 6–29)
ANION GAP SERPL CALCULATED.4IONS-SCNC: 10 MMOL/L (CALC) (ref 7–17)
AST SERPL-CCNC: 15 U/L (ref 10–30)
BILIRUB SERPL-MCNC: 0.5 MG/DL (ref 0.2–1.2)
BUN SERPL-MCNC: 22 MG/DL (ref 7–25)
CALCIUM SERPL-MCNC: 9.5 MG/DL (ref 8.6–10.2)
CHLORIDE SERPL-SCNC: 106 MMOL/L (ref 98–110)
CO2 SERPL-SCNC: 22 MMOL/L (ref 20–32)
CREAT SERPL-MCNC: 0.83 MG/DL (ref 0.5–0.96)
EGFRCR SERPLBLD CKD-EPI 2021: 102 ML/MIN/1.73M2
ERYTHROCYTE [DISTWIDTH] IN BLOOD BY AUTOMATED COUNT: 12.9 % (ref 11–15)
GLUCOSE SERPL-MCNC: 87 MG/DL (ref 65–99)
HCT VFR BLD AUTO: 39.2 % (ref 35–45)
HGB BLD-MCNC: 12.9 G/DL (ref 11.7–15.5)
MCH RBC QN AUTO: 30.8 PG (ref 27–33)
MCHC RBC AUTO-ENTMCNC: 32.9 G/DL (ref 32–36)
MCV RBC AUTO: 93.6 FL (ref 80–100)
PLATELET # BLD AUTO: 210 THOUSAND/UL (ref 140–400)
PMV BLD REES-ECKER: 11.5 FL (ref 7.5–12.5)
POTASSIUM SERPL-SCNC: 4.5 MMOL/L (ref 3.5–5.3)
PROT SERPL-MCNC: 7.3 G/DL (ref 6.1–8.1)
RBC # BLD AUTO: 4.19 MILLION/UL (ref 3.8–5.1)
SODIUM SERPL-SCNC: 138 MMOL/L (ref 135–146)
WBC # BLD AUTO: 3.9 THOUSAND/UL (ref 3.8–10.8)

## 2025-04-30 ENCOUNTER — ANESTHESIA EVENT (OUTPATIENT)
Dept: OPERATING ROOM | Facility: HOSPITAL | Age: 25
End: 2025-04-30
Payer: MEDICAID

## 2025-04-30 ENCOUNTER — ANESTHESIA (OUTPATIENT)
Dept: OPERATING ROOM | Facility: HOSPITAL | Age: 25
End: 2025-04-30
Payer: MEDICAID

## 2025-04-30 ENCOUNTER — HOSPITAL ENCOUNTER (OUTPATIENT)
Facility: HOSPITAL | Age: 25
Setting detail: OUTPATIENT SURGERY
Discharge: HOME | End: 2025-04-30
Attending: OBSTETRICS & GYNECOLOGY | Admitting: OBSTETRICS & GYNECOLOGY
Payer: MEDICAID

## 2025-04-30 VITALS
RESPIRATION RATE: 16 BRPM | DIASTOLIC BLOOD PRESSURE: 69 MMHG | SYSTOLIC BLOOD PRESSURE: 113 MMHG | OXYGEN SATURATION: 100 % | HEART RATE: 62 BPM | TEMPERATURE: 97.5 F

## 2025-04-30 LAB
PREGNANCY TEST URINE, POC: NEGATIVE
TSH SERPL-ACNC: 2.19 MIU/L

## 2025-04-30 PROCEDURE — 52260 CYSTOSCOPY AND TREATMENT: CPT | Performed by: OBSTETRICS & GYNECOLOGY

## 2025-04-30 PROCEDURE — 3600000008 HC OR TIME - EACH INCREMENTAL 1 MINUTE - PROCEDURE LEVEL THREE: Performed by: OBSTETRICS & GYNECOLOGY

## 2025-04-30 PROCEDURE — 2500000004 HC RX 250 GENERAL PHARMACY W/ HCPCS (ALT 636 FOR OP/ED): Mod: JZ | Performed by: ANESTHESIOLOGY

## 2025-04-30 PROCEDURE — 2500000005 HC RX 250 GENERAL PHARMACY W/O HCPCS: Performed by: OBSTETRICS & GYNECOLOGY

## 2025-04-30 PROCEDURE — 7100000010 HC PHASE TWO TIME - EACH INCREMENTAL 1 MINUTE: Performed by: OBSTETRICS & GYNECOLOGY

## 2025-04-30 PROCEDURE — 7100000009 HC PHASE TWO TIME - INITIAL BASE CHARGE: Performed by: OBSTETRICS & GYNECOLOGY

## 2025-04-30 PROCEDURE — 81025 URINE PREGNANCY TEST: CPT | Performed by: STUDENT IN AN ORGANIZED HEALTH CARE EDUCATION/TRAINING PROGRAM

## 2025-04-30 PROCEDURE — 3700000001 HC GENERAL ANESTHESIA TIME - INITIAL BASE CHARGE: Performed by: OBSTETRICS & GYNECOLOGY

## 2025-04-30 PROCEDURE — 2500000004 HC RX 250 GENERAL PHARMACY W/ HCPCS (ALT 636 FOR OP/ED): Mod: JZ | Performed by: STUDENT IN AN ORGANIZED HEALTH CARE EDUCATION/TRAINING PROGRAM

## 2025-04-30 PROCEDURE — 7100000001 HC RECOVERY ROOM TIME - INITIAL BASE CHARGE: Performed by: OBSTETRICS & GYNECOLOGY

## 2025-04-30 PROCEDURE — 20552 NJX 1/MLT TRIGGER POINT 1/2: CPT | Performed by: OBSTETRICS & GYNECOLOGY

## 2025-04-30 PROCEDURE — 2500000004 HC RX 250 GENERAL PHARMACY W/ HCPCS (ALT 636 FOR OP/ED): Performed by: NURSE ANESTHETIST, CERTIFIED REGISTERED

## 2025-04-30 PROCEDURE — 3600000003 HC OR TIME - INITIAL BASE CHARGE - PROCEDURE LEVEL THREE: Performed by: OBSTETRICS & GYNECOLOGY

## 2025-04-30 PROCEDURE — 3700000002 HC GENERAL ANESTHESIA TIME - EACH INCREMENTAL 1 MINUTE: Performed by: OBSTETRICS & GYNECOLOGY

## 2025-04-30 PROCEDURE — 2500000004 HC RX 250 GENERAL PHARMACY W/ HCPCS (ALT 636 FOR OP/ED): Performed by: STUDENT IN AN ORGANIZED HEALTH CARE EDUCATION/TRAINING PROGRAM

## 2025-04-30 PROCEDURE — 2500000004 HC RX 250 GENERAL PHARMACY W/ HCPCS (ALT 636 FOR OP/ED): Mod: JZ | Performed by: OBSTETRICS & GYNECOLOGY

## 2025-04-30 PROCEDURE — 2500000001 HC RX 250 WO HCPCS SELF ADMINISTERED DRUGS (ALT 637 FOR MEDICARE OP): Performed by: OBSTETRICS & GYNECOLOGY

## 2025-04-30 PROCEDURE — 2500000001 HC RX 250 WO HCPCS SELF ADMINISTERED DRUGS (ALT 637 FOR MEDICARE OP): Performed by: STUDENT IN AN ORGANIZED HEALTH CARE EDUCATION/TRAINING PROGRAM

## 2025-04-30 PROCEDURE — 7100000002 HC RECOVERY ROOM TIME - EACH INCREMENTAL 1 MINUTE: Performed by: OBSTETRICS & GYNECOLOGY

## 2025-04-30 RX ORDER — BUPIVACAINE HYDROCHLORIDE 5 MG/ML
20 INJECTION, SOLUTION PERINEURAL ONCE
Status: DISCONTINUED | OUTPATIENT
Start: 2025-04-30 | End: 2025-04-30 | Stop reason: SDUPTHER

## 2025-04-30 RX ORDER — TRIAMCINOLONE ACETONIDE 40 MG/ML
INJECTION, SUSPENSION INTRA-ARTICULAR; INTRAMUSCULAR AS NEEDED
Status: DISCONTINUED | OUTPATIENT
Start: 2025-04-30 | End: 2025-04-30 | Stop reason: HOSPADM

## 2025-04-30 RX ORDER — GABAPENTIN 300 MG/1
600 CAPSULE ORAL ONCE
Status: COMPLETED | OUTPATIENT
Start: 2025-04-30 | End: 2025-04-30

## 2025-04-30 RX ORDER — TRIAMCINOLONE ACETONIDE 40 MG/ML
40 INJECTION, SUSPENSION INTRA-ARTICULAR; INTRAMUSCULAR ONCE
Status: DISCONTINUED | OUTPATIENT
Start: 2025-04-30 | End: 2025-04-30 | Stop reason: HOSPADM

## 2025-04-30 RX ORDER — LIDOCAINE HYDROCHLORIDE 20 MG/ML
1 JELLY TOPICAL ONCE
Status: DISCONTINUED | OUTPATIENT
Start: 2025-04-30 | End: 2025-04-30 | Stop reason: HOSPADM

## 2025-04-30 RX ORDER — MIDAZOLAM HYDROCHLORIDE 1 MG/ML
INJECTION, SOLUTION INTRAMUSCULAR; INTRAVENOUS AS NEEDED
Status: DISCONTINUED | OUTPATIENT
Start: 2025-04-30 | End: 2025-04-30

## 2025-04-30 RX ORDER — ONDANSETRON HYDROCHLORIDE 2 MG/ML
4 INJECTION, SOLUTION INTRAVENOUS ONCE AS NEEDED
Status: COMPLETED | OUTPATIENT
Start: 2025-04-30 | End: 2025-04-30

## 2025-04-30 RX ORDER — SCOPOLAMINE 1 MG/3D
PATCH, EXTENDED RELEASE TRANSDERMAL
Status: COMPLETED
Start: 2025-04-30 | End: 2025-04-30

## 2025-04-30 RX ORDER — CELECOXIB 100 MG/1
400 CAPSULE ORAL ONCE
Status: COMPLETED | OUTPATIENT
Start: 2025-04-30 | End: 2025-04-30

## 2025-04-30 RX ORDER — LABETALOL HYDROCHLORIDE 5 MG/ML
5 INJECTION, SOLUTION INTRAVENOUS ONCE AS NEEDED
Status: DISCONTINUED | OUTPATIENT
Start: 2025-04-30 | End: 2025-04-30 | Stop reason: HOSPADM

## 2025-04-30 RX ORDER — MIDAZOLAM HYDROCHLORIDE 1 MG/ML
1 INJECTION, SOLUTION INTRAMUSCULAR; INTRAVENOUS ONCE AS NEEDED
Status: DISCONTINUED | OUTPATIENT
Start: 2025-04-30 | End: 2025-04-30 | Stop reason: HOSPADM

## 2025-04-30 RX ORDER — BUPIVACAINE HYDROCHLORIDE 5 MG/ML
30 INJECTION, SOLUTION PERINEURAL ONCE
Status: DISCONTINUED | OUTPATIENT
Start: 2025-04-30 | End: 2025-04-30 | Stop reason: HOSPADM

## 2025-04-30 RX ORDER — LIDOCAINE HYDROCHLORIDE 10 MG/ML
0.1 INJECTION, SOLUTION INFILTRATION; PERINEURAL ONCE
Status: DISCONTINUED | OUTPATIENT
Start: 2025-04-30 | End: 2025-04-30 | Stop reason: HOSPADM

## 2025-04-30 RX ORDER — HYDRALAZINE HYDROCHLORIDE 20 MG/ML
5 INJECTION INTRAMUSCULAR; INTRAVENOUS EVERY 30 MIN PRN
Status: DISCONTINUED | OUTPATIENT
Start: 2025-04-30 | End: 2025-04-30 | Stop reason: HOSPADM

## 2025-04-30 RX ORDER — LIDOCAINE HCL/PF 100 MG/5ML
SYRINGE (ML) INTRAVENOUS AS NEEDED
Status: DISCONTINUED | OUTPATIENT
Start: 2025-04-30 | End: 2025-04-30

## 2025-04-30 RX ORDER — SCOPOLAMINE 1 MG/3D
PATCH, EXTENDED RELEASE TRANSDERMAL AS NEEDED
Status: DISCONTINUED | OUTPATIENT
Start: 2025-04-30 | End: 2025-04-30

## 2025-04-30 RX ORDER — ALBUTEROL SULFATE 0.83 MG/ML
2.5 SOLUTION RESPIRATORY (INHALATION) ONCE AS NEEDED
Status: DISCONTINUED | OUTPATIENT
Start: 2025-04-30 | End: 2025-04-30 | Stop reason: HOSPADM

## 2025-04-30 RX ORDER — BUPIVACAINE HYDROCHLORIDE 5 MG/ML
INJECTION, SOLUTION PERINEURAL AS NEEDED
Status: DISCONTINUED | OUTPATIENT
Start: 2025-04-30 | End: 2025-04-30 | Stop reason: HOSPADM

## 2025-04-30 RX ORDER — SODIUM CHLORIDE, SODIUM LACTATE, POTASSIUM CHLORIDE, CALCIUM CHLORIDE 600; 310; 30; 20 MG/100ML; MG/100ML; MG/100ML; MG/100ML
100 INJECTION, SOLUTION INTRAVENOUS CONTINUOUS
Status: ACTIVE | OUTPATIENT
Start: 2025-04-30 | End: 2025-04-30

## 2025-04-30 RX ORDER — CEFAZOLIN SODIUM 2 G/100ML
2 INJECTION, SOLUTION INTRAVENOUS ONCE
Status: COMPLETED | OUTPATIENT
Start: 2025-04-30 | End: 2025-04-30

## 2025-04-30 RX ORDER — TRIAMCINOLONE ACETONIDE 40 MG/ML
40 INJECTION, SUSPENSION INTRA-ARTICULAR; INTRAMUSCULAR ONCE
Status: DISCONTINUED | OUTPATIENT
Start: 2025-04-30 | End: 2025-04-30 | Stop reason: SDUPTHER

## 2025-04-30 RX ORDER — LORAZEPAM 2 MG/1
2 TABLET ORAL ONCE
Status: COMPLETED | OUTPATIENT
Start: 2025-04-30 | End: 2025-04-30

## 2025-04-30 RX ORDER — DIAZEPAM 2 MG/1
2 TABLET ORAL ONCE
Status: DISCONTINUED | OUTPATIENT
Start: 2025-04-30 | End: 2025-04-30 | Stop reason: HOSPADM

## 2025-04-30 RX ORDER — PROPOFOL 10 MG/ML
INJECTION, EMULSION INTRAVENOUS AS NEEDED
Status: DISCONTINUED | OUTPATIENT
Start: 2025-04-30 | End: 2025-04-30

## 2025-04-30 RX ORDER — LIDOCAINE HYDROCHLORIDE 20 MG/ML
JELLY TOPICAL AS NEEDED
Status: DISCONTINUED | OUTPATIENT
Start: 2025-04-30 | End: 2025-04-30 | Stop reason: HOSPADM

## 2025-04-30 RX ORDER — FENTANYL CITRATE 50 UG/ML
INJECTION, SOLUTION INTRAMUSCULAR; INTRAVENOUS AS NEEDED
Status: DISCONTINUED | OUTPATIENT
Start: 2025-04-30 | End: 2025-04-30

## 2025-04-30 RX ORDER — ACETAMINOPHEN 325 MG/1
650 TABLET ORAL EVERY 4 HOURS PRN
Status: DISCONTINUED | OUTPATIENT
Start: 2025-04-30 | End: 2025-04-30 | Stop reason: HOSPADM

## 2025-04-30 RX ADMIN — FENTANYL CITRATE 25 MCG: 50 INJECTION, SOLUTION INTRAMUSCULAR; INTRAVENOUS at 09:20

## 2025-04-30 RX ADMIN — FENTANYL CITRATE 25 MCG: 50 INJECTION, SOLUTION INTRAMUSCULAR; INTRAVENOUS at 09:10

## 2025-04-30 RX ADMIN — MIDAZOLAM 2 MG: 1 INJECTION INTRAMUSCULAR; INTRAVENOUS at 09:01

## 2025-04-30 RX ADMIN — ONDANSETRON 4 MG: 2 INJECTION INTRAMUSCULAR; INTRAVENOUS at 10:32

## 2025-04-30 RX ADMIN — CELECOXIB 400 MG: 100 CAPSULE ORAL at 08:36

## 2025-04-30 RX ADMIN — CEFAZOLIN SODIUM 2 G: 2 INJECTION, SOLUTION INTRAVENOUS at 09:08

## 2025-04-30 RX ADMIN — SODIUM CHLORIDE: 9 INJECTION, SOLUTION INTRAVENOUS at 09:00

## 2025-04-30 RX ADMIN — PROPOFOL 50 MG: 10 INJECTION, EMULSION INTRAVENOUS at 09:06

## 2025-04-30 RX ADMIN — FENTANYL CITRATE 25 MCG: 50 INJECTION, SOLUTION INTRAMUSCULAR; INTRAVENOUS at 09:06

## 2025-04-30 RX ADMIN — LIDOCAINE HYDROCHLORIDE 60 MG: 20 INJECTION INTRAVENOUS at 09:06

## 2025-04-30 RX ADMIN — PROPOFOL 20 MG: 10 INJECTION, EMULSION INTRAVENOUS at 09:19

## 2025-04-30 RX ADMIN — SCOLOPAMINE TRANSDERMAL SYSTEM 1 PATCH: 1 PATCH, EXTENDED RELEASE TRANSDERMAL at 09:10

## 2025-04-30 RX ADMIN — GABAPENTIN 600 MG: 300 CAPSULE ORAL at 08:36

## 2025-04-30 RX ADMIN — PROPOFOL 30 MG: 10 INJECTION, EMULSION INTRAVENOUS at 09:11

## 2025-04-30 RX ADMIN — PROPOFOL 125 MCG/KG/MIN: 10 INJECTION, EMULSION INTRAVENOUS at 09:07

## 2025-04-30 SDOH — HEALTH STABILITY: MENTAL HEALTH: CURRENT SMOKER: 0

## 2025-04-30 ASSESSMENT — PAIN - FUNCTIONAL ASSESSMENT
PAIN_FUNCTIONAL_ASSESSMENT: 0-10

## 2025-04-30 ASSESSMENT — PAIN SCALES - GENERAL
PAINLEVEL_OUTOF10: 0 - NO PAIN
PAIN_LEVEL: 0

## 2025-04-30 ASSESSMENT — ENCOUNTER SYMPTOMS: DIFFICULTY URINATING: 1

## 2025-04-30 NOTE — OP NOTE
CYSTOSCOPY WITH BLADDER FULGURATION, PELVIC FLOOR TRIGGER POINT INJECTIONS (B), & BOTOX INJECTION (B) Operative Note     Date: 2025  OR Location: [facility]    Name: Tracey Villa, : 2000, Age: 25 y.o., MRN: [id number], Sex: female    Diagnosis  Pre-op Diagnosis      * Pelvic pain [R10.2]     * Interstitial cystitis [N30.10] Post-op Diagnosis     * Pelvic pain [R10.2]     * Interstitial cystitis [N30.10]     Procedures  CYSTOSCOPY WITH BLADDER FULGURATION  83760 - IN CYSTO W/DESTRUCTION OF LESIONS    PELVIC FLOOR TRIGGER POINT INJECTIONS  63930 - IN INJECTION SINGLE/MLT TRIGGER POINT 3/> MUSCLES    & BOTOX INJECTION   - IN INJECTION, ONABOTULINUMTOXINA, 1 UNIT      Surgeons      * Karen Hernandez - Primary    Resident/Fellow/Other Assistant:  Surgeons and Role:  * No surgeons found with a matching role *    Staff:   Amandoulator: Jennifer  Scrub Person: Susanne  Surgical Assistant: Kenisha Kaplan Person: Deanna    Anesthesia Staff: Anesthesiologist: Lincoln Ospina MD  CRNA: TEODORO Chery-CRNA  SRNA: Ian Toscano    Procedure Summary  Anesthesia: Monitor Anesthesia Care  ASA: III  Estimated Blood Loss: 10mL  Intra-op Medications:   Administrations occurring from 0930 to 1050 on 25:   Medication Name Total Dose   scopolamine (Transderm-Scop) patch 1 mg over 3 days  - Omnicell Override Pull Cannot be calculated   propofol (Diprivan) injection 10 mg/mL 59.2 mg   NaCl 0.9 % bolus Cannot be calculated              Anesthesia Record               Intraprocedure I/O Totals          Intake    Dexmedetomidine 0.00 mL    The total shown is the total volume documented since Anesthesia Start was filed.    NaCl 0.9 % bolus 200.00 mL    ceFAZolin (Ancef) 2 g in dextrose (iso)  mL 100.00 mL    Total Intake 300 mL       Output    Est. Blood Loss 10 mL    Total Output 10 mL       Net    Net Volume 290 mL          Specimen: No specimens collected     Drains and/or Catheters: * None in log  *    Tourniquet Times: none       Implants: none    Findings: normal external genitalia    Indications: Tracey Villa is an 25 y.o. female who is having surgery for Pelvic pain [R10.2]  Interstitial cystitis [N30.10].     The patient was seen in the preoperative area. The risks, benefits, complications, treatment options, non-operative alternatives, expected recovery and outcomes were discussed with the patient. The possibilities of reaction to medication, pulmonary aspiration, injury to surrounding structures, bleeding, recurrent infection, the need for additional procedures, failure to diagnose a condition, and creating a complication requiring transfusion or operation were discussed with the patient. The patient concurred with the proposed plan, giving informed consent.  The site of surgery was properly noted/marked if necessary per policy. The patient has been actively warmed in preoperative area. Preoperative antibiotics have been ordered and given within 1 hours of incision. Venous thrombosis prophylaxis have been ordered including bilateral sequential compression devices    Procedure Details: The patient was taken to the OR and a timeout was performed. After anesthesia was found to be adequate, the cystoscope was white balanced and gently inserted into the urethra. The bladder was filled with normal saline and the bladder carefully inspected. Bilateral ureteral orifices were normal. There were no Hunner's lesions.. There were no foreign bodies or particulate matter. A sympathetic response was noted at 700cc of fluid, and hydrodistension was performed for 8 minutes at this pressure. The bladder was then emptied.    10 mL of 0.5% bupivacaine and 40mg/cc kenalog were mixed and a total of 2cc were injected into each pudendal nerve. The remainder was injected into the bilateral levator ani musculature.    100 units of botulinum A toxin was reconstituted in 10 mL of sterile saline. Using a pudendal  injection tray, the solution was then injected into the medial and lateral aspects of the bilateral levator ani muscles. Pressure was applied to the bilateral muscle beds with adequate hemostasis achieved.     Topical 5% lidocaine ointment and a 5mg valium pill were applied vaginally.     The patient was then awakened from anesthesia, having tolerated procedure well, and was taken to the recovery room in stable condition. All sponge, needle, and instrument counts were correct at the end the case.    Evidence of Infection: No   Complications:  None; patient tolerated the procedure well.    Disposition: PACU - hemodynamically stable.  Condition: stable     Dr. Hernandez present for procedure    Emili Abreu MD, Gynecologist  [facility] Sexual [facility] Fellow  Dept of Urology/OBGYN  4/30/2025        Agree with above  Karen Hernandez MD, MPH

## 2025-04-30 NOTE — ANESTHESIA POSTPROCEDURE EVALUATION
Patient: Tracey Villa    Procedure Summary       Date: 04/30/25 Room / Location: [facility] 03 / [facility]    Anesthesia Start: 0900 Anesthesia Stop:     Procedures:       CYSTOSCOPY WITH BLADDER FULGURATION      PELVIC FLOOR TRIGGER POINT INJECTIONS (Bilateral)      & BOTOX INJECTION (Bilateral) Diagnosis:       Pelvic pain      Interstitial cystitis      (Pelvic pain [R10.2])      (Interstitial cystitis [N30.10])    Surgeons: Karen Hernandez MD MPH Responsible Provider: Lincoln Ospina MD    Anesthesia Type: MAC ASA Status: 3            Anesthesia Type: MAC    Vitals Value Taken Time   BP 89/53 04/30/25 09:52   Temp 36.5 04/30/25 09:54   Pulse 65 04/30/25 09:52   Resp 16 04/30/25 09:54   SpO2 100 % 04/30/25 09:52   Vitals shown include unfiled device data.    Anesthesia Post Evaluation    Patient location during evaluation: PACU  Patient participation: complete - patient cannot participate  Level of consciousness: sleepy but conscious  Pain score: 0  Pain management: adequate  Airway patency: patent  Cardiovascular status: acceptable and hemodynamically stable  Respiratory status: acceptable and face mask  Hydration status: acceptable  Postoperative Nausea and Vomiting: none        There were no known notable events for this encounter.

## 2025-04-30 NOTE — DISCHARGE INSTRUCTIONS
What to Do at Home:  Some bleeding may occur after the procedure. This bleeding is typically not heavy. It happens normally as the wound heals. You may also have a discharge for a week or two after the procedure.     You might feel slight discomfort in the vaginal area for a few days after surgery.     Take pain medication alternating between tylenol and ibuprofen every 3 hours. If you begin to feel constipated, take a dose of miralax.     Ice packs may also be used for comfort and to reduce any swelling. If you are having more pain, you may spray the area with a benzocaine spray (purchased over the counter). The medicine is used to numb the skin to reduce pain and discomfort.

## 2025-04-30 NOTE — H&P
History Of Present Illness  Tracey Villa is a 25 y.o. female presenting with chronic pelvic pain who has done well with TPI and PFBotox as well. Also with IC.     Past Medical History  Medical History[1]    Surgical History  Surgical History[2]     Social History  She reports that she has never smoked. She has never used smokeless tobacco. She reports current alcohol use. She reports that she does not use drugs.    Family History  Family History[3]     Allergies  Pyridium [phenazopyridine] and Latex    Review of Systems   Genitourinary:  Positive for difficulty urinating.   All other systems reviewed and are negative.       Physical Exam     Last Recorded Vitals  There were no vitals taken for this visit.    Relevant Results             Assessment & Plan  Pelvic pain    Interstitial cystitis     pelvic floor trigger point and Botox injections and cysto with fulgaration or hydrodistension at her earliest convenience.        Karen Hernandez MD MPH         [1]   Past Medical History:  Diagnosis Date    Acute upper respiratory infection, unspecified 02/09/2018    Adult attention deficit hyperactivity disorder     Anemia     Anxiety     Black tarry stools     Bladder spasms     Bloating     BRBPR (bright red blood per rectum)     Depression 2021    Exposure to influenza 12/22/2014    Fibromyalgia, primary     Food intolerance 2022    GERD (gastroesophageal reflux disease)     Hiatal hernia     History of epistaxis 11/27/2019    History of fever 12/22/2014    History of iron deficiency anemia 08/09/2019    History of pharyngitis 12/22/2014    History of sore throat 02/09/2018    Pediculosis due to pediculus humanus capitis 12/03/2015    Lice infested hair    Pneumonia     Urinary incontinence     Urinary tract infection     Urticaria     Vision loss    [2]   Past Surgical History:  Procedure Laterality Date    COLONOSCOPY      COLPOSCOPY  09/15/24    OTHER SURGICAL HISTORY  02/20/2020    No history of surgery     UPPER GASTROINTESTINAL ENDOSCOPY     [3]   Family History  Problem Relation Name Age of Onset    Other (anxiety and depression) Mother Guadalupe     Hyperlipidemia Mother Guadalupe     Cancer Mother Guadalupe     Depression Mother Guadalupe     Genetic Testing Mother Guadalupe     Hypertension Mother Guadalupe     Mental illness Mother Guadalupe     Asthma Mother Guadalupe     Depression Sister Sophitia     Mental illness Sister Sophitia     Asthma Sister Sophitia     Alcohol abuse Mother's Sister CherylAnne     Alcohol abuse Mother's Sister Queenie     Vision loss Mother's Sister Queenie     Alcohol abuse Mother's Brother Jaro     Drug abuse Father's Brother Antolin     Alcohol abuse Maternal Grandmother Nina     Hypertension Maternal Grandmother Nina     Mental illness Maternal Grandmother Nina     Stroke Maternal Grandmother Nina     Alcohol abuse Maternal Grandfather Jaro     Heart disease Maternal Grandfather Jaro     Hypertension Maternal Grandfather Jaro     Mental illness Maternal Grandfather Jaro     Stroke Maternal Grandfather Jaro     Colon cancer Maternal Grandfather Jaro 50 - 59

## 2025-04-30 NOTE — ANESTHESIA PREPROCEDURE EVALUATION
Patient: Tracey Villa    Procedure Information       Anesthesia Start Date/Time: 04/30/25 0900    Procedures:       CYSTOSCOPY WITH BLADDER FULGURATION      PELVIC FLOOR TRIGGER POINT INJECTIONS (Bilateral)      & BOTOX INJECTION (Bilateral)    Location: [facility] / [facility]    Surgeons: Karen Hernandez MD MPH            Relevant Problems   Anesthesia (within normal limits)      Cardiac   (+) Atypical chest pain   (+) Heterozygous familial hypercholesterolemia   (+) Mixed hyperlipidemia      Neuro   (+) Anxiety   (+) Cervical radiculopathy   (+) Intractable chronic migraine with aura with status migrainosus   (+) Intractable chronic post-traumatic headache   (+) PTSD (post-traumatic stress disorder)      GI   (+) Chronic GERD      Hematology   (+) Iron deficiency anemia      Musculoskeletal   (+) Interstitial cystitis      GYN   (+) Menorrhagia with regular cycle       Clinical information reviewed:    Allergies  Meds     OB Status           NPO Detail:  NPO/Void Status  Date of Last Liquid: 04/30/25  Time of Last Liquid: 0600  Date of Last Solid: 04/29/25  Time of Last Solid: 1900  Last Intake Type: Clear fluids         Physical Exam    Airway  Mallampati: II  TM distance: >3 FB  Mouth opening: 3 or more finger widths     Cardiovascular   Rhythm: regular     Dental    Pulmonary    Abdominal            Anesthesia Plan    History of general anesthesia?: yes  History of complications of general anesthesia?: no    ASA 3     MAC     The patient is not a current smoker.  Patient was not previously instructed to abstain from smoking on day of procedure.  Patient did not smoke on day of procedure.    intravenous induction   Anesthetic plan and risks discussed with patient.  Use of blood products discussed with patient who.    Plan discussed with CRNA.

## 2025-05-04 DIAGNOSIS — G43.E11 INTRACTABLE CHRONIC MIGRAINE WITH AURA WITH STATUS MIGRAINOSUS: ICD-10-CM

## 2025-05-05 RX ORDER — PROPRANOLOL HYDROCHLORIDE 40 MG/1
40 TABLET ORAL 2 TIMES DAILY
Qty: 60 TABLET | Refills: 0 | Status: SHIPPED | OUTPATIENT
Start: 2025-05-05

## 2025-05-07 DIAGNOSIS — K58.1 IRRITABLE BOWEL SYNDROME WITH CONSTIPATION: Primary | ICD-10-CM

## 2025-05-07 RX ORDER — LACTULOSE 10 G/15ML
20 SOLUTION ORAL; RECTAL 4 TIMES DAILY PRN
Qty: 473 ML | Refills: 0 | Status: SHIPPED | OUTPATIENT
Start: 2025-05-07

## 2025-05-14 DIAGNOSIS — R35.0 URINARY FREQUENCY: Primary | ICD-10-CM

## 2025-05-14 DIAGNOSIS — R30.0 DYSURIA: ICD-10-CM

## 2025-05-19 ENCOUNTER — APPOINTMENT (OUTPATIENT)
Dept: GASTROENTEROLOGY | Facility: CLINIC | Age: 25
End: 2025-05-19
Payer: MEDICAID

## 2025-05-19 VITALS
SYSTOLIC BLOOD PRESSURE: 143 MMHG | BODY MASS INDEX: 25.72 KG/M2 | WEIGHT: 163.9 LBS | RESPIRATION RATE: 16 BRPM | OXYGEN SATURATION: 99 % | DIASTOLIC BLOOD PRESSURE: 70 MMHG | HEIGHT: 67 IN | HEART RATE: 73 BPM

## 2025-05-19 DIAGNOSIS — K60.2 ANAL FISSURE: ICD-10-CM

## 2025-05-19 DIAGNOSIS — K58.1 IRRITABLE BOWEL SYNDROME WITH CONSTIPATION: Primary | ICD-10-CM

## 2025-05-19 PROCEDURE — 3008F BODY MASS INDEX DOCD: CPT

## 2025-05-19 PROCEDURE — 99214 OFFICE O/P EST MOD 30 MIN: CPT

## 2025-05-19 PROCEDURE — 1036F TOBACCO NON-USER: CPT

## 2025-05-19 RX ORDER — NITROGLYCERIN 4 MG/G
1 OINTMENT RECTAL EVERY 12 HOURS SCHEDULED
Qty: 30 G | Refills: 0 | Status: SHIPPED | OUTPATIENT
Start: 2025-05-19 | End: 2025-06-18

## 2025-05-19 RX ORDER — POLYETHYLENE GLYCOL 3350, SODIUM CHLORIDE, SODIUM BICARBONATE, POTASSIUM CHLORIDE 420; 11.2; 5.72; 1.48 G/4L; G/4L; G/4L; G/4L
4000 POWDER, FOR SOLUTION ORAL ONCE
Qty: 4000 ML | Refills: 0 | Status: SHIPPED | OUTPATIENT
Start: 2025-05-19 | End: 2025-05-19

## 2025-05-19 ASSESSMENT — PATIENT HEALTH QUESTIONNAIRE - PHQ9
1. LITTLE INTEREST OR PLEASURE IN DOING THINGS: NOT AT ALL
SUM OF ALL RESPONSES TO PHQ9 QUESTIONS 1 AND 2: 0
2. FEELING DOWN, DEPRESSED OR HOPELESS: NOT AT ALL

## 2025-05-19 ASSESSMENT — ENCOUNTER SYMPTOMS
BLOOD IN STOOL: 0
RECTAL PAIN: 1
ABDOMINAL PAIN: 1
CONSTIPATION: 1
ROS GI COMMENTS: SEE HPI

## 2025-05-19 ASSESSMENT — PAIN SCALES - GENERAL: PAINLEVEL_OUTOF10: 0-NO PAIN

## 2025-05-19 NOTE — PATIENT INSTRUCTIONS
Start Rectiv twice daily for 30 days  Sitz baths twice daily   Continue the Anusol as needed for hemorrhoids  Recommend seeing colon and rectal surgery if symptoms persist. Dr. Ruiz is in [place]    Repeat NuLYTELY bowel prep for cleanout  Continue Linzess for now  Continue the lactulose syrup as needed  Consider pelvic floor testing in future    Follow up in 3 months

## 2025-05-19 NOTE — PROGRESS NOTES
History Of Present Illness  Tracey Villa is a 25 y.o. female presenting to GI clinic for follow up IBS-C, bloating, anal pain.    Patient's bloating is better since starting Linzess. She also found her recent bladder procedure to help her bloating, however patient complains of continued constipation. Prior to her colonoscopy, she was doing well on Linzess once daily, but after colonoscopy she developed constipation again, so lactulose was added.  She is using lactulose daily.     She states she is still only moving bowels 1-2 times a week, but has noticed some improvement with added lactulose.  Stool is a muddy consistency, denies black stools or blood in stool. She has sensation of fullness due to constipation and has LLQ pain when she is more constipated which is relieved after a bowel movement.  She had some incontinence of stool after starting lactulose a few times recently, which occurred last weekend.      She also reports burning anal pain with bms, even if stool is loose.  She finds that the Anusol to be helpful for this.  She still has anal spasms intermittently      Past medical history  Medical History[1]   Surgical History[2]   Social History  She reports that she has never smoked. She has never used smokeless tobacco. She reports current alcohol use. She reports that she does not use drugs.  She does not take NSAIDs on a regular basis.     Family History  Family History[3]  The patient does have a FH of CRC. she does not have a FH of IBD    Review of Systems   Gastrointestinal:  Positive for abdominal pain, constipation and rectal pain. Negative for blood in stool.        See HPI   All other systems reviewed and are negative.        Physical Exam  Constitutional:       General: She is not in acute distress.     Appearance: Normal appearance. She is normal weight. She is not ill-appearing.   HENT:      Head: Normocephalic and atraumatic.      Mouth/Throat:      Mouth: Mucous membranes are  "moist.   Eyes:      General: No scleral icterus.     Conjunctiva/sclera: Conjunctivae normal.      Pupils: Pupils are equal, round, and reactive to light.   Pulmonary:      Effort: Pulmonary effort is normal.   Abdominal:      General: Bowel sounds are normal. There is no distension.      Palpations: Abdomen is soft. There is no mass.      Tenderness: There is no abdominal tenderness.      Hernia: No hernia is present.   Musculoskeletal:         General: Normal range of motion.      Cervical back: Normal range of motion.   Skin:     General: Skin is warm and dry.      Coloration: Skin is not jaundiced or pale.   Neurological:      Mental Status: She is alert. Mental status is at baseline.   Psychiatric:         Mood and Affect: Mood normal.         Behavior: Behavior normal.          Last Vital Signs  /70 (BP Location: Left arm, Patient Position: Sitting, BP Cuff Size: Adult)   Pulse 73   Resp 16   Ht 1.702 m (5' 7\")   Wt 74.3 kg (163 lb 14.4 oz)   SpO2 99%   BMI 25.67 kg/m²      Relevant Results  Lab Results   Component Value Date    WBC 3.9 04/28/2025    HGB 12.9 04/28/2025    HCT 39.2 04/28/2025    MCV 93.6 04/28/2025     04/28/2025       Lab Results   Component Value Date    GLUCOSE 87 04/28/2025    CALCIUM 9.5 04/28/2025     04/28/2025    K 4.5 04/28/2025    CO2 22 04/28/2025     04/28/2025    BUN 22 04/28/2025    CREATININE 0.83 04/28/2025       Lab Results   Component Value Date    ALT 12 04/28/2025    AST 15 04/28/2025    ALKPHOS 92 04/28/2025    BILITOT 0.5 04/28/2025     No results found for: \"IRON\", \"TIBC\", \"IRONSAT\", \"FERRITIN\", \"LNVJMGVG29\", \"FOLATE\"  No results found for: \"CALPS\"   No results found for: \"CRP\"  No results found for: \"LIPASE\"    Lab Results   Component Value Date    HGBA1C 5.3 05/31/2023      Hx POTS, PTSD     Completed allergy testing with Dr. Sher 1/22/2025- FOOD SCRATCH SKIN TESTING:  No allergies to EGG, COW'S MILK, SOY, WHEAT, CODFISH, SHRIMP, " WALNUT, AND PEANUT. Negative to chocolate and tomato.  But + control low;   The histamine control was low limiting the ability to detect low positive allergy results.    EGD/COLONOSCOPY/COLOGUARD  EGD 4/14/2025 with Dr. Montenegro   Findings  Regular Z-line 40 cm from the incisors  Mild, localized erythematous mucosa in the antrum; performed cold forceps biopsy to rule out H. pylori  Two benign-appearing polyps measuring smaller than 5 mm in the body of the stomach; performed cold forceps biopsy  Question of tiny hiatal hernia  The duodenum appeared normal. Performed random biopsy using biopsy forceps to rule out celiac disease.  FINAL DIAGNOSIS   A. DUODENUM SECOND PART, BIOPSY:     Duodenal mucosa with no significant histopathologic change.       B. STOMACH, BIOPSY:     Gastric fundic type mucosa with mild chronic gastritis.    Separate fragment of intestinal type mucosa, see comment.     Comment: H. pylori immunostain is negative.    Specimen includes a separate small fragment of intestinal type mucosa, this may represent gastric mucosa with intestinal metaplasia or more likely carryover from specimen part A.  Clinical correlation is recommended.     C. STOMACH, POLYPECTOMY:    Fundic gland polyp.            Colonoscopy 4/14/2025 with Dr. Montenegro Findings  All observed locations appeared normal, including the terminal ileum, ileocecal valve and cecum.  Internal small hemorrhoids observed during retroflexion; no bleeding was observed    MOST RECENT PERTINENT IMAGING  Xray abdomen 1/16/2025- IMPRESSION:  1.  Nonobstructive bowel gas pattern.     US abdomen 8/22/2022- IMPRESSION:  Unremarkable ultrasound of the abdomen.      Assessment/Plan   Assessment & Plan  Irritable bowel syndrome with constipation  Was doing well on daily Linzess until recent colonoscopy and bladder procedure. Now has persistent constipation despite Linzess, lactulose.  Will have patient complete bowel cleanout, continue Linzess daily and  lactulose 4 times daily as needed  Consider Trulance, Ibsrela  May need to undergo pelvic floor testing  Orders:    polyethylene glycol-electrolytes (Nulytely Lemon-Lime) 420 gram solution; Take 4,000 mL by mouth 1 time for 1 dose.    Anal fissure  Will have patient start Rectiv twice daily x 30 days  Sitz bath's  Referral to colorectal surgery if ineffective  Orders:    nitroglycerin (Rectiv) 0.4 % (w/w) rectal ointment; Insert 1 Application into the rectum every 12 hours.    3-month follow-up        Marian Hartley, TEODORO-CNP  05/19/25          [1]   Past Medical History:  Diagnosis Date    Acute upper respiratory infection, unspecified 02/09/2018    Adult attention deficit hyperactivity disorder     Anemia     Anxiety     Black tarry stools     Bladder spasms     Bloating     BRBPR (bright red blood per rectum)     Chronic constipation     Depression 2021    Exposure to influenza 12/22/2014    Fibromyalgia, primary     Food intolerance 2022    GERD (gastroesophageal reflux disease)     Hiatal hernia     History of epistaxis 11/27/2019    History of fever 12/22/2014    History of iron deficiency anemia 08/09/2019    History of pharyngitis 12/22/2014    History of sore throat 02/09/2018    Interstitial cystitis     Pediculosis due to pediculus humanus capitis 12/03/2015    Lice infested hair    Pneumonia     Urinary incontinence     Urinary tract infection     Urticaria     Vision loss    [2]   Past Surgical History:  Procedure Laterality Date    COLONOSCOPY      COLPOSCOPY  09/15/24    CYSTOSCOPY      OTHER SURGICAL HISTORY  02/20/2020    No history of surgery    UPPER GASTROINTESTINAL ENDOSCOPY     [3]   Family History  Problem Relation Name Age of Onset    Other (anxiety and depression) Mother Guadalupe     Hyperlipidemia Mother Guadalupe     Cancer Mother Guadalupe     Depression Mother Guadalupe     Genetic Testing Mother Guadalupe     Hypertension Mother Guadalupe     Mental illness Mother Guadalupe     Asthma Mother Guadalupe     Acne  Mother Guadalupe     Depression Sister Sophitia     Mental illness Sister Sophitia     Asthma Sister Sophitia     Acne Sister Sophitia     Alcohol abuse Mother's Sister LudyAnne     Alcohol abuse Mother's Sister Queenie     Vision loss Mother's Sister Queenie     Alcohol abuse Mother's Brother Jaro     Drug abuse Father's Brother Antolin     Alcohol abuse Maternal Grandmother Nina     Hypertension Maternal Grandmother Nina     Mental illness Maternal Grandmother Nina     Stroke Maternal Grandmother Nina     Alcohol abuse Maternal Grandfather Jaro     Heart disease Maternal Grandfather Jaro     Hypertension Maternal Grandfather Jaro     Mental illness Maternal Grandfather Jaro     Stroke Maternal Grandfather Jaro     Colon cancer Maternal Grandfather Jaro 50 - 59

## 2025-05-22 DIAGNOSIS — G43.E11 INTRACTABLE CHRONIC MIGRAINE WITH AURA WITH STATUS MIGRAINOSUS: Primary | ICD-10-CM

## 2025-05-22 RX ORDER — METHYLPREDNISOLONE 4 MG/1
TABLET ORAL
Qty: 21 TABLET | Refills: 0 | Status: SHIPPED | OUTPATIENT
Start: 2025-05-22 | End: 2025-05-28

## 2025-05-28 NOTE — PROGRESS NOTES
FOLLOW-UP VISIT       HISTORY OF PRESENT ILLNESS:   Tracey Villa is a 25 y.o. female with a history of pelvic pain and IC, patient is s/p cystoscopy with bladder fulguration, pelvic floor trigger point injections, and pelvic floor Botox injections on 4/30/25.     PAST MEDICAL HISTORY:  Medical History[1]    PAST SURGICAL HISTORY:  Surgical History[2]     ALLERGIES:   Allergies[3]     MEDICATIONS:   Medication Documentation Review Audit       Reviewed by SANTOS Jasmine (Nurse Practitioner) on 05/19/25 at 1358      Medication Order Taking? Sig Documenting Provider Last Dose Status   amitriptyline (Elavil) 10 mg tablet [id number] Yes Take 4 tablets (40 mg) by mouth once daily at bedtime. Bibiana Silva PA-C  Active    Patient not taking:   Discontinued 05/14/25 1107   cyclobenzaprine (Flexeril) 5 mg tablet [id number] Yes Take 1-2 tablets (5-10 mg) by mouth every 8 hours if needed for muscle spasms. Bibiana Silva PA-C  Active    Patient not taking:   Discontinued 05/14/25 1107   drospirenone, contraceptive, (Slynd) 4 mg (28) tablet [id number] Yes Take 1 tablet by mouth once daily. SANTOS Westbrook  Active    Patient not taking:   Discontinued 05/14/25 1107    Patient not taking:   Discontinued 05/14/25 1107   fluocinonide (Lidex) 0.05 % external solution [id number] Yes Use once a day to the affected area on the scalp. Use as needed. Avoid face and groin. My Mary, DO  Active    Patient not taking:   Discontinued 05/14/25 1107    Patient not taking:   Discontinued 05/14/25 1107   hydrocortisone (Anusol-HC) 2.5 % rectal cream [id number] Yes Insert into the rectum 2 times a day for 10 days. SANTOS Jasmine  Active   hydrOXYzine HCL (Atarax) 25 mg tablet [id number] Yes Take 1 tablet (25 mg) by mouth once daily at bedtime. SANTOS Regalado  Active   ketoconazole (NIZOral) 2 % shampoo [id number] Yes Use 2-3x times a week. Leave on for 5 minutes before rinsing off. My Mary, DO   Active   lactulose 20 gram/30 mL oral solution [id number] Yes Take 30 mL (20 g) by mouth 4 times a day as needed (constipation). SANTOS Jasmine  Active   linaCLOtide (Linzess) 290 mcg capsule [id number] Yes Take 1 capsule (290 mcg) by mouth once daily in the morning. Take before meals. Do not crush or chew. SANTOS Jasmine  Active   magnesium aspart,citrate,oxide 400 mg magnesium capsule [id number] Yes Take by mouth. Historical Provider, MD  Active   methen-m.blue-s.phos-phsal-hyo 118-10-40.8-36 mg capsule [id number]  Take by mouth.   Patient not taking: Reported on 5/19/2025    Historical Provider, MD  Active   omeprazole (PriLOSEC) 40 mg DR capsule [id number] Yes TAKE 1 CAPSULE BY MOUTH ONCE DAILY IN THE MORNING BEFORE MEAL(S) Bibiana Silva PA-C  Active   ondansetron ODT (Zofran-ODT) 4 mg disintegrating tablet [id number] Yes Take 1 tablet (4 mg) by mouth every 6 hours if needed for nausea. Bibiana Silva PA-C  Active   propranolol (Inderal) 40 mg tablet [id number] Yes Take 1 tablet by mouth twice daily Bibiana Silva PA-C  Active   tamsulosin (Flomax) 0.4 mg 24 hr capsule [id number] Yes Take 2 capsules (0.8 mg) by mouth once daily at bedtime. SANTOS Regalado  Active   traZODone (Desyrel) 50 mg tablet [id number] Yes Take 3 tablets (150 mg) by mouth as needed at bedtime for sleep. Historical Provider, MD  Active   triamcinolone (Kenalog) 0.1 % cream [id number] Yes Apply topically 2 times a day. Apply to affected area 1-2 times daily as needed. Avoid face and groin. Rika Young PA-C  Active   ubrogepant (Ubrelvy) 100 mg tablet tablet [id number] Yes Take 1 tablet (100 mg) by mouth if needed (Take one tablet at onset of migraine. If still present 2 hrs later, can take a second tablet.). Bibiana Silva PA-C  Active   vibegron (Gemtesa) 75 mg tablet [id number] Yes Take 1 tablet (75 mg) by mouth once daily. Historical Provider, MD  Active                     SOCIAL HISTORY:  Patient  reports that  she has never smoked. She has never used smokeless tobacco. She reports current alcohol use. She reports that she does not use drugs.   Social History     Socioeconomic History    Marital status: Single     Spouse name: Not on file    Number of children: Not on file    Years of education: Not on file    Highest education level: Not on file   Occupational History    Not on file   Tobacco Use    Smoking status: Never    Smokeless tobacco: Never   Vaping Use    Vaping status: Never Used   Substance and Sexual Activity    Alcohol use: Yes     Comment: Social    Drug use: Never    Sexual activity: Not Currently     Partners: Male     Birth control/protection: Condom Male, Emergency Contraception   Other Topics Concern    Not on file   Social History Narrative    Not on file     Social Drivers of Health     Financial Resource Strain: Not on file   Food Insecurity: Not on file   Transportation Needs: Not on file   Physical Activity: Not on file   Stress: Not on file   Social Connections: Not on file   Intimate Partner Violence: Not on file   Housing Stability: Not on file       FAMILY HISTORY:  Family History[4]    REVIEW OF SYSTEMS:  Constitutional: Negative for fever and chills. Denies anorexia, weight loss.  Eyes: Negative for visual disturbance.   Respiratory: Negative for shortness of breath.    Cardiovascular: Negative for chest pain.   Gastrointestinal: Negative for nausea and vomiting.   Genitourinary: See interval history above.  Skin: Negative for rash.   Neurological: Negative for dizziness and numbness.   Psychiatric/Behavioral: Negative for confusion and decreased concentration.     PHYSICAL EXAM:  There were no vitals taken for this visit.  Constitutional: Patient appears well-developed and well-nourished. No distress.    Head: Normocephalic and atraumatic.    Neck: Normal range of motion.    Cardiovascular: Normal rate.    Pulmonary/Chest: Effort normal. No respiratory distress.   Musculoskeletal: Normal  range of motion.    Neurological: Alert and oriented to person, place, and time.  Psychiatric: Normal mood and affect. Behavior is normal. Thought content normal.       Assessment:      No diagnosis found.    Tracey Villa is a 25 y.o. female with ***     Plan:   ***      I spent *** minutes of dedicated E&M time, including preparation and review of records, notes, and data, time spent with patient/family, and documentation.      [unfilled]    Scribe Attestation  By signing my name below, I, Artur Boykin   attest that this documentation has been prepared under the direction and in the presence of Karen Hernandez MD MPH.        [1]   Past Medical History:  Diagnosis Date    Acute upper respiratory infection, unspecified 02/09/2018    Adult attention deficit hyperactivity disorder     Anemia     Anxiety     Black tarry stools     Bladder spasms     Bloating     BRBPR (bright red blood per rectum)     Chronic constipation     Depression 2021    Exposure to influenza 12/22/2014    Fibromyalgia, primary     Food intolerance 2022    GERD (gastroesophageal reflux disease)     Hiatal hernia     History of epistaxis 11/27/2019    History of fever 12/22/2014    History of iron deficiency anemia 08/09/2019    History of pharyngitis 12/22/2014    History of sore throat 02/09/2018    Interstitial cystitis     Pediculosis due to pediculus humanus capitis 12/03/2015    Lice infested hair    Pneumonia     Urinary incontinence     Urinary tract infection     Urticaria     Vision loss    [2]   Past Surgical History:  Procedure Laterality Date    COLONOSCOPY      COLPOSCOPY  09/15/24    CYSTOSCOPY      OTHER SURGICAL HISTORY  02/20/2020    No history of surgery    UPPER GASTROINTESTINAL ENDOSCOPY     [3]   Allergies  Allergen Reactions    Pyridium [Phenazopyridine] Other    Latex Hives   [4]   Family History  Problem Relation Name Age of Onset    Other (anxiety and depression) Mother Guadalupe     Hyperlipidemia Mother  Guadalupe     Cancer Mother Guadalupe     Depression Mother Guadalupe     Genetic Testing Mother Guadalupe     Hypertension Mother Guadalupe     Mental illness Mother Guadalupe     Asthma Mother Guadalupe     Acne Mother Guadalupe     Depression Sister Sophitia     Mental illness Sister Sophitia     Asthma Sister Sophitia     Acne Sister Sophitia     Alcohol abuse Mother's Sister CherylAnne     Alcohol abuse Mother's Sister Queenie     Vision loss Mother's Sister Queenie     Alcohol abuse Mother's Brother Jaro     Drug abuse Father's Brother Antolin     Alcohol abuse Maternal Grandmother Nina     Hypertension Maternal Grandmother Nina     Mental illness Maternal Grandmother Nina     Stroke Maternal Grandmother Nina     Alcohol abuse Maternal Grandfather Jaro     Heart disease Maternal Grandfather Jaro     Hypertension Maternal Grandfather Jaro     Mental illness Maternal Grandfather Jaro     Stroke Maternal Grandfather Jaro     Colon cancer Maternal Grandfather Jaro 50 - 59

## 2025-05-29 ENCOUNTER — APPOINTMENT (OUTPATIENT)
Dept: UROLOGY | Facility: CLINIC | Age: 25
End: 2025-05-29
Payer: MEDICAID

## 2025-05-29 ENCOUNTER — OFFICE VISIT (OUTPATIENT)
Dept: PRIMARY CARE | Facility: CLINIC | Age: 25
End: 2025-05-29
Payer: MEDICAID

## 2025-05-29 VITALS
SYSTOLIC BLOOD PRESSURE: 112 MMHG | BODY MASS INDEX: 26.16 KG/M2 | OXYGEN SATURATION: 98 % | WEIGHT: 167 LBS | TEMPERATURE: 98 F | HEART RATE: 95 BPM | DIASTOLIC BLOOD PRESSURE: 72 MMHG

## 2025-05-29 DIAGNOSIS — J06.9 VIRAL UPPER RESPIRATORY TRACT INFECTION: Primary | ICD-10-CM

## 2025-05-29 ASSESSMENT — ENCOUNTER SYMPTOMS: SORE THROAT: 1

## 2025-05-29 NOTE — PROGRESS NOTES
Subjective   Patient ID: Tracey Villa is a 25 y.o. female who presents for Sore Throat (Sore throat started a few days ago, worse today.  She started this morning with shortness of breath. Chilling and then sweating last night.).    Sore Throat          Acute sick appt     Tickle in her throat for a few days   All day yesterday -   Sore throat  Nasal congestion and head congestion started today     Just off steroids     Facial pain for 1 - 2 weeks   Right side   Ears hurt too     Graduation party is Saturday  - wants to know if she is contagious     Review of Systems   HENT:  Positive for sore throat.        Objective   /72 (BP Location: Left arm, Patient Position: Sitting, BP Cuff Size: Adult)   Pulse 95   Temp 36.7 °C (98 °F) (Axillary)   Wt 75.8 kg (167 lb)   SpO2 98%   BMI 26.16 kg/m²     Physical Exam  Vitals reviewed.   Constitutional:       Appearance: Normal appearance. She is normal weight.   HENT:      Head: Normocephalic and atraumatic.      Nose: Rhinorrhea (clear mucous nares) present. No congestion.      Mouth/Throat:      Mouth: Mucous membranes are moist.      Pharynx: Oropharynx is clear. No oropharyngeal exudate or posterior oropharyngeal erythema.   Eyes:      Conjunctiva/sclera: Conjunctivae normal.      Pupils: Pupils are equal, round, and reactive to light.   Cardiovascular:      Rate and Rhythm: Normal rate and regular rhythm.   Pulmonary:      Effort: Pulmonary effort is normal.      Breath sounds: Normal breath sounds.   Musculoskeletal:      Cervical back: Tenderness (right ant cerv LAD) present.   Neurological:      Mental Status: She is alert.         Assessment/Plan   Assessment & Plan  Viral upper respiratory tract infection    Discussed allergy vs viral -   But no signs of bacterial infection today   To try antihistamine - avoid sudafed       Discussed with her that if that does not help -   Then likely viral     We discussed at visit any disease processes that  were of concern as well as the risks, benefits and instructions of any new medication provided.    See orders and discussion section for information provided to patient in their After Visit Summary.   Patient (and/or caretaker of patient if present)  stated all questions were answered, and they voiced understanding of instructions.

## 2025-05-29 NOTE — PATIENT INSTRUCTIONS
For allergy symptoms  -   You can try the over the counter long acting antihistamines :   Claritin  ( loratadine)  or Allegra  (fexofenadine) or Zyrtec (cetirizine) or Xyzal  (levo-cetirizine).   You could also try the steroid nasal sprays:   Fluticasone (Flonase),  Nasacort, or Rhinocort.  Be sure to use them as directed.   If you  do use them,  after you insert them into your nose,   tilt outward toward your eye to avoid nose bleeds.   You may need to stay on these through your allergy season.   You might need only one of these,   but some people need both to control symptoms.    If you try these things for 2 weeks,  and they do not help,  stop them and make an appointment in the office.      Also - for  allergy eye symptoms   (redness, itch , watering)    -  you can try Olopatadine  - an over the counter eye drop that works very well.       TREATMENT FOR SINUSITIS AND UPPER RESPIRATORY INFECTIONS:     Drink plenty of fluids, especially water.     Used humidifiers, steam, hot liquids to moisten your nasal passages.      Saline nasal spray often helps,  Simply Saline is a nice over the counter saline spray that you can use as much as you want.     Mucinex or guaifenesin is an over the counter medication that often helps loosen the mucous.  DO NOT USE IN CHILDREN UNDER 6 YEARS OF AGE.      Please be sure to call or follow-up if you are not better in 5-10 days, or if you are worsening.      The most common cause of upper respiratory infections are viruses - which no not need an antibiotic to get better.   We want your own immune system to fight the virus so you or your child develop immunity to it.    However,  people can develop pneumonia, sinus infections and sometimes ear infections from a virus  - which may need an antibiotic.   So if you are showing signs of breathing issues,  or severe ear pain or facial pain with fevers, of if you are no better after 10 days , its important that you contact us.        If you  are prescribed an antibiotic,  it's a good idea to take a probiotic to help prevent diarrhea and yeast infections.  This would be eating a yogurt daily or taking something like acidophillus or Culturelle.

## 2025-05-31 DIAGNOSIS — G43.E11 INTRACTABLE CHRONIC MIGRAINE WITH AURA WITH STATUS MIGRAINOSUS: ICD-10-CM

## 2025-06-02 RX ORDER — PROPRANOLOL HYDROCHLORIDE 40 MG/1
40 TABLET ORAL 2 TIMES DAILY
Qty: 60 TABLET | Refills: 0 | Status: SHIPPED | OUTPATIENT
Start: 2025-06-02

## 2025-06-10 DIAGNOSIS — G43.809 OTHER MIGRAINE WITHOUT STATUS MIGRAINOSUS, NOT INTRACTABLE: Primary | ICD-10-CM

## 2025-06-10 RX ORDER — FREMANEZUMAB-VFRM 225 MG/1.5ML
225 INJECTION SUBCUTANEOUS
Qty: 1.5 ML | Refills: 11 | Status: SHIPPED | OUTPATIENT
Start: 2025-06-10

## 2025-06-10 RX ORDER — FREMANEZUMAB-VFRM 225 MG/1.5ML
225 INJECTION SUBCUTANEOUS
COMMUNITY
End: 2025-06-10 | Stop reason: SINTOL

## 2025-06-16 ENCOUNTER — APPOINTMENT (OUTPATIENT)
Dept: GASTROENTEROLOGY | Facility: CLINIC | Age: 25
End: 2025-06-16
Payer: MEDICAID

## 2025-06-19 ENCOUNTER — DOCUMENTATION (OUTPATIENT)
Dept: PRIMARY CARE | Facility: CLINIC | Age: 25
End: 2025-06-19
Payer: MEDICAID

## 2025-06-19 DIAGNOSIS — G43.909 EPISODIC MIGRAINE: ICD-10-CM

## 2025-06-19 DIAGNOSIS — E78.01 HETEROZYGOUS FAMILIAL HYPERCHOLESTEROLEMIA: ICD-10-CM

## 2025-07-03 DIAGNOSIS — K21.9 CHRONIC GERD: ICD-10-CM

## 2025-07-03 RX ORDER — OMEPRAZOLE 40 MG/1
40 CAPSULE, DELAYED RELEASE ORAL
Qty: 90 CAPSULE | Refills: 0 | Status: SHIPPED | OUTPATIENT
Start: 2025-07-03

## 2025-07-06 DIAGNOSIS — G43.E11 INTRACTABLE CHRONIC MIGRAINE WITH AURA WITH STATUS MIGRAINOSUS: ICD-10-CM

## 2025-07-07 RX ORDER — PROPRANOLOL HYDROCHLORIDE 40 MG/1
40 TABLET ORAL 2 TIMES DAILY
Qty: 60 TABLET | Refills: 0 | Status: SHIPPED | OUTPATIENT
Start: 2025-07-07

## 2025-07-11 DIAGNOSIS — G43.809 OTHER MIGRAINE WITHOUT STATUS MIGRAINOSUS, NOT INTRACTABLE: ICD-10-CM

## 2025-07-11 RX ORDER — FREMANEZUMAB-VFRM 225 MG/1.5ML
225 INJECTION SUBCUTANEOUS
Qty: 1.5 ML | Refills: 11 | Status: SHIPPED | OUTPATIENT
Start: 2025-07-11

## 2025-07-14 DIAGNOSIS — G43.901 STATUS MIGRAINOSUS: Primary | ICD-10-CM

## 2025-07-14 RX ORDER — METHYLPREDNISOLONE 4 MG/1
TABLET ORAL
Qty: 21 TABLET | Refills: 0 | Status: SHIPPED | OUTPATIENT
Start: 2025-07-14 | End: 2025-07-20

## 2025-08-01 ENCOUNTER — TELEMEDICINE (OUTPATIENT)
Dept: PRIMARY CARE | Facility: CLINIC | Age: 25
End: 2025-08-01
Payer: MEDICAID

## 2025-08-01 DIAGNOSIS — G47.33 OSA (OBSTRUCTIVE SLEEP APNEA): Primary | ICD-10-CM

## 2025-08-01 DIAGNOSIS — R63.5 WEIGHT GAIN: ICD-10-CM

## 2025-08-01 NOTE — PROGRESS NOTES
Subjective   Patient ID: Tracey Villa is a 25 y.o. female who presents for Weight Loss.  HPI  -has been gaining weight since the end of february , also feels like she just cant lose   - only med change was adding ajovy  - tsh, glucose normal   - normal day of eatin-3 plain waffles and activa yogurt, then for lunch whatever is being served at work (typically chicken didi, chicken patties) and dinner whatever mom makes (typically chicken, fish, pork, veggies). Her mom has been doing a nutrition program for weight loss. Doesnt snack much. No alcohol   - most of her movement comes at work- at a . Doesnt exercise much outside of this.   - she is on amitriptyline 40, propranolol, trazodone, birth control  - feels like overall depression and fatigue are worse, too.   Current Medications[1]   Surgical History[2]   Medical History[3]  Social History[4]   Family History[5]   Review of Systems  10 point ROS negative except as otherwise noted in the HPI.      Objective   There were no vitals taken for this visit.   Physical Exam    Limited exam due to nature of virtual visit.      Assessment/Plan   Problem List Items Addressed This Visit    None  Visit Diagnoses         RANDY (obstructive sleep apnea)    -  Primary    Relevant Orders    Home sleep apnea test (HSAT)      Weight gain      - Pt has been gaining weight since about february. She is trying to eat well. Mom is doing a nutrition program and she is eating similar to her at least for dinner. She is not doing much exercise outside of her job at a . She has had worsening fatigue and depression lately.   - etiology unclear, likely multifactorial. TSH, glucose normal. She is on a high dose of amitriptyline as well as trazodone, birth control, and propranolol and wonder if all together may be causing her weight gain or slow metabolism-- TCA primarily. Also feel she likely has uncontrolled depression and sleep apnea. Some of it may be lifestyle as  "well.   - rec talking to her psychiatrist about wellbutrin and/or contrave as an addition to her regimen to improve depression, combat weight gain side effects of amitriptyline, give her a little \"get up and go\" -- let me know what he says   - trial myfitnesspal and keeping better track of caloric intake and caloric deficit  - sleep study ordered   - discussed nutrition referral, she defers for now             Virtual or Telephone Consent    While technically available, the patient was unable or unwilling to consent to connect via audio/video telehealth technology; therefore, I performed this visit using a real-time audio only connection between Tracey Villa & Bibiana Silva PA-C.  Verbal consent was requested and obtained from Tracey Villa on this date, 08/01/25 for a telehealth visit and the patient's location was confirmed at the time of the visit.    I discussed with the patient the potential benefits and risks of the use of telephone or video-conferencing that differ from in-person services (e.g., limits to patient confidentiality, limitations on the provider’s ability to observe the patient, limitations on the diagnostic tools available). I explained to the patient that I may determine at any point that telehealth services are not appropriate based on the patient’s circumstances and either party may therefore end the service to schedule an alternative in-person service or contact 911 to address a medical emergency. With the understanding of these risks, benefits and alternatives, the patient agreed to use the telephone or video-conferencing platform selected for this virtual session and further the patient confirmed his/her understanding that the services do not guarantee a specific outcome or recovery.  \"Spent 20 minutes reviewing chart, documenting, and with patient on phone discussing health concerns.\"    Discussed at visit any disease processes that were of concern as well as the " risks, benefits and instructions on any new medication provided. Patient (and/or caretaker of patient if present) stated all questions were answered, and they voiced understanding of instructions.     Bibiana Silva PA-C          [1]   Current Outpatient Medications:     amitriptyline (Elavil) 10 mg tablet, Take 4 tablets (40 mg) by mouth once daily at bedtime., Disp: 360 tablet, Rfl: 2    cyclobenzaprine (Flexeril) 5 mg tablet, Take 1-2 tablets (5-10 mg) by mouth every 8 hours if needed for muscle spasms., Disp: 90 tablet, Rfl: 2    drospirenone, contraceptive, (Slynd) 4 mg (28) tablet, Take 1 tablet by mouth once daily., Disp: 90 tablet, Rfl: 3    fluocinonide (Lidex) 0.05 % external solution, Use once a day to the affected area on the scalp. Use as needed. Avoid face and groin., Disp: 60 mL, Rfl: 3    fremanezumab (Ajovy Autoinjector) 225 mg/1.5 mL auto-injector, Inject 1 Pen (225 mg) under the skin every 28 (twenty-eight) days., Disp: 1.5 mL, Rfl: 11    hydrocortisone (Anusol-HC) 2.5 % rectal cream, Insert into the rectum 2 times a day for 10 days., Disp: 28 g, Rfl: 1    hydrOXYzine HCL (Atarax) 25 mg tablet, Take 1 tablet (25 mg) by mouth once daily at bedtime., Disp: 90 tablet, Rfl: 3    ketoconazole (NIZOral) 2 % shampoo, Use 2-3x times a week. Leave on for 5 minutes before rinsing off., Disp: 120 mL, Rfl: 11    lactulose 20 gram/30 mL oral solution, Take 30 mL (20 g) by mouth 4 times a day as needed (constipation)., Disp: 473 mL, Rfl: 0    linaCLOtide (Linzess) 290 mcg capsule, Take 1 capsule (290 mcg) by mouth once daily in the morning. Take before meals. Do not crush or chew., Disp: 90 capsule, Rfl: 3    magnesium aspart,citrate,oxide 400 mg magnesium capsule, Take by mouth., Disp: , Rfl:     methen-m.blue[facility]s.[facility] 118-10-40.8-36 mg capsule, Take by mouth., Disp: , Rfl:     nitroglycerin (Rectiv) 0.4 % (w/w) rectal ointment, Insert 1 Application into the rectum every 12 hours., Disp: 30 g, Rfl:  0    omeprazole (PriLOSEC) 40 mg DR capsule, TAKE 1 CAPSULE BY MOUTH ONCE DAILY IN THE MORNING BEFORE MEAL(S), Disp: 90 capsule, Rfl: 0    ondansetron ODT (Zofran-ODT) 4 mg disintegrating tablet, Take 1 tablet (4 mg) by mouth every 6 hours if needed for nausea., Disp: 20 tablet, Rfl: 11    propranolol (Inderal) 40 mg tablet, Take 1 tablet by mouth twice daily, Disp: 60 tablet, Rfl: 0    traZODone (Desyrel) 50 mg tablet, Take 3 tablets (150 mg) by mouth as needed at bedtime for sleep., Disp: , Rfl:     triamcinolone (Kenalog) 0.1 % cream, Apply topically 2 times a day. Apply to affected area 1-2 times daily as needed. Avoid face and groin., Disp: 30 g, Rfl: 0    ubrogepant (Ubrelvy) 100 mg tablet, Take 1 tablet (100 mg) by mouth if needed (Take one tablet at onset of migraine. If still present 2 hrs later, can take a second tablet.)., Disp: 16 tablet, Rfl: 3    vibegron (Gemtesa) 75 mg tablet, Take 1 tablet (75 mg) by mouth once daily., Disp: , Rfl:   [2]   Past Surgical History:  Procedure Laterality Date    COLONOSCOPY      COLPOSCOPY  09/15/24    CYSTOSCOPY      OTHER SURGICAL HISTORY  02/20/2020    No history of surgery    UPPER GASTROINTESTINAL ENDOSCOPY     [3]   Past Medical History:  Diagnosis Date    Acute upper respiratory infection, unspecified 02/09/2018    Adult attention deficit hyperactivity disorder     Anemia     Anxiety     Black tarry stools     Bladder spasms     Bloating     BRBPR (bright red blood per rectum)     Chronic constipation     Depression 2021    Exposure to influenza 12/22/2014    Fibromyalgia, primary     Food intolerance 2022    GERD (gastroesophageal reflux disease)     Hiatal hernia     History of epistaxis 11/27/2019    History of fever 12/22/2014    History of iron deficiency anemia 08/09/2019    History of pharyngitis 12/22/2014    History of sore throat 02/09/2018    Interstitial cystitis     Pediculosis due to pediculus humanus capitis 12/03/2015    Lice infested hair     Pneumonia     Urinary incontinence     Urinary tract infection     Urticaria     Vision loss    [4]   Social History  Tobacco Use    Smoking status: Never    Smokeless tobacco: Never   Vaping Use    Vaping status: Never Used   Substance Use Topics    Alcohol use: Not Currently     Comment: Social    Drug use: Never   [5]   Family History  Problem Relation Name Age of Onset    Other (anxiety and depression) Mother Guadalupe     Hyperlipidemia Mother Guadalupe     Cancer Mother Guadalupe     Depression Mother Guadalupe     Genetic Testing Mother Guadalupe     Hypertension Mother Guadalupe     Mental illness Mother Guadalupe     Asthma Mother Guadalupe     Acne Mother Guadalupe     Depression Sister Sophitia     Mental illness Sister Sophitia     Asthma Sister Sophitia     Acne Sister Sophitia     Alcohol abuse Mother's Sister CherylAnne     Alcohol abuse Mother's Sister Queenie     Vision loss Mother's Sister Queenie     Alcohol abuse Mother's Brother Jaro     Drug abuse Father's Brother Antolin     Alcohol abuse Maternal Grandmother Nina     Hypertension Maternal Grandmother Nina     Mental illness Maternal Grandmother Nina     Stroke Maternal Grandmother Nina     Alcohol abuse Maternal Grandfather Jaro     Heart disease Maternal Grandfather Jaro     Hypertension Maternal Grandfather Jaro     Mental illness Maternal Grandfather Jaro     Stroke Maternal Grandfather Jaro     Colon cancer Maternal Grandfather Jaro 50 - 59

## 2025-08-05 DIAGNOSIS — G43.E11 INTRACTABLE CHRONIC MIGRAINE WITH AURA WITH STATUS MIGRAINOSUS: ICD-10-CM

## 2025-08-06 RX ORDER — PROPRANOLOL HYDROCHLORIDE 40 MG/1
40 TABLET ORAL 2 TIMES DAILY
Qty: 60 TABLET | Refills: 0 | Status: SHIPPED | OUTPATIENT
Start: 2025-08-06

## 2025-08-08 ENCOUNTER — CLINICAL SUPPORT (OUTPATIENT)
Dept: SLEEP MEDICINE | Facility: HOSPITAL | Age: 25
End: 2025-08-08
Payer: MEDICAID

## 2025-08-08 DIAGNOSIS — G47.33 OSA (OBSTRUCTIVE SLEEP APNEA): ICD-10-CM

## 2025-08-08 PROCEDURE — 95806 SLEEP STUDY UNATT&RESP EFFT: CPT | Performed by: INTERNAL MEDICINE

## 2025-08-12 ENCOUNTER — APPOINTMENT (OUTPATIENT)
Dept: GASTROENTEROLOGY | Facility: CLINIC | Age: 25
End: 2025-08-12
Payer: MEDICAID

## 2025-08-12 VITALS
RESPIRATION RATE: 17 BRPM | HEIGHT: 67 IN | BODY MASS INDEX: 27.47 KG/M2 | DIASTOLIC BLOOD PRESSURE: 77 MMHG | HEART RATE: 114 BPM | OXYGEN SATURATION: 100 % | TEMPERATURE: 96.8 F | SYSTOLIC BLOOD PRESSURE: 110 MMHG | WEIGHT: 175 LBS

## 2025-08-12 DIAGNOSIS — K58.1 IRRITABLE BOWEL SYNDROME WITH CONSTIPATION: Primary | ICD-10-CM

## 2025-08-12 DIAGNOSIS — M79.89 LEG SWELLING: ICD-10-CM

## 2025-08-12 DIAGNOSIS — K60.2 ANAL FISSURE: ICD-10-CM

## 2025-08-12 DIAGNOSIS — G90.A POTS (POSTURAL ORTHOSTATIC TACHYCARDIA SYNDROME): ICD-10-CM

## 2025-08-12 PROCEDURE — 99214 OFFICE O/P EST MOD 30 MIN: CPT

## 2025-08-12 PROCEDURE — 1036F TOBACCO NON-USER: CPT

## 2025-08-12 PROCEDURE — 3008F BODY MASS INDEX DOCD: CPT

## 2025-08-12 RX ORDER — TAMSULOSIN HYDROCHLORIDE 0.4 MG/1
0.8 CAPSULE ORAL NIGHTLY
COMMUNITY
Start: 2025-06-27

## 2025-08-12 RX ORDER — METHYLPHENIDATE HYDROCHLORIDE 18 MG/1
18 TABLET ORAL EVERY MORNING
COMMUNITY
Start: 2025-07-28

## 2025-08-12 RX ORDER — NITROGLYCERIN 4 MG/G
1 OINTMENT RECTAL EVERY 12 HOURS SCHEDULED
Qty: 30 G | Refills: 0 | Status: SHIPPED | OUTPATIENT
Start: 2025-08-12 | End: 2025-09-11

## 2025-08-12 RX ORDER — PSYLLIUM SEED
PACKET (EA) ORAL
Qty: 660 G | Refills: 3 | Status: SHIPPED | OUTPATIENT
Start: 2025-08-12

## 2025-08-12 RX ORDER — POLYETHYLENE GLYCOL-3350, SODIUM CHLORIDE, POTASSIUM CHLORIDE AND SODIUM BICARBONATE 420; 11.2; 5.72; 1.48 G/438.4G; G/438.4G; G/438.4G; G/438.4G
4000 POWDER, FOR SOLUTION ORAL ONCE
COMMUNITY
Start: 2025-05-19

## 2025-08-12 ASSESSMENT — PAIN SCALES - GENERAL: PAINLEVEL_OUTOF10: 5

## 2025-08-12 ASSESSMENT — ENCOUNTER SYMPTOMS
UNEXPECTED WEIGHT CHANGE: 1
ROS GI COMMENTS: SEE HPI

## 2025-08-12 ASSESSMENT — COLUMBIA-SUICIDE SEVERITY RATING SCALE - C-SSRS
2. HAVE YOU ACTUALLY HAD ANY THOUGHTS OF KILLING YOURSELF?: NO
6. HAVE YOU EVER DONE ANYTHING, STARTED TO DO ANYTHING, OR PREPARED TO DO ANYTHING TO END YOUR LIFE?: NO
1. IN THE PAST MONTH, HAVE YOU WISHED YOU WERE DEAD OR WISHED YOU COULD GO TO SLEEP AND NOT WAKE UP?: NO

## 2025-08-14 ENCOUNTER — OFFICE VISIT (OUTPATIENT)
Dept: PRIMARY CARE | Facility: CLINIC | Age: 25
End: 2025-08-14
Payer: MEDICAID

## 2025-08-14 VITALS
SYSTOLIC BLOOD PRESSURE: 122 MMHG | DIASTOLIC BLOOD PRESSURE: 80 MMHG | HEART RATE: 88 BPM | TEMPERATURE: 98.2 F | OXYGEN SATURATION: 99 % | WEIGHT: 173 LBS | BODY MASS INDEX: 27.1 KG/M2

## 2025-08-14 DIAGNOSIS — J02.9 PHARYNGITIS, UNSPECIFIED ETIOLOGY: Primary | ICD-10-CM

## 2025-08-14 DIAGNOSIS — G89.29 OTHER CHRONIC PAIN: ICD-10-CM

## 2025-08-14 DIAGNOSIS — M54.50 ACUTE LOW BACK PAIN WITHOUT SCIATICA, UNSPECIFIED BACK PAIN LATERALITY: ICD-10-CM

## 2025-08-14 LAB — POC RAPID STREP: NEGATIVE

## 2025-08-14 RX ORDER — METHYLPREDNISOLONE 4 MG/1
TABLET ORAL
Qty: 21 TABLET | Refills: 0 | Status: SHIPPED | OUTPATIENT
Start: 2025-08-14 | End: 2025-08-20

## 2025-08-18 DIAGNOSIS — J02.9 PHARYNGITIS, UNSPECIFIED ETIOLOGY: Primary | ICD-10-CM

## 2025-08-18 DIAGNOSIS — J06.9 UPPER RESPIRATORY TRACT INFECTION, UNSPECIFIED TYPE: ICD-10-CM

## 2025-08-18 RX ORDER — AMOXICILLIN AND CLAVULANATE POTASSIUM 875; 125 MG/1; MG/1
875 TABLET, FILM COATED ORAL 2 TIMES DAILY
Qty: 20 TABLET | Refills: 0 | Status: SHIPPED | OUTPATIENT
Start: 2025-08-18 | End: 2025-08-28

## 2025-09-05 ENCOUNTER — APPOINTMENT (OUTPATIENT)
Dept: DERMATOLOGY | Facility: CLINIC | Age: 25
End: 2025-09-05
Payer: MEDICAID

## 2025-09-05 DIAGNOSIS — G43.E11 INTRACTABLE CHRONIC MIGRAINE WITH AURA WITH STATUS MIGRAINOSUS: ICD-10-CM

## 2025-09-05 RX ORDER — PROPRANOLOL HYDROCHLORIDE 40 MG/1
40 TABLET ORAL 2 TIMES DAILY
Qty: 60 TABLET | Refills: 0 | Status: SHIPPED | OUTPATIENT
Start: 2025-09-05

## 2025-09-25 ENCOUNTER — APPOINTMENT (OUTPATIENT)
Dept: UROLOGY | Facility: CLINIC | Age: 25
End: 2025-09-25
Payer: MEDICAID

## 2025-12-12 ENCOUNTER — APPOINTMENT (OUTPATIENT)
Dept: GASTROENTEROLOGY | Facility: CLINIC | Age: 25
End: 2025-12-12
Payer: MEDICAID

## 2026-03-18 ENCOUNTER — APPOINTMENT (OUTPATIENT)
Dept: CARDIOLOGY | Facility: CLINIC | Age: 26
End: 2026-03-18
Payer: MEDICAID

## (undated) DEVICE — SYRINGE, 10 CC, LUER LOCK

## (undated) DEVICE — COLLECTION BAG, FLUID, NON-STERILE

## (undated) DEVICE — DRAPE, UNDERBUTTOCKS

## (undated) DEVICE — TRAY, PARACERVICAL BLOCK, CUSTOM

## (undated) DEVICE — SLEEVE, VASO PRESS, CALF GARMENT, MEDIUM, GREEN

## (undated) DEVICE — Device

## (undated) DEVICE — IRRIGATION SET, CYSTOSCOPY, TURP, Y, CONTINUOUS, 81 IN